# Patient Record
Sex: FEMALE | Race: ASIAN | HISPANIC OR LATINO | Employment: OTHER
[De-identification: names, ages, dates, MRNs, and addresses within clinical notes are randomized per-mention and may not be internally consistent; named-entity substitution may affect disease eponyms.]

---

## 2019-09-10 ENCOUNTER — RECORDS - HEALTHEAST (OUTPATIENT)
Dept: ADMINISTRATIVE | Facility: OTHER | Age: 34
End: 2019-09-10

## 2019-11-06 ENCOUNTER — OFFICE VISIT - HEALTHEAST (OUTPATIENT)
Dept: FAMILY MEDICINE | Facility: CLINIC | Age: 34
End: 2019-11-06

## 2019-11-06 DIAGNOSIS — A04.8 H. PYLORI INFECTION: ICD-10-CM

## 2019-11-06 DIAGNOSIS — R09.82 POSTNASAL DISCHARGE: ICD-10-CM

## 2019-11-06 DIAGNOSIS — Z76.89 ENCOUNTER TO ESTABLISH CARE: ICD-10-CM

## 2019-11-06 ASSESSMENT — MIFFLIN-ST. JEOR: SCORE: 1104.33

## 2019-12-20 ENCOUNTER — OFFICE VISIT - HEALTHEAST (OUTPATIENT)
Dept: FAMILY MEDICINE | Facility: CLINIC | Age: 34
End: 2019-12-20

## 2019-12-20 ENCOUNTER — COMMUNICATION - HEALTHEAST (OUTPATIENT)
Dept: FAMILY MEDICINE | Facility: CLINIC | Age: 34
End: 2019-12-20

## 2020-01-02 ENCOUNTER — OFFICE VISIT - HEALTHEAST (OUTPATIENT)
Dept: FAMILY MEDICINE | Facility: CLINIC | Age: 35
End: 2020-01-02

## 2020-01-02 DIAGNOSIS — R05.9 COUGH: ICD-10-CM

## 2020-01-02 DIAGNOSIS — K29.00 OTHER ACUTE GASTRITIS, PRESENCE OF BLEEDING UNSPECIFIED: ICD-10-CM

## 2020-01-02 ASSESSMENT — MIFFLIN-ST. JEOR: SCORE: 1119.48

## 2020-01-06 LAB — H PYLORI AG STL QL IA: NEGATIVE

## 2020-01-08 ENCOUNTER — COMMUNICATION - HEALTHEAST (OUTPATIENT)
Dept: FAMILY MEDICINE | Facility: CLINIC | Age: 35
End: 2020-01-08

## 2020-01-08 DIAGNOSIS — K29.00 OTHER ACUTE GASTRITIS, PRESENCE OF BLEEDING UNSPECIFIED: ICD-10-CM

## 2020-01-17 ENCOUNTER — COMMUNICATION - HEALTHEAST (OUTPATIENT)
Dept: SCHEDULING | Facility: CLINIC | Age: 35
End: 2020-01-17

## 2020-01-17 ENCOUNTER — COMMUNICATION - HEALTHEAST (OUTPATIENT)
Dept: FAMILY MEDICINE | Facility: CLINIC | Age: 35
End: 2020-01-17

## 2020-01-20 ENCOUNTER — OFFICE VISIT - HEALTHEAST (OUTPATIENT)
Dept: FAMILY MEDICINE | Facility: CLINIC | Age: 35
End: 2020-01-20

## 2020-01-30 ENCOUNTER — OFFICE VISIT - HEALTHEAST (OUTPATIENT)
Dept: FAMILY MEDICINE | Facility: CLINIC | Age: 35
End: 2020-01-30

## 2020-01-30 DIAGNOSIS — K29.00 OTHER ACUTE GASTRITIS, PRESENCE OF BLEEDING UNSPECIFIED: ICD-10-CM

## 2020-01-30 DIAGNOSIS — R05.9 COUGH: ICD-10-CM

## 2020-01-30 ASSESSMENT — MIFFLIN-ST. JEOR: SCORE: 1109.33

## 2020-02-27 ENCOUNTER — OFFICE VISIT - HEALTHEAST (OUTPATIENT)
Dept: FAMILY MEDICINE | Facility: CLINIC | Age: 35
End: 2020-02-27

## 2020-02-27 DIAGNOSIS — R05.9 COUGH: ICD-10-CM

## 2020-02-27 DIAGNOSIS — K29.00 OTHER ACUTE GASTRITIS, PRESENCE OF BLEEDING UNSPECIFIED: ICD-10-CM

## 2020-02-27 DIAGNOSIS — Z32.01 PREGNANCY TEST POSITIVE: ICD-10-CM

## 2020-02-27 DIAGNOSIS — R30.0 DYSURIA: ICD-10-CM

## 2020-02-27 DIAGNOSIS — N92.6 MISSED PERIOD: ICD-10-CM

## 2020-02-27 LAB
ALBUMIN UR-MCNC: NEGATIVE MG/DL
APPEARANCE UR: CLEAR
BILIRUB UR QL STRIP: NEGATIVE
COLOR UR AUTO: NORMAL
GLUCOSE UR STRIP-MCNC: NEGATIVE MG/DL
HCG UR QL: POSITIVE
HGB UR QL STRIP: NEGATIVE
KETONES UR STRIP-MCNC: NEGATIVE MG/DL
LEUKOCYTE ESTERASE UR QL STRIP: NEGATIVE
NITRATE UR QL: NEGATIVE
PH UR STRIP: 7.5 [PH] (ref 5–8)
SP GR UR STRIP: 1.01 (ref 1–1.03)
UROBILINOGEN UR STRIP-ACNC: NORMAL

## 2020-02-27 RX ORDER — PRENATAL VIT/IRON FUM/FOLIC AC 27MG-0.8MG
1 TABLET ORAL DAILY
Qty: 90 TABLET | Refills: 3 | Status: SHIPPED | OUTPATIENT
Start: 2020-02-27 | End: 2021-08-12

## 2020-02-27 ASSESSMENT — MIFFLIN-ST. JEOR: SCORE: 1094.57

## 2020-03-09 ENCOUNTER — COMMUNICATION - HEALTHEAST (OUTPATIENT)
Dept: FAMILY MEDICINE | Facility: CLINIC | Age: 35
End: 2020-03-09

## 2020-03-09 ENCOUNTER — OFFICE VISIT - HEALTHEAST (OUTPATIENT)
Dept: FAMILY MEDICINE | Facility: CLINIC | Age: 35
End: 2020-03-09

## 2020-03-09 ENCOUNTER — COMMUNICATION - HEALTHEAST (OUTPATIENT)
Dept: SCHEDULING | Facility: CLINIC | Age: 35
End: 2020-03-09

## 2020-03-09 DIAGNOSIS — O03.9 MISCARRIAGE: ICD-10-CM

## 2020-03-09 DIAGNOSIS — G44.209 TENSION HEADACHE: ICD-10-CM

## 2020-03-09 DIAGNOSIS — Z23 IMMUNIZATION DUE: ICD-10-CM

## 2020-03-09 LAB — HCG SERPL-ACNC: 877 MLU/ML (ref 0–4)

## 2020-03-09 ASSESSMENT — MIFFLIN-ST. JEOR: SCORE: 1113.68

## 2020-03-10 ENCOUNTER — AMBULATORY - HEALTHEAST (OUTPATIENT)
Dept: FAMILY MEDICINE | Facility: CLINIC | Age: 35
End: 2020-03-10

## 2020-03-10 DIAGNOSIS — O03.9 MISCARRIAGE: ICD-10-CM

## 2020-03-16 ENCOUNTER — AMBULATORY - HEALTHEAST (OUTPATIENT)
Dept: LAB | Facility: CLINIC | Age: 35
End: 2020-03-16

## 2020-03-16 DIAGNOSIS — O03.9 MISCARRIAGE: ICD-10-CM

## 2020-03-16 LAB — HCG SERPL-ACNC: 49 MLU/ML (ref 0–4)

## 2020-03-17 ENCOUNTER — AMBULATORY - HEALTHEAST (OUTPATIENT)
Dept: LAB | Facility: CLINIC | Age: 35
End: 2020-03-17

## 2020-03-17 ENCOUNTER — AMBULATORY - HEALTHEAST (OUTPATIENT)
Dept: FAMILY MEDICINE | Facility: CLINIC | Age: 35
End: 2020-03-17

## 2020-03-17 DIAGNOSIS — O03.9 MISCARRIAGE: ICD-10-CM

## 2020-03-17 LAB — HCG SERPL-ACNC: 32 MLU/ML (ref 0–4)

## 2020-08-06 ENCOUNTER — OFFICE VISIT - HEALTHEAST (OUTPATIENT)
Dept: FAMILY MEDICINE | Facility: CLINIC | Age: 35
End: 2020-08-06

## 2020-08-06 DIAGNOSIS — R09.82 POSTNASAL DISCHARGE: ICD-10-CM

## 2020-08-06 DIAGNOSIS — K29.00 OTHER ACUTE GASTRITIS, PRESENCE OF BLEEDING UNSPECIFIED: ICD-10-CM

## 2020-08-06 DIAGNOSIS — R05.9 COUGH: ICD-10-CM

## 2020-08-06 RX ORDER — CETIRIZINE HYDROCHLORIDE 10 MG/1
10 TABLET ORAL DAILY
Qty: 30 TABLET | Refills: 2 | Status: SHIPPED | OUTPATIENT
Start: 2020-08-06 | End: 2021-08-12

## 2020-08-19 ENCOUNTER — OFFICE VISIT - HEALTHEAST (OUTPATIENT)
Dept: FAMILY MEDICINE | Facility: CLINIC | Age: 35
End: 2020-08-19

## 2020-08-19 DIAGNOSIS — Z28.39 IMMUNIZATION DEFICIENCY: ICD-10-CM

## 2020-08-19 ASSESSMENT — MIFFLIN-ST. JEOR: SCORE: 1134.95

## 2020-09-08 ENCOUNTER — COMMUNICATION - HEALTHEAST (OUTPATIENT)
Dept: FAMILY MEDICINE | Facility: CLINIC | Age: 35
End: 2020-09-08

## 2020-09-11 ENCOUNTER — OFFICE VISIT - HEALTHEAST (OUTPATIENT)
Dept: FAMILY MEDICINE | Facility: CLINIC | Age: 35
End: 2020-09-11

## 2020-09-11 ENCOUNTER — COMMUNICATION - HEALTHEAST (OUTPATIENT)
Dept: FAMILY MEDICINE | Facility: CLINIC | Age: 35
End: 2020-09-11

## 2020-09-11 DIAGNOSIS — Z23 NEED FOR IMMUNIZATION AGAINST INFLUENZA: ICD-10-CM

## 2020-09-11 DIAGNOSIS — K21.9 GASTROESOPHAGEAL REFLUX DISEASE WITHOUT ESOPHAGITIS: ICD-10-CM

## 2020-09-11 DIAGNOSIS — R30.0 DYSURIA: ICD-10-CM

## 2020-09-11 LAB
ALBUMIN UR-MCNC: NEGATIVE MG/DL
APPEARANCE UR: CLEAR
BILIRUB UR QL STRIP: NEGATIVE
COLOR UR AUTO: YELLOW
GLUCOSE UR STRIP-MCNC: NEGATIVE MG/DL
HGB UR QL STRIP: NEGATIVE
KETONES UR STRIP-MCNC: NEGATIVE MG/DL
LEUKOCYTE ESTERASE UR QL STRIP: NEGATIVE
NITRATE UR QL: NEGATIVE
PH UR STRIP: 7 [PH] (ref 5–8)
SP GR UR STRIP: 1.02 (ref 1–1.03)
UROBILINOGEN UR STRIP-ACNC: NORMAL

## 2020-09-11 ASSESSMENT — MIFFLIN-ST. JEOR: SCORE: 1113.4

## 2020-10-11 ENCOUNTER — COMMUNICATION - HEALTHEAST (OUTPATIENT)
Dept: FAMILY MEDICINE | Facility: CLINIC | Age: 35
End: 2020-10-11

## 2020-10-11 DIAGNOSIS — K29.00 OTHER ACUTE GASTRITIS, PRESENCE OF BLEEDING UNSPECIFIED: ICD-10-CM

## 2020-10-13 RX ORDER — FAMOTIDINE 20 MG/1
TABLET, FILM COATED ORAL
Qty: 180 TABLET | Refills: 3 | Status: SHIPPED | OUTPATIENT
Start: 2020-10-13 | End: 2021-08-12

## 2021-03-31 ENCOUNTER — OFFICE VISIT - HEALTHEAST (OUTPATIENT)
Dept: FAMILY MEDICINE | Facility: CLINIC | Age: 36
End: 2021-03-31

## 2021-03-31 DIAGNOSIS — R05.9 COUGH: ICD-10-CM

## 2021-03-31 DIAGNOSIS — R30.0 DYSURIA: ICD-10-CM

## 2021-03-31 DIAGNOSIS — K21.9 GASTROESOPHAGEAL REFLUX DISEASE WITHOUT ESOPHAGITIS: ICD-10-CM

## 2021-03-31 LAB
ALBUMIN UR-MCNC: NEGATIVE G/DL
APPEARANCE UR: CLEAR
BILIRUB UR QL STRIP: NEGATIVE
COLOR UR AUTO: YELLOW
GLUCOSE UR STRIP-MCNC: NEGATIVE MG/DL
HGB UR QL STRIP: NEGATIVE
KETONES UR STRIP-MCNC: NEGATIVE MG/DL
LEUKOCYTE ESTERASE UR QL STRIP: NEGATIVE
NITRATE UR QL: NEGATIVE
PH UR STRIP: 5.5 [PH] (ref 5–8)
SP GR UR STRIP: 1.02 (ref 1–1.03)
UROBILINOGEN UR STRIP-ACNC: NORMAL

## 2021-03-31 RX ORDER — MONTELUKAST SODIUM 10 MG/1
10 TABLET ORAL DAILY
Qty: 30 TABLET | Refills: 2 | Status: SHIPPED | OUTPATIENT
Start: 2021-03-31 | End: 2021-08-12

## 2021-03-31 ASSESSMENT — MIFFLIN-ST. JEOR: SCORE: 1173.81

## 2021-04-28 ENCOUNTER — OFFICE VISIT - HEALTHEAST (OUTPATIENT)
Dept: FAMILY MEDICINE | Facility: CLINIC | Age: 36
End: 2021-04-28

## 2021-04-28 ENCOUNTER — COMMUNICATION - HEALTHEAST (OUTPATIENT)
Dept: ADMINISTRATIVE | Facility: CLINIC | Age: 36
End: 2021-04-28

## 2021-04-28 DIAGNOSIS — H91.93 BILATERAL HEARING LOSS, UNSPECIFIED HEARING LOSS TYPE: ICD-10-CM

## 2021-04-28 DIAGNOSIS — H61.119: ICD-10-CM

## 2021-04-28 DIAGNOSIS — G44.209 TENSION-TYPE HEADACHE, NOT INTRACTABLE, UNSPECIFIED CHRONICITY PATTERN: ICD-10-CM

## 2021-04-28 DIAGNOSIS — H61.111 EAR CARTILAGE DEFORMITY, RIGHT: ICD-10-CM

## 2021-04-28 DIAGNOSIS — R10.2 PELVIC PAIN IN FEMALE: ICD-10-CM

## 2021-04-28 LAB
ALBUMIN UR-MCNC: NEGATIVE G/DL
APPEARANCE UR: CLEAR
BILIRUB UR QL STRIP: NEGATIVE
COLOR UR AUTO: YELLOW
GLUCOSE UR STRIP-MCNC: NEGATIVE MG/DL
HCG UR QL: NEGATIVE
HGB UR QL STRIP: NEGATIVE
KETONES UR STRIP-MCNC: NEGATIVE MG/DL
LEUKOCYTE ESTERASE UR QL STRIP: NEGATIVE
NITRATE UR QL: NEGATIVE
PH UR STRIP: 5.5 [PH] (ref 5–8)
SP GR UR STRIP: 1.01 (ref 1–1.03)
UROBILINOGEN UR STRIP-ACNC: NORMAL

## 2021-04-28 RX ORDER — IBUPROFEN 200 MG
400 TABLET ORAL EVERY 6 HOURS PRN
Qty: 60 TABLET | Refills: 12 | Status: SHIPPED | OUTPATIENT
Start: 2021-04-28 | End: 2021-08-12

## 2021-04-28 ASSESSMENT — MIFFLIN-ST. JEOR: SCORE: 1172.16

## 2021-05-27 ENCOUNTER — COMMUNICATION - HEALTHEAST (OUTPATIENT)
Dept: FAMILY MEDICINE | Facility: CLINIC | Age: 36
End: 2021-05-27

## 2021-06-03 VITALS
BODY MASS INDEX: 23.01 KG/M2 | RESPIRATION RATE: 18 BRPM | SYSTOLIC BLOOD PRESSURE: 112 MMHG | HEART RATE: 97 BPM | HEIGHT: 59 IN | TEMPERATURE: 97.6 F | DIASTOLIC BLOOD PRESSURE: 82 MMHG | OXYGEN SATURATION: 99 % | WEIGHT: 114.13 LBS

## 2021-06-03 NOTE — PROGRESS NOTES
ASSESSMENT AND PLAN:  1. Encounter to establish care  Patient lives in [facility] before moving to [place].  There she was seen at a clinic and I requested records.  She is apparently treated for H. pylori there.  She was also treated for a cough and possible allergies.  She does not have any of those records    2. H. pylori infection    We will bottles of clarithromycin and amoxicillin brought in today patient still not eating spicy food denies any abdominal burning at this time    3. Postnasal discharge    Patient's had significant cough for a week had previous symptoms before [place] respiratory exams unremarkable cetirizine started follow-up in 4 to 6 weeks  - cetirizine (ZYRTEC) 10 MG tablet; Take 1 tablet (10 mg total) by mouth daily.  Dispense: 30 tablet; Refill: 2            No orders of the defined types were placed in this encounter.    There are no discontinued medications.    No follow-ups on file.    CHIEF COMPLAINT:  Chief Complaint   Patient presents with     Establish Care     medication refill       HISTORY OF PRESENT ILLNESS:  Rafal is a 34 y.o. female who presents to the clinic today for establishment of care and a cough. Rafal is present with a Zainab . Today rafal is present with ah cough that has been constant for two weeks. The cough keeps her up at night as she has to change positions for comfort.  Say confirms no one else in the home is sick .     Rafal recently moved from [place], [place]. There she was treated by a Dr. Roselia Hernandez for H.Pylori. She was prescribed amoxicillin, and completed the course. Since Say confirms she has been able to eat spicy foods but is still careful. Denies stomach irritation at this time .    REVIEW OF SYSTEMS:   Admits to scratchy throat, rhinorrhea, head ache and coughing.   Denies snoring, abdominal pain, and stomach irritation.   All other 12 point review systems are negative.    PFSH:  Daren moved from [place], [place] in September. She has  Not lives anywhere  "else in the [place]    TOBACCO USE:  Social History     Tobacco Use   Smoking Status Never Smoker   Smokeless Tobacco Current User   Tobacco Comment    Chews betel nut with tobacco       VITALS:  Vitals:    11/06/19 1555   BP: 112/82   Pulse: 97   Resp: 18   Temp: 97.6  F (36.4  C)   TempSrc: Oral   SpO2: 99%   Weight: 114 lb 2 oz (51.8 kg)   Height: 4' 10.75\" (1.492 m)     Wt Readings from Last 3 Encounters:   11/06/19 114 lb 2 oz (51.8 kg)     Body mass index is 23.25 kg/m .    PHYSICAL EXAM:  General: Alert, cooperative, no distress, appears stated age  Head: Normocephalic, without obvious abnormality, atraumatic  Eyes: PERRL, conjunctiva/cornea clear, EOM's intact, fundi benign, both eyes  Ears: Normal TM's and external ear canals, both ears. Microtia right Morgan  Nose: Nares normal, septum midline, mucosa normal, no drainage or sinus tenderness  Throat: Lips, mucosa, and tongue normal; teeth and gums normal. Tonsils removed.   Lungs: Clear to auscultation bilaterally, respirations unlabored  Chest wall: No tenderness or deformity  Heart: Regular rate and rhythm, S1 and S2 normal, no murmur, rub, or gallop  Abdomen: Soft, non tender, bowel sounds active all four quadrants, no masses, no organomegaly.  Neurologic:  A & O x 3.  No tremor, no focal findings.      DATA REVIEWED:  Additional History from Old Records Summarized (2): None  Decision to Obtain Records (1): Release of information obtained for clinic in [facility]  Radiology Tests Summarized or Ordered (1): None  Labs Reviewed or Ordered (1): None  Medicine Test Summarized or Ordered (1): None  Independent Review of EKG or X-RAY(2 each): None    The visit lasted a total of 30 minutes face to face with the patient. Over 50% of the time was spent counseling and educating the patient about cough.  H. pylori.    Mirna JACOBO, am scribing for and in the presence of, Dr. Owens.    IDr. Owens, personally performed the services described in this " documentation, as scribed by Mirna Zuniga in my presence, and it is both accurate and complete.      MEDICATIONS:  Current Outpatient Medications   Medication Sig Dispense Refill     cetirizine (ZYRTEC) 10 MG tablet Take 1 tablet (10 mg total) by mouth daily. 30 tablet 2     No current facility-administered medications for this visit.        Total Data Points: 0    Please note that this clinical encounter uses voice recognition software, there may be typographical errors present

## 2021-06-04 VITALS
TEMPERATURE: 97.5 F | OXYGEN SATURATION: 96 % | HEART RATE: 111 BPM | BODY MASS INDEX: 23.07 KG/M2 | RESPIRATION RATE: 20 BRPM | DIASTOLIC BLOOD PRESSURE: 70 MMHG | WEIGHT: 114.44 LBS | SYSTOLIC BLOOD PRESSURE: 110 MMHG | HEIGHT: 59 IN

## 2021-06-04 VITALS
TEMPERATURE: 97.9 F | OXYGEN SATURATION: 98 % | HEIGHT: 59 IN | RESPIRATION RATE: 18 BRPM | SYSTOLIC BLOOD PRESSURE: 100 MMHG | BODY MASS INDEX: 24.19 KG/M2 | WEIGHT: 120 LBS | DIASTOLIC BLOOD PRESSURE: 64 MMHG | HEART RATE: 86 BPM

## 2021-06-04 VITALS
HEART RATE: 111 BPM | SYSTOLIC BLOOD PRESSURE: 102 MMHG | WEIGHT: 117.5 LBS | DIASTOLIC BLOOD PRESSURE: 54 MMHG | BODY MASS INDEX: 23.69 KG/M2 | OXYGEN SATURATION: 98 % | HEIGHT: 59 IN | TEMPERATURE: 97.7 F | RESPIRATION RATE: 22 BRPM

## 2021-06-04 VITALS
DIASTOLIC BLOOD PRESSURE: 68 MMHG | OXYGEN SATURATION: 99 % | HEIGHT: 58 IN | BODY MASS INDEX: 24.05 KG/M2 | HEART RATE: 89 BPM | RESPIRATION RATE: 18 BRPM | WEIGHT: 114.6 LBS | TEMPERATURE: 98.2 F | SYSTOLIC BLOOD PRESSURE: 99 MMHG

## 2021-06-04 VITALS
HEIGHT: 59 IN | HEART RATE: 102 BPM | SYSTOLIC BLOOD PRESSURE: 102 MMHG | TEMPERATURE: 97.9 F | DIASTOLIC BLOOD PRESSURE: 60 MMHG | BODY MASS INDEX: 23.48 KG/M2 | OXYGEN SATURATION: 97 % | RESPIRATION RATE: 16 BRPM | WEIGHT: 116.5 LBS

## 2021-06-04 NOTE — PROGRESS NOTES
ASSESSMENT AND PLAN:  1. Cough  She had no response to cetirizine I am stopping the medic Singulair she has a dry cough that is always present  - montelukast (SINGULAIR) 10 mg tablet; Take 1 tablet (10 mg total) by mouth daily.  Dispense: 30 tablet; Refill: 2    2. Other acute gastritis, presence of bleeding unspecified  H. pylori test positive before [place] she is having burning in the abdominal area she can eat spicy food I am repeating the H. pylori test will start on ranitidine  - H. pylori Antigen, Stool(HPSAG)  - ranitidine (ZANTAC) 150 MG tablet; Take 1 tablet (150 mg total) by mouth 2 (two) times a day.  Dispense: 60 tablet; Refill: 1          Orders Placed This Encounter   Procedures     H. pylori Antigen, Stool(HPSAG)     There are no discontinued medications.    Return in about 4 weeks (around 1/30/2020) for Recheck.    CHIEF COMPLAINT:  Chief Complaint   Patient presents with     Allergies       HISTORY OF PRESENT ILLNESS:  Oli is a 35 y.o. female with history of H. pylori infection who presents to the clinic today for follow up on allergies. Oli is present with a Zainab . I had started her on cetirizine back in November 2019. She is having a dry cough at this time. Her cough is worse at night. It only improve minimally while on cetirizine. The patient is now off of cetirizine though is unsure when exactly she ran out. She did chew betel nut today.     She notes recent onset of burning abdominal pain.     REVIEW OF SYSTEMS:   Respiratory: Dry cough.   GI: Burning abdominal pain.   All other 10 point review of systems are negative.    PFSH:  Patient moved from [place], [place] in September 2019. She chews betel nut. Reviewed as below.     TOBACCO USE:  Social History     Tobacco Use   Smoking Status Never Smoker   Smokeless Tobacco Current User   Tobacco Comment    Chews betel nut with tobacco       VITALS:  Vitals:    01/02/20 1007   BP: 102/54   Patient Site: Right Arm   Patient Position: Sitting  "  Cuff Size: Adult Regular   Pulse: (!) 111   Resp: 22   Temp: 97.7  F (36.5  C)   TempSrc: Oral   SpO2: 98%   Weight: 117 lb 8 oz (53.3 kg)   Height: 4' 10.74\" (1.492 m)     Wt Readings from Last 3 Encounters:   01/02/20 117 lb 8 oz (53.3 kg)   11/06/19 114 lb 2 oz (51.8 kg)     Body mass index is 23.94 kg/m .    PHYSICAL EXAM:  General: Alert, cooperative, no distress, appears stated age  Head: Normocephalic, without obvious abnormality, atraumatic  Eyes: PERRL, conjunctiva/cornea clear, EOM's intact  Ears: Normal TM's and external ear canals, both ears  Nose: Nares normal, septum midline, mucosa normal, no drainage or sinus tenderness  Throat: Stained with betel nuts; lips, mucosa, and tongue otherwise normal; teeth and gums otherwise normal; posterior of pharyngeal wall is nonerythematous   Neck: Supple, no cervical lymph node enlargement   Lungs: Clear to auscultation bilaterally, respirations unlabored  Heart: Regular rate and rhythm, S1 and S2 normal, no murmur, rub, or gallop  Abdomen: Soft, non tender, bowel sounds active all four quadrants, no masses, no organomegaly.  Neurologic:  A & O x 3.  No tremor, no focal findings.  Normal gait.   Psychiatric: Normal affect, good eye contact, well-groomed  Skin: No rash or suspicious lesions noted on exposed skin, non-diaphoretic    DATA REVIEWED:  Additional History from Old Records Summarized (2): none  Decision to Obtain Records (1): none  Radiology Tests Summarized or Ordered (1): none  Labs Reviewed or Ordered (1): Labs ordered.  Medicine Test Summarized or Ordered (1): none  Independent Review of EKG or X-RAY(2 each): none    I, Cristina Su, am scribing for and in the presence of, Dr. Owens.    I, Dr. Owens, personally performed the services described in this documentation, as scribed by Crisitna Su in my presence, and it is both accurate and complete.    Total amount time spent the patient today 25 minutes given 50% of the time was spent discussing her " gastritis and cough  MEDICATIONS:  Current Outpatient Medications   Medication Sig Dispense Refill     cetirizine (ZYRTEC) 10 MG tablet Take 1 tablet (10 mg total) by mouth daily. 30 tablet 2     montelukast (SINGULAIR) 10 mg tablet Take 1 tablet (10 mg total) by mouth daily. 30 tablet 2     ranitidine (ZANTAC) 150 MG tablet Take 1 tablet (150 mg total) by mouth 2 (two) times a day. 60 tablet 1     No current facility-administered medications for this visit.        Total Data Points: 1    Please note that this clinical encounter uses voice recognition software, there may be typographical errors present

## 2021-06-04 NOTE — PATIENT INSTRUCTIONS - HE
Cough:     Start montelukast (SINGULAIR) 10 mg tablet; Take 1 tablet (10 mg total) by mouth daily.     Gastritis:     H. pylori screen ordered.    If positive, prescriptions will be sent for treatment course.    In the meantime, start ranitidine (ZANTAC) 150 MG tablet; Take 1 tablet (150 mg total) by mouth 2 (two) times a day.    Follow up:     Return to see Dr. Owens in 4 weeks.

## 2021-06-04 NOTE — TELEPHONE ENCOUNTER
Upcoming Appointment Question  When is the appointment: 01/02/20  What is your appointment for?: allergies - this is a reschedule of a missed 12/20/19 appointment  Who is your appointment scheduled with?: Dr. Owens  What is your question/concern?: patient's sister called to reschedule missed appointment on 12/20/19.  Sister states the patient did not have transportation today.  Once sister rescheduled the appointment she then requested transportation from the clinic for the appointment.    Okay to leave a detailed message?: Yes please contact patient at [phone]

## 2021-06-05 VITALS
HEART RATE: 90 BPM | WEIGHT: 117 LBS | TEMPERATURE: 97.7 F | OXYGEN SATURATION: 98 % | DIASTOLIC BLOOD PRESSURE: 60 MMHG | BODY MASS INDEX: 23.59 KG/M2 | HEIGHT: 59 IN | SYSTOLIC BLOOD PRESSURE: 92 MMHG

## 2021-06-05 VITALS
HEIGHT: 59 IN | RESPIRATION RATE: 14 BRPM | TEMPERATURE: 98.7 F | OXYGEN SATURATION: 98 % | HEART RATE: 95 BPM | WEIGHT: 128.1 LBS | BODY MASS INDEX: 25.83 KG/M2 | SYSTOLIC BLOOD PRESSURE: 100 MMHG | DIASTOLIC BLOOD PRESSURE: 70 MMHG

## 2021-06-05 VITALS
RESPIRATION RATE: 16 BRPM | HEART RATE: 91 BPM | OXYGEN SATURATION: 99 % | SYSTOLIC BLOOD PRESSURE: 100 MMHG | DIASTOLIC BLOOD PRESSURE: 64 MMHG | TEMPERATURE: 97.5 F | WEIGHT: 127.75 LBS | HEIGHT: 59 IN | BODY MASS INDEX: 25.76 KG/M2

## 2021-06-05 NOTE — PROGRESS NOTES
ASSESSMENT AND PLAN:  1. Cough  Cough is improved no shortness of breath no chest tightness continue Singulair no side effects noted recently.  She did not bring the medication in for review.  - montelukast (SINGULAIR) 10 mg tablet; Take 1 tablet (10 mg total) by mouth daily.  Dispense: 30 tablet; Refill: 2    2. Other acute gastritis, presence of bleeding unspecified  Took ranitidine was given prescription omeprazole was on stat denies any stomach pain still can eat spicy food H. pylori test was negative abdominal exam was unremarkable today follow-up 6 weeks to see if symptoms are still present.  - omeprazole (PRILOSEC) 20 MG capsule; Take 1 capsule (20 mg total) by mouth daily.  Dispense: 30 capsule; Refill: 11            No orders of the defined types were placed in this encounter.    Medications Discontinued During This Encounter   Medication Reason     montelukast (SINGULAIR) 10 mg tablet Reorder     omeprazole (PRILOSEC) 20 MG capsule Reorder       Return in about 4 weeks (around 2/27/2020) for Recheck.    CHIEF COMPLAINT:  Chief Complaint   Patient presents with     Allergies       HISTORY OF PRESENT ILLNESS:  Oli is a 35 y.o. female who presents to the clinic today for follow up on allergies. Oli is present with a Zainab . At her last visit with me three weeks ago, she had been started on Singulair which she took twice a day so if has run out at this time. Her cough reportedly has improved. The patient then missed her follow up appointment with Dr. Brantley that was scheduled 10 days ago.     She is having difficulty sleeping.     Oli reports that the pharmacy gave her a different medication from the ranitidine that was ordered at her last visit. She is not sure she likes the alternative medication because it did not make her feel well. On review, ranitidine was switched to omeprazole. The patient is unable to tolerate spicy food due to burning abdominal pain. She only took the omeprazole once or  "twice. Say denies any significant stress or anxiety. Of note, her H. pylori test done at that visit returned negative.     She would like green card screening done.     The patient is struggling with some constipation and has noticed some blood in her stools. [place] and has improved since. Say is unsure how much water she drinks during the day. She denies any abnormal vaginal bleeding.    REVIEW OF SYSTEMS:   Respiratory: No cough.  GI: Burning abdominal pain, constipation, and blood in stools.  : No abnormal vaginal bleeding.  Neuro: Insomnia.    Psychiatric: No anxiety or stressors.  All other systems are negative.    PFSH:  She is accompanied by her sister. Reviewed as below.     TOBACCO USE:  Social History     Tobacco Use   Smoking Status Never Smoker   Smokeless Tobacco Current User   Tobacco Comment    Chews betel nut with tobacco       VITALS:  Vitals:    01/30/20 1033   BP: 110/70   Patient Site: Right Arm   Patient Position: Sitting   Cuff Size: Adult Small   Pulse: (!) 111   Resp: 20   Temp: 97.5  F (36.4  C)   TempSrc: Oral   SpO2: 96%   Weight: 114 lb 7 oz (51.9 kg)   Height: 4' 10.98\" (1.498 m)     Wt Readings from Last 3 Encounters:   01/30/20 114 lb 7 oz (51.9 kg)   01/02/20 117 lb 8 oz (53.3 kg)   11/06/19 114 lb 2 oz (51.8 kg)     Body mass index is 23.13 kg/m .    PHYSICAL EXAM:  General: Alert, cooperative, no distress, appears stated age  Head: Normocephalic, without obvious abnormality, atraumatic, moist mucous membranes  Eyes: PERRL, conjunctiva/cornea clear, EOM's intact  Neck: Supple, no cervical lymph node enlargement   Lungs: Clear to auscultation bilaterally, respirations unlabored  Heart: Regular rate and rhythm, S1 and S2 normal, no murmur, rub, or gallop  Abdomen: Soft, non tender, bowel sounds active all four quadrants, no masses, no organomegaly.  Neurologic:  A & O x 3.  No tremor, no focal findings.  Normal gait.   Psychiatric: Normal affect, " good eye contact, well-groomed  Skin: No rash or suspicious lesions noted on exposed skin, non-diaphoretic    DATA REVIEWED:  Additional History from Old Records Summarized (2): none  Decision to Obtain Records (1): none  Radiology Tests Summarized or Ordered (1): none  Labs Reviewed or Ordered (1): 01/03/2020 H. pylori screen negative.  Medicine Test Summarized or Ordered (1): none  Independent Review of EKG or X-RAY(2 each): none    Total time was 16 minutes, greater than 50% counseling and coordinating care regarding the above issues.    ICristina, am scribing for and in the presence of, Dr. Owens.    IDr. Owens, personally performed the services described in this documentation, as scribed by Cristina Su in my presence, and it is both accurate and complete.      MEDICATIONS:  Current Outpatient Medications   Medication Sig Dispense Refill     cetirizine (ZYRTEC) 10 MG tablet Take 1 tablet (10 mg total) by mouth daily. 30 tablet 2     montelukast (SINGULAIR) 10 mg tablet Take 1 tablet (10 mg total) by mouth daily. 30 tablet 2     omeprazole (PRILOSEC) 20 MG capsule Take 1 capsule (20 mg total) by mouth daily. 30 capsule 11     ranitidine (ZANTAC) 150 MG tablet Take 1 tablet (150 mg total) by mouth 2 (two) times a day. 60 tablet 1     No current facility-administered medications for this visit.    Total amount time spent today's visit was 25 minutes greater than 50% of time was spent discussing the pathophysiology of asthma with the patient    Total Data Points: 1    Please note that this clinical encounter uses voice recognition software, there may be typographical errors present

## 2021-06-05 NOTE — TELEPHONE ENCOUNTER
Drug Change Request  Who is calling?:  Pharmacy  What is the current medication?:    ranitidine (ZANTAC) 150 MG tablet 60 tablet 1 1/2/2020  No   Sig - Route: Take 1 tablet (150 mg total) by mouth 2 (two) times a day. - Oral     What alternative is being requested?: no specific alternative  Why the request to change?:  This medication has been recalled  Requested [facility]acy?: [facility]  Okay to leave a detailed message?:  Yes

## 2021-06-05 NOTE — TELEPHONE ENCOUNTER
"RN Assessment/Reason for Call:   Okay to leave Detailed Message   # [id number] Irvin calling in,  Appt \"no ride\" until Monday; can get a  ride.  Swelling in her chin, ?abcess  No fever  Cough and stomach med's  Itching; lump is growing.     RN Action/Disposition:  Protocol recommends see Dr in 4 hrs.  Offered [place].She will try to find a ride.(storming today)  Call back if worse symptoms  Discussed home care measures.  Agrees to plan.     Rose Al RN    [facility] Triage/med refill  1/17/2020  3:23 PM        Reason for Disposition    SEVERE pain (e.g., excruciating)    Protocols used: BOIL (SKIN ABSCESS)-A-AH      "

## 2021-06-05 NOTE — TELEPHONE ENCOUNTER
The problem is that pt was seen in clinic by Dr. Owens who is not back now till Tuesday.  No mention of any bump.  Also most transportation takes 3 day notice.  Pt will need to go to [facility] or find her own ride for Monday's apt.  Thanks.

## 2021-06-05 NOTE — TELEPHONE ENCOUNTER
Who is calling:  Patient   Reason for Call:  Patient will need transportation to appointment on 1/20/20  Date of last appointment with primary care: 1/2/20  Okay to leave a detailed message: Yes

## 2021-06-05 NOTE — PATIENT INSTRUCTIONS - HE
Cough:     Resume Singulair 10 mg 1 time a day.    This was refilled.    Gastritis:    Resume omeprazole 20 mg 1 time a day.     Refill was given.    Constipation:     Recommend at least 64 oz of water per day.     We will reevaluate at next visit.    Follow up:     Return to see Dr. Owens in 4 weeks.     Please bring your medications to your next visit.

## 2021-06-05 NOTE — TELEPHONE ENCOUNTER
Pt called triage for this issue.  Pt was told to go to [facility] and find own ride.  Pt agrees to plan. Thanks.

## 2021-06-06 NOTE — TELEPHONE ENCOUNTER
[facility] triage call :  PCP = Dominic Owens MD  Presenting problem : Zainab  called with Pt .  Used 2nd interpretor   Zainab # [id number]   with Pt  . Seen in [facility]  Ed for 3/6/20  Miscarriage and having  mild  dizziness and still having constant  Low back and lower abdominal  Pain constant and @  5- 6/10 on pain scale  Now , and no fever , 1&0 is good  , eating is poor and activity is  ADL's .    Has  appt  3/11/20 for after ED visit and  per discharge instructions  if  Vaginal bleeding too severe return to ED .   Guideline used :  , threatened miscarriage follow up call A  - [facility] guideline found .   Disposition and recommendations : Return to ED but Pt refuses just wants appt only and Conferenced  to  with   No appt=  [facility] or Gerry moy .   3rd  # [id number] putted up because phone system dropped last  .   Please call back Pt with Zainab  , would like appt moved up to today but could see  another provider or PCP ,  instead Wed 3/11/20 for follow up Ed visit from miscarriage  Dx 3/6/20  .   Caller verbalizes understanding and denies further questions and will call back if further symptoms to triage or questions  . Lilli Ohara RN  - [place] Nurse Advisor   Reason for Disposition    Constant abdominal pain and present > 2 hours    Additional Information    Commented on: Passed tissue (e.g., gray-white)     Dx with miscarriage  At ED    Protocols used:  - THREATENED MISCARRIAGE FOLLOW-UP CALL-A-OH

## 2021-06-06 NOTE — PATIENT INSTRUCTIONS - HE
Amenorrhea:     Urine pregnancy test today is positive.    Referral given to establish prenatal care with a provider who has OB practice.     Prescription sent for prenatal multivitamin.    Do not take any ibuprofen (Motrin or Advil) or naproxen (Aleve).    Do not smoke or chew tobacco.    Do not drink alcohol.    Dysuria:     UA today is negative.     Cough due to allergies:     Continue Singulair.    Gastritis:     Avoid eating spicy food.    Try taking omeprazole every other day.

## 2021-06-06 NOTE — PROGRESS NOTES
ASSESSMENT AND PLAN:  1. Dysuria  Patient complains of pain and discomfort while urinating.  Today's urinalysis revealed no evidence of infection  - Urinalysis-UC if Indicated    2. Missed period    She has missed 2 successive menstrual periods, her pregnancy test positive.  - Pregnancy, Urine    3. Other acute gastritis, presence of bleeding unspecified    Continue omeprazole I did tell her that she should only take the medication of her symptoms are present she should take it on alternate days if she develops epigastric discomfort she should discontinue the medication at present since she only gets the symptoms when she eats spicy food.  Omeprazole can be taken during pregnancy but should be avoided  - omeprazole (PRILOSEC) 20 MG capsule; Take 1 capsule (20 mg total) by mouth daily before breakfast.  Dispense: 30 capsule; Refill: 1    4. Pregnancy test positive    Prenatal vitamins given she will set up with a first OB appointment she has been a mother before, she understands that she cannot drink alcohol take any anti-inflammatories.  She should stop chewing tobacco immediately.  - prenatal vit-iron fum-folic ac (PRENATAL VITAMIN) 27 mg iron- 0.8 mg Tab tablet; Take 1 tablet by mouth daily.  Dispense: 90 tablet; Refill: 3       5.  Cough due to allergies, she is on Singulair has helped with her symptoms.  She has minimal discomfort she has not noticed any congestion in her throat.  This medication is class B and she can take it during her pregnancy.      Orders Placed This Encounter   Procedures     Pregnancy, Urine     Urinalysis-UC if Indicated     Medications Discontinued During This Encounter   Medication Reason     ranitidine (ZANTAC) 150 MG tablet Alternate therapy     omeprazole (PRILOSEC) 20 MG capsule Reorder       No follow-ups on file.    CHIEF COMPLAINT:  Chief Complaint   Patient presents with     Follow Up     cough has improved, and stomach irritation has improved a bit     Dysuria       HISTORY OF  "PRESENT ILLNESS:  Oli is a 35 y.o. female who presents to the clinic today for follow up on cough. Say is present with a Zainab . I last saw her four weeks ago at which time Singulair was working well for her allergies and cough. Her cough remains improved.     Today, she reports dysuria and cramping of the bladder.     The patient missed her period last month and this month. Her LMP was in December. She and her  were not actively trying for pregnancy. Her urine pregnancy test today is positive which Say is happy about. She notes issues with constipation and hemorrhoids with her previous pregnancies.    Her burning abdominal pain has only improved slightly with taking omeprazole. She does eat spicy food. The patient did not have issues with heartburn with her past pregnancies.    REVIEW OF SYSTEMS:   Respiratory: Cough improved.   GI: Burning abdominal pain.   : Dysuria and bladder cramping. Amenorrhea.   All other 10 point review of systems are negative.    PFSH:  She is  with three children. Her children are ages 9, 8, and 3.   She does chew betel nut with tobacco.  Reviewed as below.     TOBACCO USE:  Social History     Tobacco Use   Smoking Status Never Smoker   Smokeless Tobacco Current User   Tobacco Comment    Chews betel nut with tobacco       VITALS:  Vitals:    02/27/20 1129   BP: 99/68   Pulse: 89   Resp: 18   Temp: 98.2  F (36.8  C)   TempSrc: Oral   SpO2: 99%   Weight: 114 lb 9.6 oz (52 kg)   Height: 4' 10\" (1.473 m)     Wt Readings from Last 3 Encounters:   02/27/20 114 lb 9.6 oz (52 kg)   01/30/20 114 lb 7 oz (51.9 kg)   01/02/20 117 lb 8 oz (53.3 kg)     Body mass index is 23.95 kg/m .    PHYSICAL EXAM:  General: Alert, cooperative, no distress, appears stated age  Head: Normocephalic, without obvious abnormality, atraumatic, moist mucous membranes  Eyes: PERRL, conjunctiva/cornea clear, EOM's intact  Neck: Supple, no cervical lymph node enlargement   Lungs: Clear to " auscultation bilaterally, respirations unlabored  Heart: Regular rate and rhythm, S1 and S2 normal, no murmur, rub, or gallop  Neurologic:  A & O x 3.  No tremor, no focal findings. Normal gait.   Psychiatric: Normal affect, good eye contact, well-groomed  Skin: No rash or suspicious lesions noted on exposed skin, non-diaphoretic    DATA REVIEWED:  Additional History from Old Records Summarized (2): none  Decision to Obtain Records (1): none  Radiology Tests Summarized or Ordered (1): none  Labs Reviewed or Ordered (1): Labs ordered.  Medicine Test Summarized or Ordered (1): none  Independent Review of EKG or X-RAY(2 each): none    ICristina, am scribing for and in the presence of, Dr. Owens.    I, Dr. Owens, personally performed the services described in this documentation, as scribed by Cristina Su in my presence, and it is both accurate and complete.    Total amount of time spent with the patient today 25 minutes greater than 50% of this time was spent discussing what precautions she needs to take during her pregnancy  MEDICATIONS:  Current Outpatient Medications   Medication Sig Dispense Refill     cetirizine (ZYRTEC) 10 MG tablet Take 1 tablet (10 mg total) by mouth daily. 30 tablet 2     montelukast (SINGULAIR) 10 mg tablet Take 1 tablet (10 mg total) by mouth daily. 30 tablet 2     omeprazole (PRILOSEC) 20 MG capsule Take 1 capsule (20 mg total) by mouth daily before breakfast. 30 capsule 1     prenatal vit-iron fum-folic ac (PRENATAL VITAMIN) 27 mg iron- 0.8 mg Tab tablet Take 1 tablet by mouth daily. 90 tablet 3     No current facility-administered medications for this visit.        Total Data Points: 1    Please note that this clinical encounter uses voice recognition software, there may be typographical errors present

## 2021-06-06 NOTE — PROGRESS NOTES
"S:  Say Daren is a 35 y.o. female who comes to the clinic today for  1.  Vaginal bleeding.  She was known to be pregnant and then started having significant vaginal bleeding and cramping.  At that time she was seen in the emergency room.  She did have an ultrasound that showed no visible pregnancy in the uterus.  They were unable to rule out an ectopic.  She did have a beta hCG drawn that was over 12,000.   Since that visit her bleeding has slowed.  Her cramping has improved though has not gone.  It does improve with Tylenol.  She denies any fevers.  No dysuria.  No concern for STDs.  She is not certain if she wants a pregnancy at this time though then she says that she would like to have a boy.  She does say that her  respects her wishes when she says she does not want to have sex and that he does not make her have sex.  She also says that in general he is not very interested in using condoms.    I reviewed the pertinent family, social, surgical, medical history.    [place]    Ros: Positive for a headache on the left side.  This is been ongoing for the last day or so.  She has not tried any massage.  It does improve a little bit with use of Tylenol.    ER record was reviewed today     O:  /60 (Patient Site: Right Arm, Patient Position: Sitting, Cuff Size: Adult Regular)   Pulse (!) 102   Temp 97.9  F (36.6  C) (Oral)   Resp 16   Ht 4' 10.66\" (1.49 m)   Wt 116 lb 8 oz (52.8 kg)   LMP 01/01/2020 (Exact Date)   SpO2 97%   BMI 23.80 kg/m    Gen:  Nad, alert  Neck: Supple, no lymphadenopathy noted.  She does have full range of motion though she is a little bit hesitant to turn and look in different directions.  I did help her to do this and did find that she had tenderness over her trapezius on the left as well as her scalenes on the left.  Palpation of these reproduce her headache.  Heart: Regular rate and rhythm.  She was initially tachycardic when she came into " the clinic but her heart rate had become normal by the time I examined her.  Abdomen was soft, nontender, nondistended, no masses organomegaly noted.    There is no problem list on file for this patient.    Current Outpatient Medications on File Prior to Visit   Medication Sig Dispense Refill     cetirizine (ZYRTEC) 10 MG tablet Take 1 tablet (10 mg total) by mouth daily. 30 tablet 2     montelukast (SINGULAIR) 10 mg tablet Take 1 tablet (10 mg total) by mouth daily. 30 tablet 2     omeprazole (PRILOSEC) 20 MG capsule Take 1 capsule (20 mg total) by mouth daily before breakfast. 30 capsule 1     prenatal vit-iron fum-folic ac (PRENATAL VITAMIN) 27 mg iron- 0.8 mg Tab tablet Take 1 tablet by mouth daily. 90 tablet 3     No current facility-administered medications on file prior to visit.           No results found for this or any previous visit (from the past 48 hour(s)).     No images are attached to the encounter or orders placed in the encounter.       Assessment/Plan:  1. Miscarriage  Probable miscarriage.  Will obtain a repeat beta-hCG.  I did  the patient that she should not have intercourse until she is completely done bleeding.  At that time she should use condoms in order to burn further pregnancy until her beta-hCG has normalized.  I did give her a bag of condoms to use today.  If her beta-hCG is decreasing then she will need to return weekly until normalizes.  This was also discussed with the patient today.  If it is increasing will need to obtain a repeat ultrasound to evaluate the pregnancy.  If it is increasing then we will discontinue the ibuprofen.  She can continue with Tylenol.  If she develops any fevers, chills, increased bleeding, increased pain that she does need to be evaluated again for a tubal pregnancy.  - ibuprofen (ADVIL,MOTRIN) 200 MG tablet; Take 2 tablets (400 mg total) by mouth every 6 (six) hours as needed for pain.  Dispense: 60 tablet; Refill: 12  - Beta-hCG, Quantitative  -  Tdap vaccine,  8yo or older,  IM    2. Tension headache  Stretches and exercises given today .     3. Immunization due    - Influenza, Seasonal Quad, PF =/> 6months      The entire conversation today was conducted through the use of a professional .      Kaitlynn Morel   3/9/2020 5:19 PM

## 2021-06-06 NOTE — TELEPHONE ENCOUNTER
Who is calling:  Gladys  Reason for Call:  Patient is scheduled for 3/11 at 11:40am with Dr Russo and is in need of transportation.  Date of last appointment with primary care: 2/27/2020  Okay to leave a detailed message: Yes

## 2021-06-06 NOTE — TELEPHONE ENCOUNTER
Spoke to Dr. Russo who agrees pt NTBS today to rule out ectopic pregnancy.  Spoke to Dr. Morel's CA, Mala, who said to put in before 3 pm.  Pt coming at 230 pm today.  Thanks.

## 2021-06-10 NOTE — PROGRESS NOTES
"Say Daren is a 35 y.o. female who is being evaluated via a billable telephone visit.      Assessment & Plan    Cough  Postnasal discharge  Chronic cough, no new symptoms. Not concerning for covid at this time. Consider testing if any new symptoms. Will refill medications, follow up in 1-2 months if no improvement.   -     montelukast (SINGULAIR) 10 mg tablet; Take 1 tablet (10 mg total) by mouth daily.  -     cetirizine (ZYRTEC) 10 MG tablet; Take 1 tablet (10 mg total) by mouth daily.    Gastritis  Refilled omeprazole, use PRN famotidine.     Follow up in 3 weeks for green card exam.     ============================================      The patient has been notified of following:     \"This telephone visit will be conducted via a call between you and your physician/provider. We have found that certain health care needs can be provided without the need for a physical exam.  This service lets us provide the care you need with a short phone conversation.  If a prescription is necessary we can send it directly to your pharmacy.  If lab work is needed we can place an order for that and you can then stop by our lab to have the test done at a later time.    Telephone visits are billed at different rates depending on your insurance coverage. During this emergency period, for some insurers they may be billed the same as an in-person visit.  Please reach out to your insurance provider with any questions.    If during the course of the call the physician/provider feels a telephone visit is not appropriate, you will not be charged for this service.\"    Patient has given verbal consent to a Telephone visit? Yes    What phone number would you like to be contacted at? [phone]    Patient would like to receive their AVS by AVS Preference: Mail a copy.    Additional provider notes:     She says she is \"moderately well.\"   Cough, tightness in the chest after coughing. Coughing has been over several months - is on medication for " this. From chart review, she has singulair and cetirizine.   This is not a new thing.   Denies any fevers or diarrhea.  Chronic abdominal pain that comes and goes, would like her omeprazole refilled.   No sick contacts.     =========================================  Visit was completed along with Zainab     Options for treatment and follow-up care were reviewed with the patient. Say Daren and/or guardian was engaged and actively involved in the decision making process. Say Daren and/or guardian verbalized understanding of the options discussed and was satisfied with the final plan.    Phone call start time: 1:33PM  Phone call end time: 1:45PM  Phone call duration:  12 minutes    Prashanth Johnson MD

## 2021-06-11 NOTE — TELEPHONE ENCOUNTER
Appt note indicated  could not reach patient.  Calling patient to inquire if they have received call re: transportation.  Unable to get  via phone.  Patient has arrived early.

## 2021-06-11 NOTE — PROGRESS NOTES
Assessment: /    Plan:    1. Gastroesophageal reflux disease without esophagitis  omeprazole (PRILOSEC) 20 MG capsule   2. Dysuria  Urinalysis-UC if Indicated   3. Need for immunization against influenza  Influenza, Seasonal Quad, PF =/> 6months       Continue omeprazole at 40 mg/day.      Subjective:    HPI:  Oli Mercedes is a 35-year-old female presenting for follow-up of ER visit September 1.  Omeprazole was increased to 40 mg daily.  She has been feeling better.  She also takes Tums occasionally, which has helped.    She has had dysuria for a few days.      Review of Systems: No melena, nausea, diarrhea, fever.      Current Outpatient Medications   Medication Sig Dispense Refill     cetirizine (ZYRTEC) 10 MG tablet Take 1 tablet (10 mg total) by mouth daily. 30 tablet 2     famotidine (PEPCID) 20 MG tablet Take 1 tablet (20 mg total) by mouth 2 (two) times a day. 60 tablet 1     ibuprofen (ADVIL,MOTRIN) 200 MG tablet Take 2 tablets (400 mg total) by mouth every 6 (six) hours as needed for pain. 60 tablet 12     montelukast (SINGULAIR) 10 mg tablet Take 1 tablet (10 mg total) by mouth daily. 30 tablet 2     omeprazole (PRILOSEC) 20 MG capsule Take 2 capsules (40 mg total) by mouth daily before breakfast. 30 capsule 11     prenatal vit-iron fum-folic ac (PRENATAL VITAMIN) 27 mg iron- 0.8 mg Tab tablet Take 1 tablet by mouth daily. 90 tablet 3     No current facility-administered medications for this visit.          Objective:    Vitals:    09/11/20 1052   BP: 92/60   Pulse: 90   Temp: 97.7  F (36.5  C)   SpO2: 98%       Gen:  NAD, VSS  Lungs:  normal  Heart:  normal  Abdomen:  No HSM, mass or tenderness  Back without CVA tenderness      UA negative  ADDITIONAL HISTORY SUMMARIZED (2):  reviewed ER note.  DECISION TO OBTAIN EXTRA INFORMATION (1): None.   RADIOLOGY TESTS (1): None.  LABS (1):  UA.  MEDICINE TESTS (1): None.  INDEPENDENT REVIEW (2 each): None.     Total Data Points: 3

## 2021-06-12 NOTE — TELEPHONE ENCOUNTER
Refill Approved    Rx renewed per Medication Renewal Policy. Medication was last renewed on 8/6/20.    Haylee Pink, [facility] Triage/Med Refill 10/13/2020     Requested Prescriptions   Pending Prescriptions Disp Refills     famotidine (PEPCID) 20 MG tablet [Pharmacy Med Name: FAMOTIDINE 20MG TABLETS] 60 tablet 1     Sig: TAKE 1 TABLET(20 MG) BY MOUTH TWICE DAILY       GI Medications Refill Protocol Passed - 10/11/2020  3:57 AM        Passed - PCP or prescribing provider visit in last 12 or next 3 months.     Last office visit with prescriber/PCP: Visit date not found OR same dept: 9/11/2020 Oliver Hudson MD OR same specialty: 9/11/2020 Oliver Hudson MD  Last physical: Visit date not found Last MTM visit: Visit date not found   Next visit within 3 mo: Visit date not found  Next physical within 3 mo: Visit date not found  Prescriber OR PCP: Prashanth Johnson MD  Last diagnosis associated with med order: 1. Other acute gastritis, presence of bleeding unspecified  - famotidine (PEPCID) 20 MG tablet [Pharmacy Med Name: FAMOTIDINE 20MG TABLETS]; TAKE 1 TABLET(20 MG) BY MOUTH TWICE DAILY  Dispense: 60 tablet; Refill: 1    If protocol passes may refill for 12 months if within 3 months of last provider visit (or a total of 15 months).

## 2021-06-16 NOTE — PROGRESS NOTES
"ASSESSMENT and plan   1. Dysuria  Urinalysis was unremarkable awaiting culture results she had similar results approximately 6 months ago we discussed the possibility she may be having incontinence which may be causing some of the symptoms associated with pressure and painful urination I will inform her in 48 hours with culture results she is amenable to trying a medication for this problem as its present for the majority of the year.  - Urinalysis-UC if Indicated    2. Cough    Patient complains of cough she says she feels congestion in her throat and coughs more at night she does not remember taking allergy medicine in the past.  Restarting Singulair will have her see her primary in 2 to 4 weeks  - montelukast (SINGULAIR) 10 mg tablet; Take 1 tablet (10 mg total) by mouth daily.  Dispense: 30 tablet; Refill: 2    3. Gastroesophageal reflux disease without esophagitis    Had been taking omeprazole in the past no longer has abdominal burning can eat spicy food.      There are no Patient Instructions on file for this visit.    Orders Placed This Encounter   Procedures     Urinalysis-UC if Indicated     Medications Discontinued During This Encounter   Medication Reason     montelukast (SINGULAIR) 10 mg tablet Reorder       No follow-ups on file.    CHIEF COMPLAINT:  Chief Complaint   Patient presents with     Cough     for 1 mos      \"lungs and throat are irritated\"     Dysuria       HISTORY OF PRESENT ILLNESS:  Say is a 36 y.o. female who is here because she feels she may have a bladder infection she says it has been painful for her to urinate she does not think that she is urinating more frequently.  She said the symptoms been going on for more than 4 weeks she denies having any blood in the urine.  She also says that she feels it hard to breathe sometimes and she is coughing mainly at night.  She says it is a dry cough.  She notes no fever chills or abdominal pain she cannot eat spicy food.  She denies " "smoking.    REVIEW OF SYSTEMS:      positive for discomfort while urinating  Pulmonary positive for cough that occurs mainly at night  All other systems are negative.    PFSH:  Social history reviewed    TOBACCO USE:  Social History     Tobacco Use   Smoking Status Never Smoker   Smokeless Tobacco Current User   Tobacco Comment    Chews betel nut with tobacco       VITALS:  Vitals:    03/31/21 0753   BP: 100/70   Pulse: 95   Resp: 14   Temp: 98.7  F (37.1  C)   TempSrc: Oral   SpO2: 98%   Weight: 128 lb 1.6 oz (58.1 kg)   Height: 4' 11.13\" (1.502 m)     Wt Readings from Last 3 Encounters:   03/31/21 128 lb 1.6 oz (58.1 kg)   09/11/20 117 lb (53.1 kg)   09/01/20 120 lb (54.4 kg)       PHYSICAL EXAM:  Interactive female sitting comfortably in exam room no acute distress  HEENT neck supple mucous members moist there is no lymph enlargement noted in the neck  Respiratory system clear to auscultation equal breath sounds no wheeze no crackles  Abdomen soft there is no focal tenderness bowel sounds are present no hepatosplenomegaly      DATA REVIEWED:  Additional History from Old Records Summarized (2): 2  Reviewed last visit with Dr. Levi in September 2020.  Decision to Obtain Records (1): 0  Radiology Tests Summarized or Ordered (1): 0  Labs Reviewed or Ordered (1): 1  Medicine Test Summarized or Ordered (1): 0  Independent Review of EKG or X-RAY(2 each): 0    The visit lasted a total of 30 minutes     MEDICATIONS:  Current Outpatient Medications   Medication Sig Dispense Refill     cetirizine (ZYRTEC) 10 MG tablet Take 1 tablet (10 mg total) by mouth daily. 30 tablet 2     famotidine (PEPCID) 20 MG tablet TAKE 1 TABLET(20 MG) BY MOUTH TWICE DAILY 180 tablet 3     ibuprofen (ADVIL,MOTRIN) 200 MG tablet Take 2 tablets (400 mg total) by mouth every 6 (six) hours as needed for pain. 60 tablet 12     montelukast (SINGULAIR) 10 mg tablet Take 1 tablet (10 mg total) by mouth daily. 30 tablet 2     omeprazole (PRILOSEC) 20 " MG capsule Take 2 capsules (40 mg total) by mouth daily before breakfast. 30 capsule 11     prenatal vit-iron fum-folic ac (PRENATAL VITAMIN) 27 mg iron- 0.8 mg Tab tablet Take 1 tablet by mouth daily. 90 tablet 3     No current facility-administered medications for this visit.      Dominic jacobsen md

## 2021-06-17 NOTE — TELEPHONE ENCOUNTER
Please have Dr. Johnson place order/referral to Audiology. Pt has Medicaid and is required. Thank you

## 2021-06-17 NOTE — PROGRESS NOTES
Say Daren is a 36 y.o. female here for cough    ASSESSMENT/PLAN:   Say was seen today for follow-up.    Diagnoses and all orders for this visit:    Pelvic pain in female  Chronic pelvic pain, had a miscarriage last year, regular periods since then.   Will consider pap and US at next visit.   -     Pregnancy, Urine  -     Urinalysis-UC if Indicated    Ear cartilage deformity, right  Bilateral hearing loss, unspecified hearing loss type  Patient has a congenital defect of right ear and says she always had issues hearing with that side but now things the left is not as good. We did have to repeat questions many times, maybe due to hearing or misunderstanding? Patient has never seen ENT and would like to.   -     Ambulatory referral to ENT - [place]  - Ambulatory referral to Audiology    Tension-type headache, not intractable, unspecified chronicity pattern  -     ibuprofen (ADVIL,MOTRIN) 200 MG tablet; Take 2 tablets (400 mg total) by mouth every 6 (six) hours as needed for pain.    Chronic cough, improving  Likely related to seasonal allergies. She is not medicated right now, unsure what medications she is even taking but has multiple listed in her chart. Will bring her back for med review, asked patient to bring all prescribed medications even if she is not currently taking.     Return in about 1 month (around 5/28/2021) for Annual physical.       ======================================================    SUBJECTIVE  Say Daren is a 36 y.o. female here for cough.     Cough. Per chart review, she has had this issue for a long time.   She was seen by another clinician and started on montelukast last month.   She was then seen in the ER and started on omeprazole.     She has been here 2 years, happens a lot in the winter time.   She feels like medications are also helping.   She is currently on one medication for the cough but recently stopped taking it?     She has 6 medications on her list.   She said it's one that she  "got this past month.   She's not sure how many medications the doctors sent, but she only takes one medicine.     She has the cough all day, worse at night. It's dry, never phlegmy  Does wake her from her sleep.       Pain in her bladder for almost 3 months.   UA last month was normal.   The pain is all the time, not only when she pees.   She says it's a shooting pain.   She said there's no chance she's pregnant but cant remember when her last period was - march or April 1st.  Will check UA and pregnancy test, but last UA a month ago was normal..   She did have a miscarriage last year.   Denies pain with intercourse, just like a sharp pain all the time.     She has had a 10lb weight gain in the past 6 months.     She has bad hearing in her right ear (on exam, it is deformed,  Unable to see into canal) but now thinks she is losing hearing in her left ear.       ROS  Complete 10 point review of systems negative except as noted above in HPI    Reviewed Past Medical History, Medications, Family History and Social History in Epic and up to date with no new changes.    OBJECTIVE  /64   Pulse 91   Temp 97.5  F (36.4  C) (Oral)   Resp 16   Ht 4' 11.13\" (1.502 m)   Wt 127 lb 12 oz (57.9 kg)   LMP 03/01/2021 (Approximate)   SpO2 99%   Breastfeeding No   BMI 25.69 kg/m       General: Cooperative, pleasant, in no acute distress  HEENT: PERRL, EOMI.  Deformed right ear, unable to visualize TM. Left ear normal TM. Pharynx normal without erythema.  Tonsils normal without hypertrophy or exudates.  Neck: no lymphadenopathy, no masses  CV: RRR, normal S1/S2, no murmur, rubs, gallops  Resp: No respiratory distress. Clear to auscultation bilaterally. No wheezes, rales, rhonchi  Abd: low midline pelvic pain, nondistended, bowel sounds present  Ext: radial/pedal pulses +2 bilaterally  MSK: Normal muscle tone  Neuro: CN II-XII intact  Skin: warm, well perfused. No rashes  Psych: No suicidal or homicidal ideations, no " self-harm.  Normal affect.    LABS & IMAGES   Results for orders placed or performed in visit on 04/28/21   Pregnancy, Urine   Result Value Ref Range    Pregnancy Test, Urine Negative Negative   Urinalysis-UC if Indicated   Result Value Ref Range    Color, UA Yellow Colorless, Yellow, Straw, Light Yellow    Clarity, UA Clear Clear    Glucose, UA Negative Negative    Protein, UA Negative Negative    Bilirubin, UA Negative Negative    Urobilinogen, UA 0.2 E.U./dL 0.2 E.U./dL, 1.0 E.U./dL    pH, UA 5.5 5.0 - 8.0    Blood, UA Negative Negative    Ketones, UA Negative Negative    Nitrite, UA Negative Negative    Leukocytes, UA Negative Negative    Specific Gravity, UA 1.015 1.005 - 1.030         ======================================================  Spent 30 min face to face with patient with more the 50% spent in counseling, reviewing chart, and coordination of care and discussing problems listed above.     Visit was completed along with Zainab     Options for treatment and follow-up care were reviewed with the patient. Say Daren and/or guardian was engaged and actively involved in the decision making process. Say Draen and/or guardian verbalized understanding of the options discussed and was satisfied with the final plan.      Prashanth Johnson MD

## 2021-06-25 NOTE — TELEPHONE ENCOUNTER
Who is calling:  Patient  Reason for Call:  Patient has been scheduled for PHY for 6/23/21 All Demographics have been verified and corrected/patient is needing transportation for that appt. Please contact patient for further information.  Date of last appointment with primary care: NA  Okay to leave a detailed message: Yes

## 2021-07-01 ENCOUNTER — COMMUNICATION - HEALTHEAST (OUTPATIENT)
Dept: FAMILY MEDICINE | Facility: CLINIC | Age: 36
End: 2021-07-01

## 2021-07-16 ENCOUNTER — OFFICE VISIT (OUTPATIENT)
Dept: OTOLARYNGOLOGY | Facility: CLINIC | Age: 36
End: 2021-07-16
Attending: FAMILY MEDICINE
Payer: COMMERCIAL

## 2021-07-16 ENCOUNTER — OFFICE VISIT (OUTPATIENT)
Dept: AUDIOLOGY | Facility: CLINIC | Age: 36
End: 2021-07-16
Payer: COMMERCIAL

## 2021-07-16 DIAGNOSIS — H90.A22 SENSORINEURAL HEARING LOSS (SNHL) OF LEFT EAR WITH RESTRICTED HEARING OF RIGHT EAR: Primary | ICD-10-CM

## 2021-07-16 DIAGNOSIS — H90.A31 MIXED CONDUCTIVE AND SENSORINEURAL HEARING LOSS OF RIGHT EAR WITH RESTRICTED HEARING OF LEFT EAR: ICD-10-CM

## 2021-07-16 DIAGNOSIS — Q16.1 CONGENITAL ATRESIA OF RIGHT EXTERNAL EAR: Primary | ICD-10-CM

## 2021-07-16 PROCEDURE — 92550 TYMPANOMETRY & REFLEX THRESH: CPT | Mod: 52 | Performed by: AUDIOLOGIST

## 2021-07-16 PROCEDURE — 92557 COMPREHENSIVE HEARING TEST: CPT | Mod: 52 | Performed by: AUDIOLOGIST

## 2021-07-16 PROCEDURE — 99203 OFFICE O/P NEW LOW 30 MIN: CPT | Mod: 25 | Performed by: OTOLARYNGOLOGY

## 2021-07-16 PROCEDURE — 99207 PR NO CHARGE LOS: CPT | Performed by: AUDIOLOGIST

## 2021-07-16 PROCEDURE — 92504 EAR MICROSCOPY EXAMINATION: CPT | Performed by: OTOLARYNGOLOGY

## 2021-07-16 NOTE — PROGRESS NOTES
AUDIOLOGY REPORT    SUMMARY: Audiology visit completed. See audiogram for results.     RECOMMENDATIONS: Follow-up with ENT.    Tabitha Cottrell, [facility]  [place] Licensed Audiologist #[id number]

## 2021-07-16 NOTE — LETTER
7/16/2021         RE: Say Daren  [address] [place] [address]        Dear Colleague,    Thank you for referring your patient, Say Daren, to the [facility]. Please see a copy of my visit note below.    CHIEF COMPLAINT:  Hearing Concern      HISTORY OF PRESENT ILLNESS    Say was seen at the behest of HoPrashanth MD for ear related problems.  She has congenital atresia of the right ear.  She states she has never had surgery on the ear.  She has no serviceable hearing on this side.  She says the ear drains constantly despite her cleaning it on a daily basis.  No dizziness or vertigo.  No history of otologic surgery.    Ear cartilage deformity, right  Bilateral hearing loss, unspecified hearing loss type  Patient has a congenital defect of right ear and says she always had issues hearing with that side but now things the left is not as good. We did have to repeat questions many times, maybe due to hearing or misunderstanding? Patient has never seen ENT and would like to.   -     Ambulatory referral to ENT - [place]  -     Ambulatory referral to Audiology     REVIEW OF SYSTEMS    Review of Systems as per HPI and PMHx, otherwise 10 system review system are negative.     Patient has no known allergies.     There were no vitals taken for this visit.    HEAD: Normal appearance and symmetry:  No cutaneous lesions.      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  Cavity is packed with ciprodex and gelfoam under binocular microscope.      NOSE:     Dorsum:   straight  Septum:  midline  Mucosa:  moist  Inferior turbinates:  wnl        ORAL CAVITY/OROPHARYNX:     Lips:  Normal.  Tongue: normal, midline  Mucosa:   no lesions  Tonsils:  wnl     NECK:  Trachea:  midline.              Thyroid:  normal              Adenopathy:  none        NEURO:   Alert and Oriented     GAIT AND  STATION:  normal     RESPIRATORY:   Symmetry and Respiratory effort     PSYCH:  Normal mood and affect     SKIN:   warm and dry         IMPRESSION:    Encounter Diagnosis   Name Primary?     Congenital atresia of right external ear Yes          RECOMMENDATIONS:       Return 1 month for CT review.         Again, thank you for allowing me to participate in the care of your patient.        Sincerely,        Tony Pardo MD

## 2021-07-16 NOTE — PROGRESS NOTES
CHIEF COMPLAINT:  Hearing Concern      HISTORY OF PRESENT ILLNESS    Say was seen at the behest of HoPrashanth MD for ear related problems.  She has congenital atresia of the right ear.  She states she has never had surgery on the ear.  She has no serviceable hearing on this side.  She says the ear drains constantly despite her cleaning it on a daily basis.  No dizziness or vertigo.  No history of otologic surgery.    Ear cartilage deformity, right  Bilateral hearing loss, unspecified hearing loss type  Patient has a congenital defect of right ear and says she always had issues hearing with that side but now things the left is not as good. We did have to repeat questions many times, maybe due to hearing or misunderstanding? Patient has never seen ENT and would like to.   -     Ambulatory referral to ENT - [place]  -     Ambulatory referral to Audiology     REVIEW OF SYSTEMS    Review of Systems as per HPI and PMHx, otherwise 10 system review system are negative.     Patient has no known allergies.     There were no vitals taken for this visit.    HEAD: Normal appearance and symmetry:  No cutaneous lesions.      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  Cavity is packed with ciprodex and gelfoam under binocular microscope.      NOSE:     Dorsum:   straight  Septum:  midline  Mucosa:  moist  Inferior turbinates:  wnl        ORAL CAVITY/OROPHARYNX:     Lips:  Normal.  Tongue: normal, midline  Mucosa:   no lesions  Tonsils:  wnl     NECK:  Trachea:  midline.              Thyroid:  normal              Adenopathy:  none        NEURO:   Alert and Oriented     GAIT AND STATION:  normal     RESPIRATORY:   Symmetry and Respiratory effort     PSYCH:  Normal mood and affect     SKIN:   warm and dry         IMPRESSION:    Encounter Diagnosis   Name Primary?     Congenital atresia of right external ear Yes           RECOMMENDATIONS:       Return 1 month for CT review.

## 2021-07-22 NOTE — LETTER
Letter by Mala Rivera RN at      Author: Mala Rivera RN Service: -- Author Type: --    Filed:  Encounter Date: 7/1/2021 Status: (Other)         Say Daren [address] [place] [address]             July 1, 2021         Dear Ms. Mercedes,    We are happy to inform you that your recent Pap smear and Human Papillomavirus (HPV) test results are normal and negative.    It is recommended that you have your next Pap smear and Human Papillomavirus (HPV) test in 5 years. You will also need to return to the clinic every year for an annual wellness visit.    If you have additional questions regarding this result, please contact our office and we will be happy to assist you.      Sincerely,    Your [facility] Care Team

## 2021-07-30 ENCOUNTER — HOSPITAL ENCOUNTER (OUTPATIENT)
Dept: CT IMAGING | Facility: HOSPITAL | Age: 36
Discharge: HOME | End: 2021-07-30
Attending: OTOLARYNGOLOGY | Admitting: OTOLARYNGOLOGY
Payer: COMMERCIAL

## 2021-07-30 DIAGNOSIS — Q16.1 CONGENITAL ATRESIA OF RIGHT EXTERNAL EAR: ICD-10-CM

## 2021-07-30 PROCEDURE — 70480 CT ORBIT/EAR/FOSSA W/O DYE: CPT

## 2021-08-12 ENCOUNTER — OFFICE VISIT (OUTPATIENT)
Dept: FAMILY MEDICINE | Facility: CLINIC | Age: 36
End: 2021-08-12
Payer: COMMERCIAL

## 2021-08-12 VITALS
WEIGHT: 128.75 LBS | OXYGEN SATURATION: 96 % | HEIGHT: 59 IN | TEMPERATURE: 97.8 F | RESPIRATION RATE: 16 BRPM | DIASTOLIC BLOOD PRESSURE: 62 MMHG | SYSTOLIC BLOOD PRESSURE: 102 MMHG | BODY MASS INDEX: 25.96 KG/M2 | HEART RATE: 106 BPM

## 2021-08-12 DIAGNOSIS — M79.672 LEFT FOOT PAIN: ICD-10-CM

## 2021-08-12 DIAGNOSIS — K29.00 OTHER ACUTE GASTRITIS, PRESENCE OF BLEEDING UNSPECIFIED: ICD-10-CM

## 2021-08-12 DIAGNOSIS — L50.9 URTICARIA: ICD-10-CM

## 2021-08-12 DIAGNOSIS — Z11.1 SCREENING EXAMINATION FOR PULMONARY TUBERCULOSIS: Primary | ICD-10-CM

## 2021-08-12 DIAGNOSIS — K21.9 GASTROESOPHAGEAL REFLUX DISEASE WITHOUT ESOPHAGITIS: ICD-10-CM

## 2021-08-12 DIAGNOSIS — R09.82 POSTNASAL DISCHARGE: ICD-10-CM

## 2021-08-12 DIAGNOSIS — Z23 HIGH PRIORITY FOR 2019-NCOV VACCINE: ICD-10-CM

## 2021-08-12 PROCEDURE — 91301 COVID-19,PF,MODERNA: CPT | Performed by: FAMILY MEDICINE

## 2021-08-12 PROCEDURE — 36415 COLL VENOUS BLD VENIPUNCTURE: CPT | Performed by: FAMILY MEDICINE

## 2021-08-12 PROCEDURE — 99214 OFFICE O/P EST MOD 30 MIN: CPT | Performed by: FAMILY MEDICINE

## 2021-08-12 PROCEDURE — 0011A COVID-19,PF,MODERNA: CPT | Performed by: FAMILY MEDICINE

## 2021-08-12 PROCEDURE — 86481 TB AG RESPONSE T-CELL SUSP: CPT | Performed by: FAMILY MEDICINE

## 2021-08-12 RX ORDER — OMEPRAZOLE 40 MG/1
40 CAPSULE, DELAYED RELEASE ORAL DAILY
Qty: 90 CAPSULE | Refills: 3 | Status: SHIPPED | OUTPATIENT
Start: 2021-08-12 | End: 2021-11-11

## 2021-08-12 RX ORDER — NAPROXEN 500 MG/1
500 TABLET ORAL 2 TIMES DAILY PRN
Qty: 60 TABLET | Refills: 2 | Status: SHIPPED | OUTPATIENT
Start: 2021-08-12 | End: 2022-05-31

## 2021-08-12 RX ORDER — CETIRIZINE HYDROCHLORIDE 10 MG/1
10 TABLET ORAL 2 TIMES DAILY
Qty: 60 TABLET | Refills: 3 | Status: SHIPPED | OUTPATIENT
Start: 2021-08-12 | End: 2021-11-11

## 2021-08-12 ASSESSMENT — MIFFLIN-ST. JEOR: SCORE: 1176.7

## 2021-08-12 NOTE — LETTER
August 16, 2021    To whom it may concern:       Below are the results for Oli Mercedes.   Her quantiferon test was negative indicating she does not have active TB.   She was also screened and underwent examination in clinic this past week for signs, symptoms, and risk factors for TB.     Resulted Orders   Quantiferon TB Gold Plus Grey Tube   Result Value Ref Range    Quantiferon Nil Tube 0.12 IU/mL   Quantiferon TB Gold Plus Green Tube   Result Value Ref Range    Quantiferon TB1 Tube 0.15 IU/mL   Quantiferon TB Gold Plus Yellow Tube   Result Value Ref Range    Quantiferon TB2 Tube 0.15    Quantiferon TB Gold Plus Purple Tube   Result Value Ref Range    Quantiferon Mitogen 10.00 IU/mL   Quantiferon TB Gold Plus   Result Value Ref Range    Quantiferon-TB Gold Plus Negative Negative      Comment:      No interferon gamma response to M.tuberculosis antigens was detected. Infection with M.tuberculosis is unlikely, however a single negative result does not exclude infection. In patients at high risk for infection, a second test should be considered in accordance with the 2017 ATS/IDSA/CDC Clinical Pract  ice Guidelines for Diagnosis of Tuberculosis in Adults and Children     TB1 Ag minus Nil Value 0.03 IU/mL    TB2 Ag minus Nil Value 0.03 IU/mL    Mitogen minus Nil Result 9.88 IU/mL    Nil Result 0.12 IU/mL     If you have any questions or concerns, please call the clinic at the number listed above.     Sincerely,            Prashanth Johnson MD

## 2021-08-12 NOTE — PROGRESS NOTES
ASSESSMENT/PLAN:   Say was seen today for follow up, tb test and imm/inj.    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  Quads.  Gold will be done, we have a form for [facility] to send them when it is completed.  No personal history of TB, has never received vaccine, no symptoms of TB.  -     REVIEW OF HEALTH MAINTENANCE PROTOCOL ORDERS  -     Quantiferon-TB Gold Plus    Urticaria  Postnasal discharge  -     cetirizine (ZYRTEC) 10 MG tablet; Take 1 tablet (10 mg) by mouth 2 times daily    Other acute gastritis, presence of bleeding unspecified  Gastroesophageal reflux disease without esophagitis  -     omeprazole (PRILOSEC) 40 MG DR capsule; Take 1 capsule (40 mg) by mouth daily      Left foot pain  Patient was confused by medications, was taking naproxen for her stomach pain and not the omeprazole.  Will refill medication, spent a significant time reviewing the medications with her with therefore.  She only has one as needed medication which is naproxen.  We circled that 1, highlighted and discussed with her that this is for pain, only as needed, always take with food, may cause stomach pain if you take it too often.  -     naproxen (NAPROSYN) 500 MG tablet; Take 1 tablet (500 mg) by mouth 2 times daily as needed    High priority for 2019-nCoV vaccine  -     MODERNA COVID-19 VACCINE 2ND DOSE APPT; Future  -     COVID-19,PF,MODERNA          Return in about 3 months (around 11/12/2021) for Abdominal pain, medication check.   She has follow-up with ENT about her right ear coming up next month.    ======================================================    SUBJECTIVE  Say Daren is a 36 year old female here for itching, tb test    Itching/urticaria.  Significantly improved.  Patient thinks her medications are helping but she is not sure which medication is what.    Stomach pain.  She was taking the naproxen because  "she thought it was for her stomach pain, she actually should be on omeprazole.  She can take the naproxen only as needed, never more than twice a day because it can actually make her stomach pain worse which is why she has been having more stomach pain lately.  This is her only as needed medication so we will circulate on her AVS and repeat 1 more time to make sure she understands that this is the as needed medication to be taken with food only when she has pain.  Avoid taking it every single day if possible.  We should consider testing her for H. pylori if this pain continues.    Covid vaccine.  Patient said she is willing to do it today, she wants the rest of her family be seen as well and her  to get of her chart to.  We will help her schedule that.    She needs a TB test for work, we discussed the options with [facility] versus QuantiFERON gold.  She wants to do the QuantiFERON gold, this is acceptable considering her risk factors and in the times of Covid this would reduce the need for her to come back multiple times to the clinic.  She has no history of TB, denies any knowledge of getting a vaccine for TB, has never been near anyone with TB, does not have any shortness of breath, night sweats, weight loss, rashes, cough.          ROS  Complete 10 point review of systems negative except as noted above in HPI      OBJECTIVE  /62   Pulse 106   Temp 97.8  F (36.6  C) (Oral)   Resp 16   Ht 1.502 m (4' 11.13\")   Wt 58.4 kg (128 lb 12 oz)   LMP 07/04/2021 (Within Days)   SpO2 96%   BMI 25.89 kg/m       General: Cooperative, pleasant, in no acute distress  HEENT: PERRL, EOMI.  TM normal bilaterally.  Deformed external cartilage of right ear.  Pharynx normal without erythema.  Tonsils normal without hypertrophy or exudates.  Neck: no lymphadenopathy, no masses  CV: RRR, normal S1/S2, no murmur, rubs, gallops  Resp: No respiratory distress. Clear to auscultation bilaterally. No wheezes, rales, " rhonchi  Abd: Nontender, nondistended, bowel sounds present  Ext: radial/pedal pulses +2 bilaterally  MSK: Normal muscle tone  Neuro: CN II-XII intact  Skin: warm, well perfused. No rashes  Psych: No suicidal or homicidal ideations, no self-harm.  Normal affect.    LABS & IMAGES   Results for orders placed or performed in visit on 08/12/21   Quantiferon-TB Gold Plus     Status: None (In process)    Specimen: Peripheral Blood    Narrative    The following orders were created for panel order Quantiferon-TB Gold Plus.  Procedure                               Abnormality         Status                     ---------                               -----------         ------                     Quantiferon TB Gold Plus...[[id number]]                      In process                 Quantiferon TB Gold Plus...[[id number]]                      In process                 Quantiferon TB Gold Plus...[[id number]]                      In process                 Quantiferon TB Gold Plus...[[id number]]                      In process                   Please view results for these tests on the individual orders.         ======================================================  30 minutes spent on the date of the encounter doing chart review, history and exam, documentation and further activities per the note    Visit was completed along with Zainab     Options for treatment and follow-up care were reviewed with the patient. Say Daren and/or guardian was engaged and actively involved in the decision making process. Say Daren and/or guardian verbalized understanding of the options discussed and was satisfied with the final plan.      Prashanth Johnson MD

## 2021-08-16 ENCOUNTER — OFFICE VISIT (OUTPATIENT)
Dept: OTOLARYNGOLOGY | Facility: CLINIC | Age: 36
End: 2021-08-16
Payer: COMMERCIAL

## 2021-08-16 DIAGNOSIS — Q16.1 CONGENITAL ATRESIA OF RIGHT EXTERNAL EAR: Primary | ICD-10-CM

## 2021-08-16 LAB
GAMMA INTERFERON BACKGROUND BLD IA-ACNC: 0.12 IU/ML
M TB IFN-G BLD-IMP: NEGATIVE
M TB IFN-G CD4+ BCKGRND COR BLD-ACNC: 9.88 IU/ML
MITOGEN IGNF BCKGRD COR BLD-ACNC: 0.03 IU/ML
MITOGEN IGNF BCKGRD COR BLD-ACNC: 0.03 IU/ML
QUANTIFERON MITOGEN: 10 IU/ML
QUANTIFERON NIL TUBE: 0.12 IU/ML
QUANTIFERON TB1 TUBE: 0.15 IU/ML
QUANTIFERON TB2 TUBE: 0.15

## 2021-08-16 PROCEDURE — 99213 OFFICE O/P EST LOW 20 MIN: CPT | Performed by: OTOLARYNGOLOGY

## 2021-08-16 NOTE — PROGRESS NOTES
CHIEF COMPLAINT:  Recheck      HISTORY OF PRESENT ILLNESS    Say was seen in follow up for CT review.  Ear drainage has improved since last visit  No new concerns.  She is here for CT review.    CT Temporal Bone 7/30/2021    Narrative & Impression   EXAM: CT TEMPORAL BONES WO CONTRAST  LOCATION: [facility]  DATE/TIME: 7/30/2021 8:37 AM     INDICATION: Congenital atresia of the right ear.  COMPARISON: None.  TECHNIQUE:  Routine without contrast including dedicated thin section multiplanar reformats of each temporal bone. Dose reduction techniques were used.     FINDINGS:  RIGHT TEMPORAL BONE: The soft tissue opening for the ear on the right is small and the right auricle appears misshapen. The external auditory canal is otherwise unremarkable. No definite tympanic membrane identified. The head of the malleus is present   and normally located. The neck and manubrium of the malleus poorly identified and may be atretic or eroded. The incus is adjacent to the head of the malleus. The short delmer of the incus is present. The long delmer of the incus is not visualized and does   not articulate with the visualized stapes. There is complete opacification of the epitympanum and mastoid air cells on the right by fluid/mucosal thickening. The mesotympanum and hypotympanum are well-aerated. No evidence for otosclerosis. Normal   cochlea, semicircular canals, vestibule, and vestibular aqueduct. The cochlear aperture appears normal in diameter and similar in size to the left-sided cochlear aperture. Intact bony carotid canal and facial nerve canal.      LEFT TEMPORAL BONE: Normal external auditory canal and tympanic membrane. Normal middle ear cavity and ossicles. No mastoid effusion. No evidence for otosclerosis. Normal cochlea, semicircular canals, vestibule, and vestibular aqueduct. Intact bony   carotid canal and facial nerve canal.      OTHER: No visualized paranasal sinus mucosal disease. The visualized  intracranial compartment is unremarkable.                                                                      IMPRESSION:  1.  Multiple abnormalities of the right ear including: Small soft tissue opening at the lateral end of the external auditory canal, lack of an identifiable tympanic membrane and atresia or erosion of the manubrium of the malleus and long delmer of the   incus with no definite articulation between the incus and stapes.  2.  Labyrinthine structures on the right are normal.  3.  Normal left temporal bone.              REVIEW OF SYSTEMS    Review of Systems: a 10-system review is reviewed at this encounter.  See scanned document.     Patient has no known allergies.     PHYSICAL EXAM:        HEAD: Normal appearance and symmetry:  No cutaneous lesions.      EARS:          Right ear:               NOSE:    Dorsum:   straight       ORAL CAVITY/OROPHARYNX:    Lips:  Normal.     NECK:  Trachea:  midline       NEURO:   Alert and Oriented    GAIT AND STATION:  normal     RESPIRATORY:   Symmetry and Respiratory effort    PSYCH:   normal mood and affect    SKIN:  warm and dry         IMPRESSION:   Encounter Diagnosis   Name Primary?     Congenital atresia of right external ear Yes            RECOMMENDATIONS:     Referral to [facility] (Dr. Karla Light) to discuss atresia surgery.

## 2021-08-16 NOTE — LETTER
8/16/2021         RE: Say Daren  [address] [place] [address]        Dear Colleague,    Thank you for referring your patient, Say Daren, to the [facility]. Please see a copy of my visit note below.    CHIEF COMPLAINT:  Recheck      HISTORY OF PRESENT ILLNESS    Say was seen in follow up for CT review.  Ear drainage has improved since last visit  No new concerns.  She is here for CT review.    CT Temporal Bone 7/30/2021    Narrative & Impression   EXAM: CT TEMPORAL BONES WO CONTRAST  LOCATION: [facility]  DATE/TIME: 7/30/2021 8:37 AM     INDICATION: Congenital atresia of the right ear.  COMPARISON: None.  TECHNIQUE:  Routine without contrast including dedicated thin section multiplanar reformats of each temporal bone. Dose reduction techniques were used.     FINDINGS:  RIGHT TEMPORAL BONE: The soft tissue opening for the ear on the right is small and the right auricle appears misshapen. The external auditory canal is otherwise unremarkable. No definite tympanic membrane identified. The head of the malleus is present   and normally located. The neck and manubrium of the malleus poorly identified and may be atretic or eroded. The incus is adjacent to the head of the malleus. The short delmer of the incus is present. The long delmer of the incus is not visualized and does   not articulate with the visualized stapes. There is complete opacification of the epitympanum and mastoid air cells on the right by fluid/mucosal thickening. The mesotympanum and hypotympanum are well-aerated. No evidence for otosclerosis. Normal   cochlea, semicircular canals, vestibule, and vestibular aqueduct. The cochlear aperture appears normal in diameter and similar in size to the left-sided cochlear aperture. Intact bony carotid canal and facial nerve canal.      LEFT TEMPORAL BONE: Normal external auditory canal and tympanic membrane. Normal middle ear cavity and ossicles. No mastoid  effusion. No evidence for otosclerosis. Normal cochlea, semicircular canals, vestibule, and vestibular aqueduct. Intact bony   carotid canal and facial nerve canal.      OTHER: No visualized paranasal sinus mucosal disease. The visualized intracranial compartment is unremarkable.                                                                      IMPRESSION:  1.  Multiple abnormalities of the right ear including: Small soft tissue opening at the lateral end of the external auditory canal, lack of an identifiable tympanic membrane and atresia or erosion of the manubrium of the malleus and long delmer of the   incus with no definite articulation between the incus and stapes.  2.  Labyrinthine structures on the right are normal.  3.  Normal left temporal bone.              REVIEW OF SYSTEMS    Review of Systems: a 10-system review is reviewed at this encounter.  See scanned document.     Patient has no known allergies.     PHYSICAL EXAM:        HEAD: Normal appearance and symmetry:  No cutaneous lesions.      EARS:          Right ear:               NOSE:    Dorsum:   straight       ORAL CAVITY/OROPHARYNX:    Lips:  Normal.     NECK:  Trachea:  midline       NEURO:   Alert and Oriented    GAIT AND STATION:  normal     RESPIRATORY:   Symmetry and Respiratory effort    PSYCH:   normal mood and affect    SKIN:  warm and dry         IMPRESSION:   Encounter Diagnosis   Name Primary?     Congenital atresia of right external ear Yes            RECOMMENDATIONS:     Referral to [facility] (Dr. Karla Light) to discuss atresia surgery.       Again, thank you for allowing me to participate in the care of your patient.        Sincerely,        Tony Pardo MD

## 2021-08-17 ENCOUNTER — TELEPHONE (OUTPATIENT)
Dept: OTOLARYNGOLOGY | Facility: CLINIC | Age: 36
End: 2021-08-17
Payer: COMMERCIAL

## 2021-08-17 DIAGNOSIS — Q16.1 CONGENITAL ATRESIA OF RIGHT EXTERNAL EAR: Primary | ICD-10-CM

## 2021-08-17 NOTE — TELEPHONE ENCOUNTER
[facility] Call Center    Phone Message    May a detailed message be left on voicemail: no     Reason for Call: Appointment Intake    Referring Provider Name: Tony Pardo MD in [facility] ENT  Diagnosis and/or Symptoms: Congenital atresia of right external ear [Q16.1]  Additional Information:   Karla Light MD        Action Taken: Message routed to:  [facility] ([facility]): [facility] [[id number]] - dx not in protos    Travel Screening: Not Applicable

## 2021-08-18 NOTE — TELEPHONE ENCOUNTER
FUTURE VISIT INFORMATION      FUTURE VISIT INFORMATION:    Date: 9/28/2021    Time: 8:40AM    [place] [facility] INFORMATION:    Referring provider:  Tony Pardo MD    Referring providers clinic:  [facility] ENT     Reason for visit/diagnosis  referred by Tony Pardo MD in [facility] ENT for Congenital atresia of right external ear [Q16.1]WIN prior    RECORDS REQUESTED FROM:       Clinic name Comments Records Status Imaging Status   [facility] ENT & Audiology  8/16/2021 note from Tony Pardo MD  7/16/2021 audiogram  EPIC    Imaging 7/30/2021 CT Temporal bone Epic NIL

## 2021-09-07 ENCOUNTER — ALLIED HEALTH/NURSE VISIT (OUTPATIENT)
Dept: FAMILY MEDICINE | Facility: CLINIC | Age: 36
End: 2021-09-07
Payer: COMMERCIAL

## 2021-09-07 DIAGNOSIS — Z23 IMMUNIZATION DUE: Primary | ICD-10-CM

## 2021-09-07 PROCEDURE — 90686 IIV4 VACC NO PRSV 0.5 ML IM: CPT

## 2021-09-07 PROCEDURE — 90471 IMMUNIZATION ADMIN: CPT

## 2021-09-07 PROCEDURE — 99207 PR NO CHARGE NURSE ONLY: CPT

## 2021-09-16 ENCOUNTER — IMMUNIZATION (OUTPATIENT)
Dept: NURSING | Facility: CLINIC | Age: 36
End: 2021-09-16
Attending: FAMILY MEDICINE
Payer: COMMERCIAL

## 2021-09-16 PROCEDURE — 0012A PR COVID VAC MODERNA 100 MCG/0.5 ML IM: CPT

## 2021-09-16 PROCEDURE — 91301 PR COVID VAC MODERNA 100 MCG/0.5 ML IM: CPT

## 2021-09-28 ENCOUNTER — PRE VISIT (OUTPATIENT)
Dept: OTOLARYNGOLOGY | Facility: CLINIC | Age: 36
End: 2021-09-28

## 2021-09-28 ENCOUNTER — OFFICE VISIT (OUTPATIENT)
Dept: OTOLARYNGOLOGY | Facility: CLINIC | Age: 36
End: 2021-09-28
Payer: COMMERCIAL

## 2021-09-28 ENCOUNTER — OFFICE VISIT (OUTPATIENT)
Dept: AUDIOLOGY | Facility: CLINIC | Age: 36
End: 2021-09-28
Attending: OTOLARYNGOLOGY
Payer: COMMERCIAL

## 2021-09-28 VITALS — HEART RATE: 95 BPM | OXYGEN SATURATION: 99 % | TEMPERATURE: 98 F

## 2021-09-28 DIAGNOSIS — Q16.1 CONGENITAL ATRESIA OF RIGHT EXTERNAL EAR: ICD-10-CM

## 2021-09-28 DIAGNOSIS — Q16.1 CONGENITAL ATRESIA OF RIGHT EXTERNAL EAR: Primary | ICD-10-CM

## 2021-09-28 DIAGNOSIS — H90.6 MIXED HEARING LOSS, BILATERAL: Primary | ICD-10-CM

## 2021-09-28 PROCEDURE — 92504 EAR MICROSCOPY EXAMINATION: CPT | Performed by: OTOLARYNGOLOGY

## 2021-09-28 PROCEDURE — 92567 TYMPANOMETRY: CPT | Mod: 52 | Performed by: AUDIOLOGIST

## 2021-09-28 PROCEDURE — 92557 COMPREHENSIVE HEARING TEST: CPT | Performed by: AUDIOLOGIST

## 2021-09-28 PROCEDURE — 99214 OFFICE O/P EST MOD 30 MIN: CPT | Mod: 25 | Performed by: OTOLARYNGOLOGY

## 2021-09-28 ASSESSMENT — PAIN SCALES - GENERAL: PAINLEVEL: NO PAIN (0)

## 2021-09-28 NOTE — PROGRESS NOTES
Neurotology Clinic      Name: Oli Mercedes  MRN: [id number]  Age: 36 year old  : 1985  Referring provider: Dr. Tony Pardo, [facility] ENT  2021      Chief Complaint:   Consultation    History of Present Illness:   Oli Mercedes is a 36 year old female who presents for consultation regarding congenital atresia of the right external ear. Consultation was requested by Dr. Tony Pardo, [facility] E[facility]. The patient was referred to Dr. Pardo from her PCP regarding her congenital atresia of her right ear. At this visit two months ago (2021), the patient reported no serviceable hearing on this side, and she denied any history of surgery on this ear, dizziness, or vertigo. She noted her right ear has always had difficulties with hearing, but reported that her left ear also seemed to be worse recently. The patient also stated that her right ear has been persistently draining, despite cleaning this daily with a variety of implements for many years. Dr. Pardo took a picture of the patient's right ear at a post-CT visit last month (2021) and referred the patient here for further evaluation.    Today, she reports that she cannot hear anymore out of her right side, and endorses intermittent pain on this ear. She denies history of any ear surgeries.    Review of Systems:   Pertinent items are noted in HPI or as in patient entered ROS below, remainder of complete ROS is negative.    ENT ROS 2021   Ears, Nose, Throat Hearing loss      Active Medications:     Current Outpatient Medications:      cetirizine (ZYRTEC) 10 MG tablet, Take 1 tablet (10 mg) by mouth 2 times daily, Disp: 60 tablet, Rfl: 3     naproxen (NAPROSYN) 500 MG tablet, Take 1 tablet (500 mg) by mouth 2 times daily as needed, Disp: 60 tablet, Rfl: 2     omeprazole (PRILOSEC) 40 MG DR capsule, Take 1 capsule (40 mg) by mouth daily, Disp: 90 capsule, Rfl: 3      Allergies:   Patient has no known allergies.      Past Medical History:  No  past medical history on file.  Patient Active Problem List   Diagnosis     Urticaria      Past Surgical History:  No past surgical history on file.    Family History:   No family history on file.      Social History:   Social History     Tobacco Use     Smoking status: Never Smoker     Smokeless tobacco: Former User   Substance Use Topics     Alcohol use: Never     Drug use: Never      Physical Exam:   Pulse 95   Temp 98  F (36.7  C)   SpO2 99%      Constitutional:  The patient was accompanied by her daughter, well-groomed, and in no acute distress.     Skin: Normal:  warm and pink without rash   Neurologic: Alert and oriented x 3.  CN's III-XII within normal limits.  Voice normal.    Psychiatric: The patient's affect was calm, cooperative, and appropriate.     Communication:  Normal; communicates verbally, normal voice quality.   Respiratory: Breathing comfortably without stridor or exertion of accessory muscles.    Eyes: Pupils were equal and reactive.  Extraocular movement intact.     Ears: Pinnae and tragus non-tender.  EAC's and TM's were evaluated, results below.        Otologic microscope exam:  Right ear: Microtia, small superior remnant, tag at the level of the lobule, and posterior to that is a small opening. This just admits a #7 suction at the opening. This is dry. Beyond the opening, there is an ear canal.  I can see yeast in the canal laterally.  Medially, there is a pink glistening surface, and I cannot tell if that is post-obstructive membrane, inflammatory tissue, or cholesteatoma.      Left ear: Grade I microtia. There is a through and through hole in the auricle posterior to the triangular fossa. Ear canal is patent. The TM is intact, but looks like it has either been repaired or remodeled from infections posteriorly.    Audiogram:  AUDIOGRAM: She underwent an audiogram today.     This demonstrated:   Right profound loss - question crossover of bone line given profound thresholds and reduction  in word recognition  Left moderate mixed hearing loss        Right: Speech reception threshold is 115 dB with 32% word recognition at 115 dB. DNT Tympanogram  Left: Speech reception threshold is 45 dB with 88% word recognition at 75 dB. Tympanogram A type     Audiogram was independently reviewed.    Imaging:  CT TEMPORAL BONES WO CONTRAST:  (07/30/2021)                                                      IMPRESSION:  1.  Multiple abnormalities of the right ear including: Small soft tissue opening at the lateral end of the external auditory canal, lack of an identifiable tympanic membrane and atresia or erosion of the manubrium of the malleus and long delmer of the incus with no definite articulation between the incus and stapes.  2.  Labyrinthine structures on the right are normal.  3.  Normal left temporal bone.    All imaging was independently reviewed.       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 Given her extremely small ear canal, I am worried for infections and possible cholesteatoma medially, but cannot assess this fully on today's exam. We discussed that she may need a surgery to open the meatus and ear canal, with potential for more middle ear work. We would like her to first consult with Dr. Hernández, my colleague in facial plastic surgery to discuss an approach for the meatus and potential for staged surgery. The patient's priority seems to be the infection, discomfort, and hearing more than appearance but she has not had a formal consultation for these issues.  We do not anticipate making hearing better in the right ear with surgery and would rather consider it for safety should there be ongoing infection/squamous ingrowth.  I would like to see her again after she meets with Dr. Morgan Elizalde. She knows that she can contact me in the meantime for any new or worsening of symptoms.     Scribe  Disclosure:  I, Radha Noemí, am serving as a scribe to document services personally performed by Karla Light MD at this visit, based upon the provider's statements to me. All documentation has been reviewed by the aforementioned provider prior to being entered into the official medical record.    The documentation recorded by the scribe accurately reflects the services I personally performed and the decisions made by me.    Karla Light MD  Otology & Neurotology  [facility]

## 2021-09-28 NOTE — PROGRESS NOTES
AUDIOLOGY REPORT     SUMMARY: Audiology visit completed. See audiogram for results.       RECOMMENDATIONS: Follow-up with ENT.     Tabitha Monk CCC-A  Licensed Audiologist   [place] #[id number]

## 2021-09-28 NOTE — PATIENT INSTRUCTIONS
1. You were seen in the ENT Clinic today by Dr. Light.  If you have any questions or concerns after your appointment, please call   - Option 1: ENT Clinic: [phone]   - Option 2: [facility] (Dr. Light' Nurse): [phone]                    Kacie(Dr. Light' Nurse): [phone]      2.  Referral to meet with Dr. Edgar Jerome LPN  [facility] - Otolaryngology

## 2021-09-28 NOTE — NURSING NOTE
Chief Complaint   Patient presents with     Consult     congenital      Pulse 95, temperature 98  F (36.7  C), SpO2 99 %, not currently breastfeeding.    Paul Meza LPN

## 2021-09-28 NOTE — TELEPHONE ENCOUNTER
FUTURE VISIT INFORMATION      FUTURE VISIT INFORMATION:    Date: 11/17/21    Time: 2:20 PM    Location: [facility]  REFERRAL INFORMATION:    Referring provider: Dr. Karla Light    Ref[facility] - [facility]    Reason for visit/diagnosis: Surgery Consult to open ear canal    RECORDS REQUESTED FROM:       Clinic name Comments Records Status Imaging Status   [facility] 9/28/21 - ENT OV with Dr. Light [facility] 8/16/21 - ENT OV with Dr. Pardo [facility] 4/28/21 - PCC OV with Dr. Johnson [facility] - imaging ([facility]) 10/10/21 - MRI Brain  7/30/21 - CT Temporal [facility] PACs

## 2021-09-28 NOTE — LETTER
2021       RE: Oli Mercedes  [address] [place] [address]     Dear Colleague,    Thank you for referring your patient, Oli Mercedes, to the [facility] [place] [facility]. Please see a copy of my visit note below.      Neurotology Clinic      Name: Oli Mercedes  MRN: [id number]  Age: 36 year old  : 1985  Referring provider: Dr. Tony Pardo, [facility] ENT  2021      Chief Complaint:   Consultation    History of Present Illness:   Oil Mercedes is a 36 year old female who presents for consultation regarding congenital atresia of the right external ear. Consultation was requested by Dr. Tony Pardo, [facility] E[facility]. The patient was referred to Dr. Pardo from her PCP regarding her congenital atresia of her right ear. At this visit two months ago (2021), the patient reported no serviceable hearing on this side, and she denied any history of surgery on this ear, dizziness, or vertigo. She noted her right ear has always had difficulties with hearing, but reported that her left ear also seemed to be worse recently. The patient also stated that her right ear has been persistently draining, despite cleaning this daily with a variety of implements for many years. Dr. Pardo took a picture of the patient's right ear at a post-CT visit last month (2021) and referred the patient here for further evaluation.    Today, she reports that she cannot hear anymore out of her right side, and endorses intermittent pain on this ear. She denies history of any ear surgeries.    Review of Systems:   Pertinent items are noted in HPI or as in patient entered ROS below, remainder of complete ROS is negative.    ENT ROS 2021   Ears, Nose, Throat Hearing loss      Active Medications:     Current Outpatient Medications:      cetirizine (ZYRTEC) 10 MG tablet, Take 1 tablet (10 mg) by mouth 2 times daily, Disp: 60 tablet, Rfl:  3     naproxen (NAPROSYN) 500 MG tablet, Take 1 tablet (500 mg) by mouth 2 times daily as needed, Disp: 60 tablet, Rfl: 2     omeprazole (PRILOSEC) 40 MG DR capsule, Take 1 capsule (40 mg) by mouth daily, Disp: 90 capsule, Rfl: 3      Allergies:   Patient has no known allergies.      Past Medical History:  No past medical history on file.  Patient Active Problem List   Diagnosis     Urticaria      Past Surgical History:  No past surgical history on file.    Family History:   No family history on file.      Social History:   Social History     Tobacco Use     Smoking status: Never Smoker     Smokeless tobacco: Former User   Substance Use Topics     Alcohol use: Never     Drug use: Never      Physical Exam:   Pulse 95   Temp 98  F (36.7  C)   SpO2 99%      Constitutional:  The patient was accompanied by her daughter, well-groomed, and in no acute distress.     Skin: Normal:  warm and pink without rash   Neurologic: Alert and oriented x 3.  CN's III-XII within normal limits.  Voice normal.    Psychiatric: The patient's affect was calm, cooperative, and appropriate.     Communication:  Normal; communicates verbally, normal voice quality.   Respiratory: Breathing comfortably without stridor or exertion of accessory muscles.    Eyes: Pupils were equal and reactive.  Extraocular movement intact.     Ears: Pinnae and tragus non-tender.  EAC's and TM's were evaluated, results below.        Otologic microscope exam:  Right ear: Microtia, small superior remnant, tag at the level of the lobule, and posterior to that is a small opening. This just admits a #7 suction at the opening. This is dry. Beyond the opening, there is an ear canal.  I can see yeast in the canal laterally.  Medially, there is a pink glistening surface, and I cannot tell if that is post-obstructive membrane, inflammatory tissue, or cholesteatoma.      Left ear: Grade I microtia. There is a through and through hole in the auricle posterior to the triangular  fossa. Ear canal is patent. The TM is intact, but looks like it has either been repaired or remodeled from infections posteriorly.    Audiogram:  AUDIOGRAM: She underwent an audiogram today.     This demonstrated:   Right profound loss - question crossover of bone line given profound thresholds and reduction in word recognition  Left moderate mixed hearing loss        Right: Speech reception threshold is 115 dB with 32% word recognition at 115 dB. DNT Tympanogram  Left: Speech reception threshold is 45 dB with 88% word recognition at 75 dB. Tympanogram A type     Audiogram was independently reviewed.    Imaging:  CT TEMPORAL BONES WO CONTRAST:  (07/30/2021)                                                      IMPRESSION:  1.  Multiple abnormalities of the right ear including: Small soft tissue opening at the lateral end of the external auditory canal, lack of an identifiable tympanic membrane and atresia or erosion of the manubrium of the malleus and long delmer of the incus with no definite articulation between the incus and stapes.  2.  Labyrinthine structures on the right are normal.  3.  Normal left temporal bone.    All imaging was independently reviewed.       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 Given her extremely small ear canal, I am worried for infections and possible cholesteatoma medially, but cannot assess this fully on today's exam. We discussed that she may need a surgery to open the meatus and ear canal, with potential for more middle ear work. We would like her to first consult with Dr. Hernández, my colleague in facial plastic surgery to discuss an approach for the meatus and potential for staged surgery. The patient's priority seems to be the infection, discomfort, and hearing more than appearance but she has not had a formal consultation for these issues.  We do not anticipate making  hearing better in the right ear with surgery and would rather consider it for safety should there be ongoing infection/squamous ingrowth.  I would like to see her again after she meets with Dr. Morgan Elizalde. She knows that she can contact me in the meantime for any new or worsening of symptoms.     Scribe Disclosure:  I, Radha Dowd, am serving as a scribe to document services personally performed by Karla Light MD at this visit, based upon the provider's statements to me. All documentation has been reviewed by the aforementioned provider prior to being entered into the official medical record.    The documentation recorded by the scribe accurately reflects the services I personally performed and the decisions made by me.    Karla Light MD  Otology & Neurotology  [facility]          Again, thank you for allowing me to participate in the care of your patient.      Sincerely,    Karla Light MD

## 2021-10-07 ENCOUNTER — TELEPHONE (OUTPATIENT)
Dept: CARE COORDINATION | Facility: CLINIC | Age: 36
End: 2021-10-07

## 2021-10-07 DIAGNOSIS — Z76.89 ENCOUNTER FOR SUPPORT AND COORDINATION OF TRANSITION OF CARE: Primary | ICD-10-CM

## 2021-10-07 NOTE — TELEPHONE ENCOUNTER
Jose Patel,   Please assist patient schedule transportation for her upcoming appt on 10/10/2021 at 8;30am. Please appt desk.     Thank you,  Rola

## 2021-10-10 ENCOUNTER — ANCILLARY PROCEDURE (OUTPATIENT)
Dept: MRI IMAGING | Facility: CLINIC | Age: 36
End: 2021-10-10
Attending: OTOLARYNGOLOGY
Payer: COMMERCIAL

## 2021-10-10 DIAGNOSIS — Q16.1 CONGENITAL ATRESIA OF RIGHT EXTERNAL EAR: ICD-10-CM

## 2021-10-10 PROCEDURE — A9585 GADOBUTROL INJECTION: HCPCS | Performed by: RADIOLOGY

## 2021-10-10 PROCEDURE — 70553 MRI BRAIN STEM W/O & W/DYE: CPT | Performed by: RADIOLOGY

## 2021-10-10 RX ORDER — GADOBUTROL 604.72 MG/ML
7.5 INJECTION INTRAVENOUS ONCE
Status: COMPLETED | OUTPATIENT
Start: 2021-10-10 | End: 2021-10-10

## 2021-10-10 RX ADMIN — GADOBUTROL 6 ML: 604.72 INJECTION INTRAVENOUS at 10:13

## 2021-10-20 ENCOUNTER — PATIENT OUTREACH (OUTPATIENT)
Dept: CARE COORDINATION | Facility: CLINIC | Age: 36
End: 2021-10-20

## 2021-10-20 ENCOUNTER — PATIENT OUTREACH (OUTPATIENT)
Dept: NURSING | Facility: CLINIC | Age: 36
End: 2021-10-20
Attending: FAMILY MEDICINE

## 2021-10-20 DIAGNOSIS — Z76.89 ENCOUNTER FOR SUPPORT AND COORDINATION OF TRANSITION OF CARE: ICD-10-CM

## 2021-10-20 SDOH — ECONOMIC STABILITY: FOOD INSECURITY: WITHIN THE PAST 12 MONTHS, THE FOOD YOU BOUGHT JUST DIDN'T LAST AND YOU DIDN'T HAVE MONEY TO GET MORE.: NEVER TRUE

## 2021-10-20 SDOH — ECONOMIC STABILITY: TRANSPORTATION INSECURITY
IN THE PAST 12 MONTHS, HAS THE LACK OF TRANSPORTATION KEPT YOU FROM MEDICAL APPOINTMENTS OR FROM GETTING MEDICATIONS?: YES

## 2021-10-20 SDOH — HEALTH STABILITY: PHYSICAL HEALTH: ON AVERAGE, HOW MANY DAYS PER WEEK DO YOU ENGAGE IN MODERATE TO STRENUOUS EXERCISE (LIKE A BRISK WALK)?: 3 DAYS

## 2021-10-20 SDOH — HEALTH STABILITY: PHYSICAL HEALTH: ON AVERAGE, HOW MANY MINUTES DO YOU ENGAGE IN EXERCISE AT THIS LEVEL?: 10 MIN

## 2021-10-20 SDOH — ECONOMIC STABILITY: TRANSPORTATION INSECURITY
IN THE PAST 12 MONTHS, HAS LACK OF TRANSPORTATION KEPT YOU FROM MEETINGS, WORK, OR FROM GETTING THINGS NEEDED FOR DAILY LIVING?: YES

## 2021-10-20 SDOH — ECONOMIC STABILITY: FOOD INSECURITY: WITHIN THE PAST 12 MONTHS, YOU WORRIED THAT YOUR FOOD WOULD RUN OUT BEFORE YOU GOT MONEY TO BUY MORE.: NEVER TRUE

## 2021-10-20 SDOH — ECONOMIC STABILITY: INCOME INSECURITY: IN THE LAST 12 MONTHS, WAS THERE A TIME WHEN YOU WERE NOT ABLE TO PAY THE MORTGAGE OR RENT ON TIME?: NO

## 2021-10-20 ASSESSMENT — SOCIAL DETERMINANTS OF HEALTH (SDOH)
HOW OFTEN DO YOU ATTEND CHURCH OR RELIGIOUS SERVICES?: 1 TO 4 TIMES PER YEAR
IN A TYPICAL WEEK, HOW MANY TIMES DO YOU TALK ON THE PHONE WITH FAMILY, FRIENDS, OR NEIGHBORS?: MORE THAN THREE TIMES A WEEK
WITHIN THE LAST YEAR, HAVE TO BEEN RAPED OR FORCED TO HAVE ANY KIND OF SEXUAL ACTIVITY BY YOUR PARTNER OR EX-PARTNER?: NO
WITHIN THE LAST YEAR, HAVE YOU BEEN AFRAID OF YOUR PARTNER OR EX-PARTNER?: NO
HOW OFTEN DO YOU GET TOGETHER WITH FRIENDS OR RELATIVES?: MORE THAN THREE TIMES A WEEK
WITHIN THE LAST YEAR, HAVE YOU BEEN HUMILIATED OR EMOTIONALLY ABUSED IN OTHER WAYS BY YOUR PARTNER OR EX-PARTNER?: NO
DO YOU BELONG TO ANY CLUBS OR ORGANIZATIONS SUCH AS CHURCH GROUPS UNIONS, FRATERNAL OR ATHLETIC GROUPS, OR SCHOOL GROUPS?: NO
HOW HARD IS IT FOR YOU TO PAY FOR THE VERY BASICS LIKE FOOD, HOUSING, MEDICAL CARE, AND HEATING?: NOT VERY HARD
WITHIN THE LAST YEAR, HAVE YOU BEEN KICKED, HIT, SLAPPED, OR OTHERWISE PHYSICALLY HURT BY YOUR PARTNER OR EX-PARTNER?: NO
HOW OFTEN DO YOU ATTENT MEETINGS OF THE CLUB OR ORGANIZATION YOU BELONG TO?: NEVER

## 2021-10-20 ASSESSMENT — LIFESTYLE VARIABLES
HOW OFTEN DO YOU HAVE SIX OR MORE DRINKS ON ONE OCCASION: NEVER
HOW OFTEN DO YOU HAVE A DRINK CONTAINING ALCOHOL: NEVER

## 2021-10-20 ASSESSMENT — ACTIVITIES OF DAILY LIVING (ADL): DEPENDENT_IADLS:: INDEPENDENT

## 2021-10-20 NOTE — LETTER
[facility]  Patient Centered Plan of Care  About Me:        Patient Name:  Oli Mercedes    YOB: 1985  Age:         36 year old   Kieran MRN:    [id number] Telephone Information:  Home Phone [phone]   Mobile [phone]       Address:  [address] [place] [address] Email address:  No e-mail address on record      Emergency Contact(s)    Name Relationship Lgl Grd Work Phone Home Phone Mobile Phone   1. BRANDON LAGOS KNAE Uncle    [phone]   2. ROSEY,SAW SOFÍA Spouse    [phone]           Primary language:  Zainab     needed? Yes   [facility] [facility]:  [phone] op. 1  Other communication barriers:Language barrier    Preferred Method of Communication:     Current living arrangement: I live in a private home with family    Mobility Status/ Medical Equipment: Independent        Health Maintenance  Health Maintenance Reviewed: No data recorded    My Access Plan  Medical Emergency 911   Primary Clinic Line [facility] [phone]   24 Hour Appointment Line [phone] or  [phone] ([phone]) (toll-free)   24 Hour Nurse Line [phone] (toll-free)   Preferred Urgent Care [facility] [phone]     Preferred Hospital [facility]  [phone]     Preferred Pharmacy [id number] - [place], [place] - [address]     Behavioral Health Crisis Line The National Suicide Prevention Lifeline at 1-748.185.7455 or 911             My Care Team Members  Patient Care Team       Relationship Specialty Notifications Start End    Prashanth Johnson MD PCP - General Family Practice  4/28/21     Phone: [phone] Fax: [phone]         [address] [facility] [facility] [address] [place] [address]    Prashanth Johnson MD Assigned PCP   6/16/21     Phone: [phone] Fax: [phone]         [address] [facility] [address] [place] [address]    Tony Pardo MD Assigned Surgical  Provider   7/25/21     Phone: [phone] Fax: [phone] [address] [place] [address]    Rola Johnston, RN Lead Care Coordinator Primary Care - CC Admissions 10/20/21             My Care Plans  Self Management and Treatment Plan  Goals and (Comments)  Goals        General     Psychosocial (pt-stated)      Notes - Note edited  10/20/2021 10:46 AM by Rola Johnston, RN     Goal statement: I will establish ARM services within the next 90 days.    Date goal set: 10/20/2021  Barriers: language barrier, lack of knowledge/support  Strengths: Willing to accept support  Date to achieve by: 1/202022  Patient expressed understanding of goal: Yes     Action steps to achieve this goal:   1.  I will answer my phone when I am contacted by [facility]  to schedule an initial diagnostic assessment.   2.  I will attend my [facility] appointments so that I may be assigned an [facility] worker.  3.  I will follow up with [facility] in the next month regarding this goal.     Note: Referral to [facility] placed on 10/20/2021 verbally.     [facility] - [phone]             Action Plans on File:                       Advance Care Plans/Directives Type:   No data recorded    My Medical and Care Information  Problem List   Patient Active Problem List   Diagnosis     Urticaria      Current Medications and Allergies:  See printed Medication Report.    Care Coordination Start Date: 10/20/2021   Frequency of Care Coordination: 6 weeks     Form Last Updated: 10/20/2021

## 2021-10-20 NOTE — PROGRESS NOTES
Clinic Care Coordination Contact    Clinic Care Coordination Contact  OUTREACH    Referral Information:  Referral Source: Care Team    Primary Diagnosis: Psychosocial    Chief Complaint   Patient presents with     Clinic Care Coordination - Initial     Clinic Utilization  Difficulty keeping appointments:: No  Compliance Concerns: No  No-Show Concerns: No  No PCP office visit in Past Year: No  Utilization    Hospital Admissions  0             ED Visits  0             No Show Count (past year)  5                Current as of: 10/17/2021  3:20 AM              Clinical Concerns:  CC RN assessment completed today with patient via phone. Lives single family home with spouse and 3 children. States she needs assist only for energy assistance program and transportation. States she can't always able to reach [facility]  so she would like other options to assist her. States her  can drive [facility] to [facility] but [facility]. Denies abuse or neglect.   - SNAP - $500/month  - rent - $985/month - no rental assistance.    1) Energy assistance program  - spouse works full time at a [employer] - $15/hr   - Family of 5.  - Patient agrees to be referral to [employer] for [facility] service. CC RN and patient called and [facility] worker with [employer] today.  Hser will reach out to patient in the next 7 days.   -  Hser states she can help with energy assistance application.   - Instructed patient to tell her [employer] worker with her needs such as transportation.   Pain  Pain (GOAL):: No  Health Maintenance Reviewed:    Clinical Pathway: None    Medication Management:  Medication review status: Medications reviewed and no changes reported per patient.             Functional Status:  Dependent ADLs:: Independent  Dependent IADLs:: Independent  Bed or wheelchair confined:: No  Mobility Status: Independent  Fallen 2 or more times in the past year?: No  Any fall with injury in the past  year?: No    Living Situation:  Current living arrangement:: I live in a private home with family  Type of residence:: Apartment    Lifestyle & Psychosocial Needs:    Social Determinants of Health     Tobacco Use: Medium Risk     Smoking Tobacco Use: Never Smoker     Smokeless Tobacco Use: Former User   Alcohol Use: Not At Risk     Frequency of Alcohol Consumption: Never     Average Number of Drinks: Not on file     Frequency of Binge Drinking: Never   Financial Resource Strain: Low Risk      Difficulty of Paying Living Expenses: Not very hard   Food Insecurity: No Food Insecurity     Worried About Running Out of Food in the Last Year: Never true     Ran Out of Food in the Last Year: Never true   Transportation Needs: Unmet Transportation Needs     Lack of Transportation (Medical): Yes     Lack of Transportation (Non-Medical): Yes   Physical Activity: Insufficiently Active     Days of Exercise per Week: 3 days     Minutes of Exercise per Session: 10 min   Stress: No Stress Concern Present     Feeling of Stress : Only a little   Social Connections: Moderately Integrated     Frequency of Communication with Friends and Family: More than three times a week     Frequency of Social Gatherings with Friends and Family: More than three times a week     Attends Quaker Services: 1 to 4 times per year     Active Member of Clubs or Organizations: No     Attends Club or Organization Meetings: Never     Marital Status:    Intimate Partner Violence: Not At Risk     Fear of Current or Ex-Partner: No     Emotionally Abused: No     Physically Abused: No     Sexually Abused: No   Depression: Not at risk     PHQ-2 Score: 0   Housing Stability: Low Risk      Unable to Pay for Housing in the Last Year: No     Number of Places Lived in the Last Year: 1     Unstable Housing in the Last Year: No     Diet:: Regular  Inadequate nutrition (GOAL):: No  Inadequate activity/exercise (GOAL):: No  Significant changes in sleep pattern  (GOAL): No  Transportation means:: Medical transport, Family, Regular car     Mormonism or spiritual beliefs that impact treatment:: No  Mental health management concern (GOAL):: No  Chemical Dependency Status: No Current Concerns  Informal Support system:: Children, Bhavya based, Family, Friends, Spouse      Resources and Interventions:  Current Resources:      Community Resources: County Programs, County Worker  Supplies used at home:: None  Equipment Currently Used at Home: none  Employment Status: homemaker     Advance Care Plan/Directive  Advanced Care Plans/Directives on file:: No  Advanced Care Plan/Directive Status: Declined Further Information    Referrals Placed: None     Goals:   Goals        Psychosocial (pt-stated)       Goal statement: I will establish ARM services within the next 90 days.    Date goal set: 10/20/2021  Barriers: language barrier, lack of knowledge/support  Strengths: Willing to accept support  Date to achieve by: 1/202022  Patient expressed understanding of goal: Yes     Action steps to achieve this goal:   1.  I will answer my phone when I am contacted by [facility]  to schedule an initial diagnostic assessment.   2.  I will attend my [facility] appointments so that I may be assigned an ARMHS worker.  3.  I will follow up with CCC in the next month regarding this goal.     Note: Referral to [facility] placed on 10/20/2021 verbally.     [facility] - [phone]              Outreach Frequency: 6 weeks  Future Appointments              In 3 weeks Prashanth Johnson MD [facility]    In 4 weeks Lin Hernández MD [facility]          Plan:   1) Patient will talk to [facility] when she's contacted to start the arm service.   2) CC RN will follow up in 6 weeks or sooner if needed.

## 2021-10-20 NOTE — LETTER
[facility] CARE COORDINATION    October 15, 2021    Say Daren  [address] [place] [address]      Dear Yanira,    I am a clinic care coordinator who works with Lucien Fabian MD. I wanted to thank you for spending the time to talk with me.  Below is a description of clinic care coordination and how I can further assist you.      The clinic care coordination team is made up of a registered nurse,  and community health worker who understand the health care system. The goal of clinic care coordination is to help you manage your health and improve access to the health care system in the most efficient manner. The team can assist you in meeting your health care goals by providing education, coordinating services, strengthening the communication among your providers and supporting you with any resource needs.    Please feel free to contact me and the Community Health Worker at [phone] with any questions or concerns. We are focused on providing you with the highest-quality healthcare experience possible and that all starts with you.     Sincerely,     Rola Johnston RN    Enclosed: I have enclosed a copy of the Patient Centered Plan of Care. This has helpful information and goals that we have talked about. Please keep this in an easy to access place to use as needed.

## 2021-10-20 NOTE — PROGRESS NOTES
Clinic Care Coordination    Patient is newly enrolled in Clinic Care Coordination.      CHW Assigned: Linh Chen      CHW Next Follow-up: 11/8/2021    CHW Plan: Will follow up with Yoel Amor at [phone] from [employer] to confirm if she has been able to reach Say Daren regarding [facility] establishment.

## 2021-11-08 ENCOUNTER — PATIENT OUTREACH (OUTPATIENT)
Dept: NURSING | Facility: CLINIC | Age: 36
End: 2021-11-08
Payer: COMMERCIAL

## 2021-11-08 NOTE — PROGRESS NOTES
Clinic Care Coordination Contact  Community Health Worker Follow Up      Care Gaps:     Advanced Care Planning - Declined        Goals:     Goals Addressed                    This Visit's Progress       Psychosocial (pt-stated)   40%      Goal statement: I will establish ARMHS services within the next 90 days.    Date goal set: 10/20/2021  Barriers: language barrier, lack of knowledge/support  Strengths: Willing to accept support  Date to achieve by: 1/202022  Patient expressed understanding of goal: Yes     Action steps to achieve this goal:   1.  I am in the process of having my Diagnostic Assessment completed through [facility].  2.  I will attend my [facility] appointments so that I may be assigned an ARMHS worker.  3.  I will follow up with CCC in the next month regarding this goal.     Note: Referral to [facility] placed on 10/20/2021 verbally.  is Yoel Friedman, [phone].              Intervention and Education during outreach:     Oli Mercedes states she is working with [facility] but could not state who she has spoken to and where she is at in the process of being assigned an ARMHS Worker.    CHW spoke with Yoel Friedman from [facility] and she reports that they are in the process of completing the Diagnostic Assessment and they are also working closely with Oli Mercedes to offer her support while she is in the process of being assigned an ARMHS Worker.    CHW did provide a reminder about this week's PCP appointment that is scheduled for Thursday 11/11/2021 at 5:20pm.      CHW Next Follow-up: 1 month    CHW Plan: Review the chart and outreach to the patient at the next outreach.

## 2021-11-11 ENCOUNTER — OFFICE VISIT (OUTPATIENT)
Dept: FAMILY MEDICINE | Facility: CLINIC | Age: 36
End: 2021-11-11
Payer: COMMERCIAL

## 2021-11-11 VITALS
OXYGEN SATURATION: 97 % | SYSTOLIC BLOOD PRESSURE: 116 MMHG | BODY MASS INDEX: 25.6 KG/M2 | HEART RATE: 88 BPM | WEIGHT: 127 LBS | HEIGHT: 59 IN | TEMPERATURE: 97.8 F | DIASTOLIC BLOOD PRESSURE: 86 MMHG

## 2021-11-11 DIAGNOSIS — H90.6 MIXED HEARING LOSS, BILATERAL: ICD-10-CM

## 2021-11-11 DIAGNOSIS — L50.8 CHRONIC URTICARIA: ICD-10-CM

## 2021-11-11 DIAGNOSIS — R35.0 URINARY FREQUENCY: ICD-10-CM

## 2021-11-11 DIAGNOSIS — Q16.1 CONGENITAL ATRESIA OF RIGHT EXTERNAL EAR: Primary | ICD-10-CM

## 2021-11-11 DIAGNOSIS — R09.82 POSTNASAL DISCHARGE: ICD-10-CM

## 2021-11-11 DIAGNOSIS — L50.9 URTICARIA: ICD-10-CM

## 2021-11-11 DIAGNOSIS — K21.9 GASTROESOPHAGEAL REFLUX DISEASE WITHOUT ESOPHAGITIS: ICD-10-CM

## 2021-11-11 DIAGNOSIS — R19.4 FREQUENT BOWEL MOVEMENTS: ICD-10-CM

## 2021-11-11 DIAGNOSIS — M79.672 LEFT FOOT PAIN: ICD-10-CM

## 2021-11-11 LAB
ALBUMIN UR-MCNC: NEGATIVE MG/DL
APPEARANCE UR: CLEAR
BACTERIA #/AREA URNS HPF: ABNORMAL /HPF
BILIRUB UR QL STRIP: NEGATIVE
COLOR UR AUTO: YELLOW
GLUCOSE UR STRIP-MCNC: NEGATIVE MG/DL
HGB UR QL STRIP: NEGATIVE
KETONES UR STRIP-MCNC: NEGATIVE MG/DL
LEUKOCYTE ESTERASE UR QL STRIP: ABNORMAL
MUCOUS THREADS #/AREA URNS LPF: PRESENT /LPF
NITRATE UR QL: NEGATIVE
PH UR STRIP: 6.5 [PH] (ref 5–7)
RBC #/AREA URNS AUTO: ABNORMAL /HPF
SP GR UR STRIP: 1.02 (ref 1–1.03)
SQUAMOUS #/AREA URNS AUTO: ABNORMAL /LPF
UROBILINOGEN UR STRIP-ACNC: 0.2 E.U./DL
WBC #/AREA URNS AUTO: ABNORMAL /HPF
WBC CLUMPS #/AREA URNS HPF: PRESENT /HPF

## 2021-11-11 PROCEDURE — 81001 URINALYSIS AUTO W/SCOPE: CPT | Performed by: FAMILY MEDICINE

## 2021-11-11 PROCEDURE — 99214 OFFICE O/P EST MOD 30 MIN: CPT | Performed by: FAMILY MEDICINE

## 2021-11-11 RX ORDER — CETIRIZINE HYDROCHLORIDE 10 MG/1
10 TABLET ORAL 2 TIMES DAILY
Qty: 60 TABLET | Refills: 3 | Status: SHIPPED | OUTPATIENT
Start: 2021-11-11 | End: 2021-11-15

## 2021-11-11 RX ORDER — LOPERAMIDE HYDROCHLORIDE 2 MG/1
2 TABLET ORAL 3 TIMES DAILY PRN
Qty: 90 TABLET | Refills: 3 | Status: SHIPPED | OUTPATIENT
Start: 2021-11-11 | End: 2022-10-26

## 2021-11-11 RX ORDER — OMEPRAZOLE 40 MG/1
40 CAPSULE, DELAYED RELEASE ORAL DAILY
Qty: 90 CAPSULE | Refills: 3 | Status: SHIPPED | OUTPATIENT
Start: 2021-11-11 | End: 2022-10-26

## 2021-11-11 ASSESSMENT — MIFFLIN-ST. JEOR: SCORE: 1170.66

## 2021-11-15 ENCOUNTER — TELEPHONE (OUTPATIENT)
Dept: FAMILY MEDICINE | Facility: CLINIC | Age: 36
End: 2021-11-15
Payer: COMMERCIAL

## 2021-11-15 DIAGNOSIS — L50.9 URTICARIA: ICD-10-CM

## 2021-11-15 DIAGNOSIS — L50.8 CHRONIC URTICARIA: ICD-10-CM

## 2021-11-15 DIAGNOSIS — R09.82 POSTNASAL DISCHARGE: ICD-10-CM

## 2021-11-15 RX ORDER — CETIRIZINE HYDROCHLORIDE 10 MG/1
10 TABLET ORAL DAILY
Qty: 60 TABLET | Refills: 3 | Status: SHIPPED | OUTPATIENT
Start: 2021-11-15 | End: 2022-10-26

## 2021-11-15 NOTE — CONFIDENTIAL NOTE
Changed prescription to once daily.     Will follow up with patient to see if that is sufficient.     Prashanth Johnson MD

## 2021-11-15 NOTE — TELEPHONE ENCOUNTER
Prior Authorization Retail Medication Request    Medication/Dose: Cetririzne 10 mg tab one tab by mouth t wice daily     ICD code (if different than what is on RX):  same  Previously Tried and Failed:  unkown  Rationale:  urticara     Insurance Name:  Pamela  Insurance ID:  [id number]    Does PCP wish to change med or proceed with PA?        Pharmacy Information (if different than what is on RX)  Name:  Same  Phone:  Same    Call taken on 11/15/21 at 3 pm by Jayashree Velásquez CMA, CMT

## 2021-11-17 ENCOUNTER — PRE VISIT (OUTPATIENT)
Dept: OTOLARYNGOLOGY | Facility: CLINIC | Age: 36
End: 2021-11-17

## 2021-11-17 ENCOUNTER — OFFICE VISIT (OUTPATIENT)
Dept: OTOLARYNGOLOGY | Facility: CLINIC | Age: 36
End: 2021-11-17
Payer: COMMERCIAL

## 2021-11-17 VITALS — BODY MASS INDEX: 21.6 KG/M2 | HEIGHT: 60 IN | WEIGHT: 110 LBS

## 2021-11-17 DIAGNOSIS — Q16.1 CONGENITAL ATRESIA OF RIGHT EXTERNAL EAR: Primary | ICD-10-CM

## 2021-11-17 DIAGNOSIS — Q17.2 MICROTIA OF RIGHT EAR: ICD-10-CM

## 2021-11-17 PROCEDURE — 99204 OFFICE O/P NEW MOD 45 MIN: CPT | Performed by: OTOLARYNGOLOGY

## 2021-11-17 ASSESSMENT — PAIN SCALES - GENERAL: PAINLEVEL: NO PAIN (0)

## 2021-11-17 ASSESSMENT — MIFFLIN-ST. JEOR: SCORE: 1105.46

## 2021-11-17 NOTE — PROGRESS NOTES
Facial Plastic and Reconstructive Surgery Consultation    Referring Provider:  Karla Light MD     HPI:   I had the pleasure of seeing Oli Mercedes today in clinic for consultation for right congenital ear deformity. 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 She was evaluated by Dr. Light who recommended she consult me for consideration of opening the right ear canal and external ear.     The patient primarily complains of itching in the right ear and discomfort from it. She does not have hearing on that side. A CT scan was reviewed that showed the beyond the narrow opening, she had an intact canal. She denies history of any ear surgeries.    She is here with a friend who serves as her .     Review Of Systems  ROS: 10 point ROS neg other than the symptoms noted above in the HPI.    Patient Active Problem List   Diagnosis     Urticaria     Congenital atresia of right external ear     Mixed hearing loss, bilateral     No past surgical history on file.  Current Outpatient Medications   Medication Sig Dispense Refill     cetirizine (ZYRTEC) 10 MG tablet Take 1 tablet (10 mg) by mouth daily 60 tablet 3     loperamide (IMODIUM A-D) 2 MG tablet Take 1 tablet (2 mg) by mouth 3 times daily as needed for diarrhea 90 tablet 3     naproxen (NAPROSYN) 500 MG tablet Take 1 tablet (500 mg) by mouth 2 times daily as needed 60 tablet 2     omeprazole (PRILOSEC) 40 MG DR capsule Take 1 capsule (40 mg) by mouth daily 90 capsule 3     Patient has no known allergies.  Social History     Socioeconomic History     Marital status:      Spouse name: Not on file     Number of children: 3     Years of education: 0     Highest education level: Never attended school   Occupational History     Not on file   Tobacco Use     Smoking status: Never Smoker     Smokeless tobacco: Former User   Vaping Use     Vaping Use: Never used   Substance and Sexual Activity     Alcohol use:  "Never     Drug use: Never     Sexual activity: Yes     Partners: Male   Other Topics Concern     Not on file   Social History Narrative    Her children are ages 9, 8, and 3 (as of 2020).      Social Determinants of Health     Financial Resource Strain: Low Risk      Difficulty of Paying Living Expenses: Not very hard   Food Insecurity: No Food Insecurity     Worried About Running Out of Food in the Last Year: Never true     Ran Out of Food in the Last Year: Never true   Transportation Needs: Unmet Transportation Needs     Lack of Transportation (Medical): Yes     Lack of Transportation (Non-Medical): Yes   Physical Activity: Insufficiently Active     Days of Exercise per Week: 3 days     Minutes of Exercise per Session: 10 min   Stress: No Stress Concern Present     Feeling of Stress : Only a little   Social Connections: Moderately Integrated     Frequency of Communication with Friends and Family: More than three times a week     Frequency of Social Gatherings with Friends and Family: More than three times a week     Attends Rastafari Services: 1 to 4 times per year     Active Member of Clubs or Organizations: No     Attends Club or Organization Meetings: Never     Marital Status:    Intimate Partner Violence: Not At Risk     Fear of Current or Ex-Partner: No     Emotionally Abused: No     Physically Abused: No     Sexually Abused: No   Housing Stability: Low Risk      Unable to Pay for Housing in the Last Year: No     Number of Places Lived in the Last Year: 1     Unstable Housing in the Last Year: No     No family history on file.    PE:  Alert and Oriented, Answering Questions Appropriately  Atraumatic, Normocephalic, Face Symmetric  Skin: Dash 5  Facial Nerve Intact and facial movement symmetric  EOM's, PEERL  Ears\"; Left benign exam  Right ear: Microtia, small superior remnant, tag at the level of the lobule, when the superior segment is elevated there is opening that is approx 2-3 mm in diameter, " with the microscope the area is examined and there is noted ear canal with hair bearing skin, view is limited due to the small size of the canal opening.  Nasal Exam: No external Deformity, no mucopurulence or polyps, no masses  Neck: No lymphadenopathy, no thyromegaly, trachea midline  Chest: No wheezing, cyanosis, or stridor  Card: Normal upper extremity pulses and capillary refill, not diaphoretic  Neuro/Psych: CN's 2-12 intact except for right hearing loss, no notable facial nerve weakness on the right, Moves all extremities, ambulation in intact, positive affect, no notable muscle weakness                        Imaging: CT temporal bone reviewed, patent ear canal beyond the far lateral constriction      IMPRESSION:  Congenital microtia and Aural atresia      PLAN:    Oli is quite symptomatic in the EAC with itching and discomfort. This is her primary concern at this point. With the narrow opening to the intact canal, and one that Oli would not be able to access herself, it is challenging to be able to treat her symptoms. The CT scan and exam demonstrate intact epithelialized canal medially. Thus, I do think it would be optimal to perform a meatoplasty to optimize ear canal health.     She is not interested in external ear reconstruction at this point, but would consider it secondarily.   I will reach out to Dr. Light with my thoughts.     Photodocumentation was obtained.     I spent a total of 45 minutes in the care of patient Oli Mercedes during today's office visit. This time includes reviewing the patient's chart and prior history, obtaining a history, performing an examination and evaluation and counseling the patient. This time also includes ordering mediations or tests necessary in addition to communication to other member's of the patient's health care team. Time spent in documentation and care coordination is included.

## 2021-11-17 NOTE — LETTER
11/17/2021       RE: Oli Mercedes  [address] [place] [address]     Dear Colleague,    Thank you for referring your patient, Say Daren, to the [facility] E[facility]SE AND THROAT CLINIC [place] [facility]. Please see a copy of my visit note below.    Facial Plastic and Reconstructive Surgery Consultation    Referring Provider:  Karla Light MD     HPI:   I had the pleasure of seeing Oli Mercedes today in clinic for consultation for right congenital ear deformity. 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 She was evaluated by Dr. Light who recommended she consult me for consideration of opening the right ear canal and external ear.     The patient primarily complains of itching in the right ear and discomfort from it. She does not have hearing on that side. A CT scan was reviewed that showed the beyond the narrow opening, she had an intact canal. She denies history of any ear surgeries.    She is here with a friend who serves as her .     Review Of Systems  ROS: 10 point ROS neg other than the symptoms noted above in the HPI.    Patient Active Problem List   Diagnosis     Urticaria     Congenital atresia of right external ear     Mixed hearing loss, bilateral     No past surgical history on file.  Current Outpatient Medications   Medication Sig Dispense Refill     cetirizine (ZYRTEC) 10 MG tablet Take 1 tablet (10 mg) by mouth daily 60 tablet 3     loperamide (IMODIUM A-D) 2 MG tablet Take 1 tablet (2 mg) by mouth 3 times daily as needed for diarrhea 90 tablet 3     naproxen (NAPROSYN) 500 MG tablet Take 1 tablet (500 mg) by mouth 2 times daily as needed 60 tablet 2     omeprazole (PRILOSEC) 40 MG DR capsule Take 1 capsule (40 mg) by mouth daily 90 capsule 3     Patient has no known allergies.  Social History     Socioeconomic History     Marital status:      Spouse name: Not on file      Number of children: 3     Years of education: 0     Highest education level: Never attended school   Occupational History     Not on file   Tobacco Use     Smoking status: Never Smoker     Smokeless tobacco: Former User   Vaping Use     Vaping Use: Never used   Substance and Sexual Activity     Alcohol use: Never     Drug use: Never     Sexual activity: Yes     Partners: Male   Other Topics Concern     Not on file   Social History Narrative    Her children are ages 9, 8, and 3 (as of 2020).      Social Determinants of Health     Financial Resource Strain: Low Risk      Difficulty of Paying Living Expenses: Not very hard   Food Insecurity: No Food Insecurity     Worried About Running Out of Food in the Last Year: Never true     Ran Out of Food in the Last Year: Never true   Transportation Needs: Unmet Transportation Needs     Lack of Transportation (Medical): Yes     Lack of Transportation (Non-Medical): Yes   Physical Activity: Insufficiently Active     Days of Exercise per Week: 3 days     Minutes of Exercise per Session: 10 min   Stress: No Stress Concern Present     Feeling of Stress : Only a little   Social Connections: Moderately Integrated     Frequency of Communication with Friends and Family: More than three times a week     Frequency of Social Gatherings with Friends and Family: More than three times a week     Attends Faith Services: 1 to 4 times per year     Active Member of Clubs or Organizations: No     Attends Club or Organization Meetings: Never     Marital Status:    Intimate Partner Violence: Not At Risk     Fear of Current or Ex-Partner: No     Emotionally Abused: No     Physically Abused: No     Sexually Abused: No   Housing Stability: Low Risk      Unable to Pay for Housing in the Last Year: No     Number of Places Lived in the Last Year: 1     Unstable Housing in the Last Year: No     No family history on file.    PE:  Alert and Oriented, Answering Questions Appropriately  Atraumatic,  "Normocephalic, Face Symmetric  Skin: Dash 5  Facial Nerve Intact and facial movement symmetric  EOM's, PEERL  Ears\"; Left benign exam  Right ear: Microtia, small superior remnant, tag at the level of the lobule, when the superior segment is elevated there is opening that is approx 2-3 mm in diameter, with the microscope the area is examined and there is noted ear canal with hair bearing skin, view is limited due to the small size of the canal opening.  Nasal Exam: No external Deformity, no mucopurulence or polyps, no masses  Neck: No lymphadenopathy, no thyromegaly, trachea midline  Chest: No wheezing, cyanosis, or stridor  Card: Normal upper extremity pulses and capillary refill, not diaphoretic  Neuro/Psych: CN's 2-12 intact except for right hearing loss, no notable facial nerve weakness on the right, Moves all extremities, ambulation in intact, positive affect, no notable muscle weakness                        Imaging: CT temporal bone reviewed, patent ear canal beyond the far lateral constriction      IMPRESSION:  Congenital microtia and Aural atresia      PLAN:    Oli is quite symptomatic in the EAC with itching and discomfort. This is her primary concern at this point. With the narrow opening to the intact canal, and one that Oli would not be able to access herself, it is challenging to be able to treat her symptoms. The CT scan and exam demonstrate intact epithelialized canal medially. Thus, I do think it would be optimal to perform a meatoplasty to optimize ear canal health.     She is not interested in external ear reconstruction at this point, but would consider it secondarily.   I will reach out to Dr. Light with my thoughts.     Photodocumentation was obtained.     I spent a total of 45 minutes in the care of patient Oli Mercedes during today's office visit. This time includes reviewing the patient's chart and prior history, obtaining a history, performing an examination and evaluation and counseling " the patient. This time also includes ordering mediations or tests necessary in addition to communication to other member's of the patient's health care team. Time spent in documentation and care coordination is included.         ou for allowing me to participate in the care of your patient.      Sincerely,    Lin Hernández MD

## 2021-11-17 NOTE — NURSING NOTE
Chief Complaint   Patient presents with     Consult     referred by Dr. Light       Height 1.524 m (5'), weight 49.9 kg (110 lb), last menstrual period 11/09/2021, not currently breastfeeding.    Juana Echols, EMT

## 2021-11-18 PROBLEM — Q17.2 MICROTIA OF RIGHT EAR: Status: ACTIVE | Noted: 2021-11-18

## 2021-12-01 ENCOUNTER — PATIENT OUTREACH (OUTPATIENT)
Dept: CARE COORDINATION | Facility: CLINIC | Age: 36
End: 2021-12-01
Payer: COMMERCIAL

## 2021-12-01 NOTE — LETTER
[facility] CARE COORDINATION    December 6, 2021    Say Daren [address] [place] [address]    Dear Say,  Your Care Team congratulates you on your journey to maintain wellness. This document will help guide you on your journey to maintain a healthy lifestyle.  You can use this to help you overcome any barriers you may encounter.  If you should have any questions or concerns, you can contact the members of your Care Team or contact your Primary Care Clinic for assistance.     Health Maintenance  Health Maintenance Reviewed:      My Access Plan  Medical Emergency 911   Primary Clinic Line [facility] - [phone]   24 Hour Appointment Line [phone] or  [phone]) (toll-free)   24 Hour Nurse Line [phone] (toll-free)   Preferred Urgent Care     Preferred Hospital     Preferred Pharmacy [id number] - [place], [place] - [address]     Behavioral Health Crisis Line The National Suicide Prevention Lifeline at 1-857.270.3353 or 911     My Care Team Members  Patient Care Team       Relationship Specialty Notifications Start End    Prashanth Johnson MD PCP - General Family Practice  4/28/21     Phone: [phone] Fax: [phone]         [address] [place] [address]    Prashanth Johnson MD Assigned PCP   6/16/21     Phone: [phone] Fax: [phone]         [address] [place] [address]    Tony Pardo MD Assigned Surgical Provider   7/25/21     Phone: [phone] Fax: [phone]         [address] [place] [address] [address] [place] [address]    Rola Johnston, RN Lead Care Coordinator Primary Care - CC Admissions 10/20/21     Linh Chen Community Health Worker Primary Care - CC Admissions 10/20/21     Heavenly Valdez ARM worker   12/6/21     [facility]    Phone: [phone]                   Goals        COMPLETED: Psychosocial (pt-stated)       Goal statement: I will establish ARM services within  the next 90 days.    Date goal set: 10/20/2021  Barriers: language barrier, lack of knowledge/support  Strengths: Willing to accept support  Date to achieve by: 1/202022  Patient expressed understanding of goal: Yes     Personal Plan:   1.  I am now working with [facility] for supportive services and the primary person who is helping me right now is Heavenly Valdez at [phone] and I will contact Heavenly when I have questions or concerns related to my financial concerns, resources, and other psychosocial concerns.             Advance Care Plans/Directives Type:          It has been your Clinic Care Team's pleasure to work with you on your goals.    Regards,  Your Clinic Care Team

## 2021-12-01 NOTE — PROGRESS NOTES
Clinic Care Coordination Contact     Situation: Patient chart reviewed by care coordinator.     Background: Pts initial assessment and enrollment to Care Coordination was 10/20/2021.   Patient centered goals were developed with participation from patient.  CC handed patient off to CHW for continued outreach every 30 days.      Assessment: CHW has been in contact with patient monthly. Patient has made progress to goals. Per chart review, patient was referral to [facility] for ARMHS service by CHW on 10/20/2021, pending.     Goal:   Goals        Psychosocial (pt-stated)       Goal statement: I will establish ARMHS services within the next 90 days.    Date goal set: 10/20/2021  Barriers: language barrier, lack of knowledge/support  Strengths: Willing to accept support  Date to achieve by: 1/202022  Patient expressed understanding of goal: Yes     Action steps to achieve this goal:   1.  I am in the process of having my Diagnostic Assessment completed through [employer].  2.  I will attend my [facility] appointments so that I may be assigned an ARMHS worker.  3.  I will follow up with CCC in the next month regarding this goal.     Note: Referral to [facility] placed on 10/20/2021 verbally.  is [facility], [phone].               Plan/Recommendations: CHW will involve CC as needed or if patient is ready to move to maintenance.  CC will continue to monitor progress to goals and CHW outreaches every 4 weeks.   CC RN will follow up in 6 weeks or sooner if needed.      Care Plan updated and mailed to patient: no

## 2021-12-06 ENCOUNTER — PATIENT OUTREACH (OUTPATIENT)
Dept: NURSING | Facility: CLINIC | Age: 36
End: 2021-12-06
Payer: COMMERCIAL

## 2021-12-06 NOTE — PROGRESS NOTES
Patient completed all goals as of 12/62021 and okay to be graduated from [facility]. Patient is now established with [facility] worker through [facility] and armhs worker will notify PCP with new concerns.

## 2021-12-06 NOTE — PROGRESS NOTES
Clinic Care Coordination Contact  Community Health Worker Follow Up      Care Gaps:     Advanced Care Planning - Declined      Goals:     Goals Addressed                    This Visit's Progress       COMPLETED: Psychosocial (pt-stated)   100%      Goal statement: I will establish ARMHS services within the next 90 days.    Date goal set: 10/20/2021  Barriers: language barrier, lack of knowledge/support  Strengths: Willing to accept support  Date to achieve by: 1/202022  Patient expressed understanding of goal: Yes     Personal Plan:   1.  I am now working with [facility] for supportive services and the primary person who is helping me right now is Heavenly Valdez at [phone] and I will contact Heavenly when I have questions or concerns related to my financial concerns, resources, and other psychosocial concerns.              Intervention and Education during outreach:     Oli Mercedes reports that she has received calls and visits from [facility] regarding ARMJHS services. She states that she is not sure on who the ARMHS Worker is exactly and was not able to provide their name and phone number. The CHW did send a secure email to Marnie Ocampo from [facility] requesting an update on who Oli Mercedes's ARMHS Worker will be or who the best  is.    Oli Mercedes has no new concerns or questions.    Update: CHW was able to confirm that Oli Mercedes is connected with [facility] for services, and they are working with her insurance so that ARMHS Services can be paid for. Current [facility] provider who works with Oli Mercedes is Heavenly Valdez at [phone].      CHW Next Follow-up: 2 months unless immed graduated from [facility].    CHW Plan: Routing to the [facility] RN to review for immediate graduation or Maintenance.

## 2021-12-13 ENCOUNTER — OFFICE VISIT (OUTPATIENT)
Dept: FAMILY MEDICINE | Facility: CLINIC | Age: 36
End: 2021-12-13
Payer: COMMERCIAL

## 2021-12-13 ENCOUNTER — TELEPHONE (OUTPATIENT)
Dept: OTOLARYNGOLOGY | Facility: CLINIC | Age: 36
End: 2021-12-13

## 2021-12-13 VITALS
SYSTOLIC BLOOD PRESSURE: 116 MMHG | TEMPERATURE: 98.2 F | BODY MASS INDEX: 24.98 KG/M2 | RESPIRATION RATE: 16 BRPM | HEIGHT: 60 IN | HEART RATE: 92 BPM | OXYGEN SATURATION: 92 % | WEIGHT: 127.25 LBS | DIASTOLIC BLOOD PRESSURE: 78 MMHG

## 2021-12-13 DIAGNOSIS — L29.9 ITCHING OF EAR: ICD-10-CM

## 2021-12-13 DIAGNOSIS — Z75.8 LANGUAGE BARRIER AFFECTING HEALTH CARE: ICD-10-CM

## 2021-12-13 DIAGNOSIS — Z60.3 LANGUAGE BARRIER AFFECTING HEALTH CARE: ICD-10-CM

## 2021-12-13 DIAGNOSIS — Q16.1 CONGENITAL ATRESIA OF RIGHT EXTERNAL EAR: Primary | ICD-10-CM

## 2021-12-13 PROCEDURE — 99213 OFFICE O/P EST LOW 20 MIN: CPT | Performed by: FAMILY MEDICINE

## 2021-12-13 SDOH — SOCIAL STABILITY - SOCIAL INSECURITY: ACCULTURATION DIFFICULTY: Z60.3

## 2021-12-13 ASSESSMENT — MIFFLIN-ST. JEOR: SCORE: 1183.7

## 2021-12-13 NOTE — TELEPHONE ENCOUNTER
[facility]    Phone Message    May a detailed message be left on voicemail: yes     Reason for Call: Other: Please call Debby at [facility] to discuss pt's next steps/possible surgery. Thank you.     Action Taken: Message routed to:  [facility] ([facility]): ENT    Travel Screening: Not Applicable

## 2021-12-13 NOTE — PROGRESS NOTES
Say was seen today for otalgia.    Diagnoses and all orders for this visit:    Congenital atresia of right external ear- I reviewed the from visits with Dr. Light and Dr. Parker. Pt has not received a call since seeing Dr. Parker for a follow up appt and needs help setting up the appt because her ear is itching a lot.  I did have our specialty  assist her in setting up a follow up visit with Dr. Light, as he requested in his note from 9/28/21 that he would like to see Say Daren back after she had seen Dr. Parker.    Itching of ear- chronic, of malformed ear.       Language barrier: Professional phone  used for this visit.   RTC as scheduled with Dr. Maricel JOHNSON:  Pt recently saw a surgeon about her ear, which itches and bothers her a lot.  She has been waiting for a call for a follow up appt and hasn't received one- is here today to get a follow up visit set up.  Her ear issues have not changed.    ROS neg other than HPI  Past Medical, social, family histories, medications, and allergies reviewed and updated      Current Outpatient Medications   Medication Instructions     cetirizine (ZYRTEC) 10 mg, Oral, DAILY     loperamide (IMODIUM A-D) 2 mg, Oral, 3 TIMES DAILY PRN     naproxen (NAPROSYN) 500 mg, Oral, 2 TIMES DAILY PRN     omeprazole (PRILOSEC) 40 mg, Oral, DAILY         /78 (BP Location: Right arm, Patient Position: Sitting, Cuff Size: Adult Regular)   Pulse 92   Temp 98.2  F (36.8  C) (Oral)   Resp 16   Ht 1.524 m (5')   Wt 57.7 kg (127 lb 4 oz)   LMP  (LMP Unknown)   SpO2 92%   BMI 24.85 kg/m    Patient is in no apparent physical distress.  Vitals are as recorded.  face is  normal, she is masked.  Conjunctiva are clear.  Gait is normal.  Skin is without rashes.  Mood and affect are appropriate.

## 2021-12-17 NOTE — TELEPHONE ENCOUNTER
Heavenly, Care coordinator calls on behalf of patient inquiring about her surgery. Patient was told she will get a call back but haven't heard anything. Can patient get an update? Please call patient back with  [phone].

## 2021-12-21 ENCOUNTER — OFFICE VISIT (OUTPATIENT)
Dept: FAMILY MEDICINE | Facility: CLINIC | Age: 36
End: 2021-12-21
Payer: COMMERCIAL

## 2021-12-21 VITALS
TEMPERATURE: 97.6 F | WEIGHT: 129 LBS | DIASTOLIC BLOOD PRESSURE: 78 MMHG | HEIGHT: 60 IN | BODY MASS INDEX: 25.32 KG/M2 | HEART RATE: 112 BPM | SYSTOLIC BLOOD PRESSURE: 112 MMHG | OXYGEN SATURATION: 99 %

## 2021-12-21 DIAGNOSIS — Q16.1 CONGENITAL ATRESIA OF RIGHT EXTERNAL EAR: ICD-10-CM

## 2021-12-21 DIAGNOSIS — R35.0 URINARY FREQUENCY: Primary | ICD-10-CM

## 2021-12-21 LAB
ALBUMIN UR-MCNC: NEGATIVE MG/DL
APPEARANCE UR: CLEAR
BACTERIA #/AREA URNS HPF: ABNORMAL /HPF
BILIRUB UR QL STRIP: NEGATIVE
COLOR UR AUTO: YELLOW
GLUCOSE UR STRIP-MCNC: NEGATIVE MG/DL
HGB UR QL STRIP: NEGATIVE
KETONES UR STRIP-MCNC: NEGATIVE MG/DL
LEUKOCYTE ESTERASE UR QL STRIP: NEGATIVE
MUCOUS THREADS #/AREA URNS LPF: PRESENT /LPF
NITRATE UR QL: NEGATIVE
PH UR STRIP: 7 [PH] (ref 5–7)
RBC #/AREA URNS AUTO: ABNORMAL /HPF
SP GR UR STRIP: 1.02 (ref 1–1.03)
SQUAMOUS #/AREA URNS AUTO: ABNORMAL /LPF
UROBILINOGEN UR STRIP-ACNC: 0.2 E.U./DL
WBC #/AREA URNS AUTO: ABNORMAL /HPF

## 2021-12-21 PROCEDURE — 81001 URINALYSIS AUTO W/SCOPE: CPT | Performed by: FAMILY MEDICINE

## 2021-12-21 PROCEDURE — 99214 OFFICE O/P EST MOD 30 MIN: CPT | Performed by: FAMILY MEDICINE

## 2021-12-21 ASSESSMENT — MIFFLIN-ST. JEOR: SCORE: 1191.64

## 2021-12-21 NOTE — PROGRESS NOTES
ASSESSMENT/PLAN:   Say was seen today for frequency.    Diagnoses and all orders for this visit:    Urinary frequency  Symptoms of urinary frequency, no urgency, no evidence of UTI on any of the urinalysis done in the past few months.  Symptoms lasting for over a year, her last child was born 5 years ago.  Differential does include pelvic floor dysfunction.  Patient given options, would like to see urology before starting medications.  -     UA with Microscopic reflex to Culture - Clinic Collect  -     Adult Urology Referral; Future  -     Urine Microscopic      Congenital atresia of right ear  Patient lost to follow-up with ENT, unsure when she is post tube be seen again for possible surgery.  Specialty  already attempted to call to make appointment, phone call encounter does not appear to be completed.  Per chart review, she does have an [facility] worker, previously enrolled with care coordination.  Will forward note to them to see if there is anything else we can do to help her.      Return in about 3 months (around 3/21/2022) for Urinary frequency, sooner if needed.       ======================================================    SUBJECTIVE  Oli Mercedes is a 36 year old female here for follow up urinary frequency.     She said all her symptoms of itching have improved.   She drinks a little bit of water, she urinates all the time. Especially during the day.   Increased urinary frequency, but no increased thirst. No evidence of diabetes.   She has to pee 3-4 times during the night. This also happens during the day.   She does not feel like she has urgency, she is able to make it to the bathroom, holding the urination for the about 5 or 6 minutes before having to go.  Has not had any accidents, does not consider herself incontinent.    She was also having loose stools.  Imodium has been helping with that.  No fevers, abdominal pain.  Discussed checking for parasites, patient declined.  She was screened [place].  Has not been back to [place] since.    ROS  Complete 10 point review of systems negative except as noted above in HPI      OBJECTIVE  /78 (BP Location: Right arm, Patient Position: Sitting, Cuff Size: Adult Regular)   Pulse 112   Temp 97.6  F (36.4  C) (Oral)   Ht 1.524 m (5')   Wt 58.5 kg (129 lb)   LMP 11/28/2021 (Within Weeks)   SpO2 99%   BMI 25.19 kg/m       General: Cooperative, pleasant, in no acute distress  HEENT: Congenital malformation of right ear.    CV: RRR, normal S1/S2, no murmur, rubs, gallops  Resp: No respiratory distress. Clear to auscultation bilaterally. No wheezes, rales, rhonchi  Abd: Nontender, nondistended, bowel sounds present  Ext: radial/pedal pulses +2 bilaterally  MSK: Normal muscle tone  Neuro: CN II-XII intact  Skin: warm, well perfused. No rashes  Psych: No suicidal or homicidal ideations, no self-harm.  Normal affect.    LABS & IMAGES   Results for orders placed or performed in visit on 12/21/21   UA with Microscopic reflex to Culture - Clinic Collect     Status: Normal    Specimen: Urine, NOS   Result Value Ref Range    Color Urine Yellow Colorless, Straw, Light Yellow, Yellow    Appearance Urine Clear Clear    Glucose Urine Negative Negative mg/dL    Bilirubin Urine Negative Negative    Ketones Urine Negative Negative mg/dL    Specific Gravity Urine 1.025 1.005 - 1.030    Blood Urine Negative Negative    pH Urine 7.0 5.0 - 7.0    Protein Albumin Urine Negative Negative mg/dL    Urobilinogen Urine 0.2 0.2, 1.0 E.U./dL    Nitrite Urine Negative Negative    Leukocyte Esterase Urine Negative Negative   Urine Microscopic     Status: Abnormal   Result Value Ref Range    Bacteria Urine None Seen None Seen /HPF    RBC Urine 0-2 0-2 /HPF /HPF    WBC Urine 0-5 0-5 /HPF /HPF    Squamous Epithelials Urine Few (A) None Seen /LPF    Mucus Urine Present (A) None Seen /LPF    Narrative    Urine Culture not indicated          ======================================================  30 minutes spent on the date of the encounter doing chart review, history and exam, documentation and further activities per the note    Visit was completed along with Zainab     Options for treatment and follow-up care were reviewed with the patient. Say Daren and/or guardian was engaged and actively involved in the decision making process. Say Daren and/or guardian verbalized understanding of the options discussed and was satisfied with the final plan.      Prashanth Johnson MD

## 2021-12-21 NOTE — Clinical Note
I think this patient has an [facility] worker?  Is that who would be best to contact to help her schedule follow-up for ENT and new referral for urology?  I cannot tell from Linh's last note if it is official that the arms worker is able to help yet.

## 2022-01-07 ENCOUNTER — NURSE TRIAGE (OUTPATIENT)
Dept: NURSING | Facility: CLINIC | Age: 37
End: 2022-01-07
Payer: COMMERCIAL

## 2022-01-07 NOTE — TELEPHONE ENCOUNTER
Pt called in states she has headache.  The headache is on her both side of her head.  The headache started yesterday.  The headache is constat.  The pain is 7/10 on the scale.  The pain is new for the Pt.  Pt has this kind of headache in the past.  No history of migraine headache.  No head injury recently.  No fever, no stiff neck,   No sore throat,   No runny nose.  Pt is not pregnant.  The disposition is to be seen with in 24 hours.  Pt request appointment.  The call is transferred to the .  Care advice given per protocol.  Patient agrees with care advice given.   Agreed to call back if he has additional symptoms or questions.    Can Hernandez Whitehouse Nurse Advisor 1/7/2022 2:52 PM        Reason for Disposition    [1] MODERATE headache (e.g., interferes with normal activities) AND [2] present > 24 hours AND [3] unexplained  (Exceptions: analgesics not tried, typical migraine, or headache part of viral illness)    Additional Information    Negative: Difficult to awaken or acting confused (e.g., disoriented, slurred speech)    Negative: [1] Weakness of the face, arm or leg on one side of the body AND [2] new onset    Negative: [1] Numbness of the face, arm or leg on one side of the body AND [2] new onset    Negative: [1] Loss of speech or garbled speech AND [2] new onset    Negative: Passed out (i.e., lost consciousness, collapsed and was not responding)    Negative: Sounds like a life-threatening emergency to the triager    Negative: Followed a head injury    Negative: Pregnant    Negative: Postpartum (from 0 to 6 weeks after delivery)    Negative: Traumatic Brain Injury (TBI) is suspected    Negative: Unable to walk, or can only walk with assistance (e.g., requires support)    Negative: Stiff neck (can't touch chin to chest)    Negative: Severe pain in one eye    Negative: [1] Other family members (or roommates) with headaches AND [2] possibility of carbon monoxide exposure    Negative: [1] SEVERE  "headache (e.g., excruciating) AND [2] \"worst headache\" of life    Negative: [1] SEVERE headache AND [2] sudden-onset (i.e., reaching maximum intensity within seconds)    Negative: [1] SEVERE headache AND [2] fever    Negative: Loss of vision or double vision (Exception: same as prior migraines)    Negative: [1] Fever > 100.0 F (37.8 C) AND [2] diabetes mellitus or weak immune system (e.g., HIV positive, cancer chemo, splenectomy, organ transplant, chronic steroids)    Negative: Patient sounds very sick or weak to the triager    Negative: [1] SEVERE headache (e.g., excruciating) AND [2] not improved after 2 hours of pain medicine    Negative: [1] Vomiting AND [2] 2 or more times (Exception: similar to previous migraines)    Negative: Fever > 104 F (40 C)    Protocols used: HEADACHE-A-AH      "

## 2022-01-19 ENCOUNTER — TELEPHONE (OUTPATIENT)
Dept: OTOLARYNGOLOGY | Facility: CLINIC | Age: 37
End: 2022-01-19

## 2022-01-19 ENCOUNTER — OFFICE VISIT (OUTPATIENT)
Dept: FAMILY MEDICINE | Facility: CLINIC | Age: 37
End: 2022-01-19
Payer: COMMERCIAL

## 2022-01-19 VITALS
OXYGEN SATURATION: 98 % | WEIGHT: 128 LBS | BODY MASS INDEX: 25.8 KG/M2 | SYSTOLIC BLOOD PRESSURE: 108 MMHG | HEIGHT: 59 IN | DIASTOLIC BLOOD PRESSURE: 80 MMHG | HEART RATE: 96 BPM | RESPIRATION RATE: 12 BRPM | TEMPERATURE: 98.4 F

## 2022-01-19 DIAGNOSIS — G44.219 EPISODIC TENSION-TYPE HEADACHE, NOT INTRACTABLE: Primary | ICD-10-CM

## 2022-01-19 DIAGNOSIS — Q17.2 MICROTIA OF RIGHT EAR: ICD-10-CM

## 2022-01-19 DIAGNOSIS — Z20.822 EXPOSURE TO 2019 NOVEL CORONAVIRUS: ICD-10-CM

## 2022-01-19 DIAGNOSIS — Q16.1 CONGENITAL ATRESIA OF RIGHT EXTERNAL EAR: ICD-10-CM

## 2022-01-19 LAB — SARS-COV-2 RNA RESP QL NAA+PROBE: NORMAL

## 2022-01-19 PROCEDURE — 99000 SPECIMEN HANDLING OFFICE-LAB: CPT | Performed by: FAMILY MEDICINE

## 2022-01-19 PROCEDURE — U0003 INFECTIOUS AGENT DETECTION BY NUCLEIC ACID (DNA OR RNA); SEVERE ACUTE RESPIRATORY SYNDROME CORONAVIRUS 2 (SARS-COV-2) (CORONAVIRUS DISEASE [COVID-19]), AMPLIFIED PROBE TECHNIQUE, MAKING USE OF HIGH THROUGHPUT TECHNOLOGIES AS DESCRIBED BY CMS-2020-01-R: HCPCS | Mod: 90 | Performed by: FAMILY MEDICINE

## 2022-01-19 PROCEDURE — U0005 INFEC AGEN DETEC AMPLI PROBE: HCPCS | Mod: 90 | Performed by: FAMILY MEDICINE

## 2022-01-19 PROCEDURE — 99213 OFFICE O/P EST LOW 20 MIN: CPT | Performed by: FAMILY MEDICINE

## 2022-01-19 ASSESSMENT — MIFFLIN-ST. JEOR: SCORE: 1163.97

## 2022-01-19 NOTE — PROGRESS NOTES
"OUTPATIENT VISIT NOTE                                                   Date of Visit: 1/19/2022     Chief Complaint   Patient presents with:  Headache: getting better  referral to ENT was given but hasn't heard from them            History of Present Illness   Say Daren is a 37 year old female with  by phone c/o headache.  The headache is gone now.  The headache lasted about one week.  Used lime with sugar.    Also has questions regarding ENT.  She has a malformed right ear that is very itchy.  She has been seen by ENT and has been expecting a call for another appointment, but has not received one    Finally, wants to be tested for Covid.  She received letter from school stating every one in the family should be tested. She denies symptoms         MEDICATIONS   Current Outpatient Medications   Medication     cetirizine (ZYRTEC) 10 MG tablet     loperamide (IMODIUM A-D) 2 MG tablet     naproxen (NAPROSYN) 500 MG tablet     omeprazole (PRILOSEC) 40 MG DR capsule     No current facility-administered medications for this visit.         SOCIAL HISTORY   Social History     Tobacco Use     Smoking status: Never Smoker     Smokeless tobacco: Former User   Substance Use Topics     Alcohol use: Never           Physical Exam   Vitals:    01/19/22 0858   BP: 108/80   Pulse: 96   Resp: 12   Temp: 98.4  F (36.9  C)   TempSrc: Oral   SpO2: 98%   Weight: 58.1 kg (128 lb)   Height: 1.495 m (4' 10.86\")        GEN:  NAD  Right ear: malformed pinna.  Small meatus surrounded by hyperpigmentation         Assessment and Plan     Episodic tension-type headache, not intractable  Resolved.  May use tylenol in the future.  Neck exercises and heat application    Congenital atresia of right external ear  - Otolaryngology Referral  Microtia of right ear  - Otolaryngology Referral  Reviewed chart--was seen by ENT plastics who recommended surgery but note states patient declined at that time.  Patient states she had not declined, and " would like to proceed with surgery.  We will assist in helping her set up an appointment to rediscuss with ENT      Exposure to 2019 novel coronavirus  tested  - Asymptomatic COVID-19 Virus (Coronavirus) by PCR Nose                   Discussed signs / symptoms that warrant urgent / emergent medical attention.     Recheck if worsening or not improving.       Dede Tanner MD          Pertinent History     The following portions of the patient's history were reviewed and updated as appropriate: allergies, current medications, past family history, past medical history, past social history, past surgical history and problem list.

## 2022-01-19 NOTE — TELEPHONE ENCOUNTER
Health Call Center    Phone Message    May a detailed message be left on voicemail: no     Reason for Call: Appointment Intake    Referring Provider Name: Dede Tanner MD [facility]  Diagnosis and/or Symptoms: Congenital atresia of right external ear [Q16.1]  Microtia of right ear [Q17.2]  Additional Information:   Patient was seen prior to this and has decided that she would like to have surgery on her ear. She saw Dr. huynh.          Patient has seen Dr. Hernández (11/17/2021)  and Dr. Light (9/28/2021)    Action Taken: Message routed to:  [facility] ([facility]): Candi Rivero [[id number]] - Zhanna-Op Coordinator     Travel Screening: Not Applicable

## 2022-01-20 LAB — SARS-COV-2 RNA RESP QL NAA+PROBE: NOT DETECTED

## 2022-01-20 NOTE — TELEPHONE ENCOUNTER
Hello, once patient is schedule, please sent message back to [facility] SPECIALTY  POOL so we can forward to  to set up ride. Thanks.

## 2022-02-25 ENCOUNTER — TRANSFERRED RECORDS (OUTPATIENT)
Dept: HEALTH INFORMATION MANAGEMENT | Facility: CLINIC | Age: 37
End: 2022-02-25
Payer: COMMERCIAL

## 2022-03-04 NOTE — TELEPHONE ENCOUNTER
Please set up transportation for the following appt. Thank you!    Apr 08, 2022  9:00 AM   (Arrive by 8:45 AM)   Return Visit with Karla Light MD   [facility] [place] ([facility] ) [address] [place] [id number] [phone]

## 2022-03-07 ENCOUNTER — OFFICE VISIT (OUTPATIENT)
Dept: FAMILY MEDICINE | Facility: CLINIC | Age: 37
End: 2022-03-07
Payer: COMMERCIAL

## 2022-03-07 VITALS
DIASTOLIC BLOOD PRESSURE: 78 MMHG | HEIGHT: 59 IN | TEMPERATURE: 97.8 F | BODY MASS INDEX: 27.01 KG/M2 | SYSTOLIC BLOOD PRESSURE: 112 MMHG | OXYGEN SATURATION: 98 % | WEIGHT: 134 LBS | HEART RATE: 106 BPM

## 2022-03-07 DIAGNOSIS — Q17.2 MICROTIA OF RIGHT EAR: ICD-10-CM

## 2022-03-07 DIAGNOSIS — H10.13 ALLERGIC CONJUNCTIVITIS, BILATERAL: ICD-10-CM

## 2022-03-07 DIAGNOSIS — R35.0 URINARY FREQUENCY: Primary | ICD-10-CM

## 2022-03-07 PROCEDURE — 99213 OFFICE O/P EST LOW 20 MIN: CPT | Performed by: FAMILY MEDICINE

## 2022-03-07 RX ORDER — DARIFENACIN 15 MG/1
TABLET, EXTENDED RELEASE ORAL
COMMUNITY
Start: 2022-02-25 | End: 2022-10-26

## 2022-03-07 NOTE — PROGRESS NOTES
"ASSESSMENT/PLAN:   Say was seen today for uti.    Diagnoses and all orders for this visit:    Urinary frequency  significant improvement on enablex prescribed by urology. Will continue. Patient does not want to make any changes.     Microtia of right ear  Appointment with surgery on 4/8/2022. No surgery date set yet. Will follow up in 2 months for preop - sooner if needed.     Allergic conjunctivitis, bilateral  Issue for over a year, requesting drops.   -     ketotifen (ZADITOR) 0.025 % ophthalmic solution; Place 1 drop into both eyes 2 times daily as needed for itching or dry eyes        Return in about 2 months (around 5/7/2022) for follow up on ear - will likely need preop, sooner if needed.       ======================================================    SUBJECTIVE  Say Daren is a 37 year old female here for follow up urinary frequency.     Specialist prescribed a medication, she did pick it up.  She thought it was the same medicaitons as we have prescribed before in the past.   She tried to take it and there was blood in her stool, but that resolved.   She still takes it now, it does help with the pain and frequecy with urination. The medication is not expensive.   She does not want to make any changes.     She has a follow up appointment on 4/8/22 with ENT.   She was under the impression that it was tomorrow.     ROS  Complete 10 point review of systems negative except as noted above in HPI      OBJECTIVE  /78 (BP Location: Left arm, Patient Position: Sitting, Cuff Size: Adult Regular)   Pulse 106   Temp 97.8  F (36.6  C) (Oral)   Ht 1.499 m (4' 11\")   Wt 60.8 kg (134 lb)   LMP 02/07/2022 (Approximate)   SpO2 98%   BMI 27.06 kg/m       General: Cooperative, pleasant, in no acute distress  HEENT: PERRL, EOMI. bialteral injection, no drainage.  TM normal bilaterally.  Pharynx normal without erythema.  Tonsils normal without hypertrophy or exudates.  Neck: no lymphadenopathy, no masses  CV: RRR, " normal S1/S2, no murmur, rubs, gallops  Resp: No respiratory distress. Clear to auscultation bilaterally. No wheezes, rales, rhonchi  Skin: warm, well perfused. No rashes  Psych: No suicidal or homicidal ideations, no self-harm.  Normal affect.    LABS & IMAGES   No results found for any visits on 03/07/22.      ======================================================    Visit was completed along with Zainab     Options for treatment and follow-up care were reviewed with the patient. Say Daren and/or guardian was engaged and actively involved in the decision making process. Say Daren and/or guardian verbalized understanding of the options discussed and was satisfied with the final plan.      Prashanth Johnson MD

## 2022-04-07 ENCOUNTER — TELEPHONE (OUTPATIENT)
Dept: OTOLARYNGOLOGY | Facility: CLINIC | Age: 37
End: 2022-04-07
Payer: COMMERCIAL

## 2022-04-07 NOTE — TELEPHONE ENCOUNTER
Apr 15, 2022 11:30 AM   (Arrive by 11:15 AM)   Return Visit with Karla Light MD   [facility] [place] ([facility] ) [address] [place] [id number] [phone]     New appt, please request ride for this appt and cxl the one for 4/8. Thank you.

## 2022-04-07 NOTE — TELEPHONE ENCOUNTER
Writer called to change appt due to provider emergency. Will reschedule to next Friday, 4/15 at 11:30. Writer to send message to transportation pool to reschedule her ride. Pt states agreement to the plan, no further questions.

## 2022-04-27 ENCOUNTER — OFFICE VISIT (OUTPATIENT)
Dept: FAMILY MEDICINE | Facility: CLINIC | Age: 37
End: 2022-04-27
Payer: COMMERCIAL

## 2022-04-27 VITALS
TEMPERATURE: 98 F | SYSTOLIC BLOOD PRESSURE: 110 MMHG | HEART RATE: 94 BPM | OXYGEN SATURATION: 97 % | WEIGHT: 131 LBS | DIASTOLIC BLOOD PRESSURE: 82 MMHG | HEIGHT: 59 IN | BODY MASS INDEX: 26.41 KG/M2

## 2022-04-27 DIAGNOSIS — R35.0 URINARY FREQUENCY: ICD-10-CM

## 2022-04-27 DIAGNOSIS — Z79.899 MEDICATION MANAGEMENT: ICD-10-CM

## 2022-04-27 DIAGNOSIS — Q16.1 CONGENITAL ATRESIA OF RIGHT EXTERNAL EAR: Primary | ICD-10-CM

## 2022-04-27 PROCEDURE — 99214 OFFICE O/P EST MOD 30 MIN: CPT | Performed by: FAMILY MEDICINE

## 2022-04-27 NOTE — PROGRESS NOTES
"ASSESSMENT/PLAN:   Say was seen today for follow up.    Diagnoses and all orders for this visit:    Congenital atresia of right external ear  Next appointment in June, patient unsure if it's for her surgery or to see ENT and unsure of location. Will route message to ENT provider's pool.     Medication management  Patient says she takes two medications but I am not sure which ones. I think maybe loperamide and maybe her med for overactive bladder?     Urinary frequency  Seen by urology, referred to bladder specialist. I am unsure if she received her anticholinergic (darefenicine) but she continues to be symptomatic. Will have her return for med check to see if she is currently taking it, if no results I would consider switching to a different anticholinergic because she still has urinary frequency.         Return in about 1 month (around 5/27/2022) for Medication Check, sooner if needed.   Declined covid booster.     ======================================================    SUBJECTIVE  Say Daren is a 37 year old female here for f/u ear problems    Anticipated that she would need a preop by now, but looks like multiple ENT visits canceled not due to patient's fault. Unclear if she needs a preop or anything for her upcoming procedure.     Still having some issues with her urinary tract. She has improved on her medication prescribed by urology, but does not feel back to normal. Her chart shows that she was seen at [facility] on 4/8/22 and UA was normal. She was prescribed darifenacin again, but she does not feel any difference. They referred her to [facility] in [place] on July 7th at 9am with KATIUSKA Cruz  Complete 10 point review of systems negative except as noted above in HPI      OBJECTIVE  /82 (BP Location: Left arm, Patient Position: Sitting, Cuff Size: Adult Regular)   Pulse 94   Temp 98  F (36.7  C) (Oral)   Ht 1.499 m (4' 11\")   Wt 59.4 kg (131 lb)   LMP 04/06/2022 " (Within Weeks)   SpO2 97%   BMI 26.46 kg/m       General: Cooperative, pleasant, in no acute distress  HEENT: PERRL, EOMI.  External deformity or right ear.   Neck: no lymphadenopathy, no masses  CV: RRR, normal S1/S2, no murmur, rubs, gallops  Resp: No respiratory distress. Clear to auscultation bilaterally. No wheezes, rales, rhonchi  Skin: warm, well perfused. No rashes  Psych: No suicidal or homicidal ideations, no self-harm.  Normal affect.    LABS & IMAGES   No results found for any visits on 04/27/22.      ======================================================  30 minutes spent on the date of the encounter doing chart review, history and exam, documentation and further activities per the note    Visit was completed along with Zainab     Options for treatment and follow-up care were reviewed with the patient. Say Daren and/or guardian was engaged and actively involved in the decision making process. Say Daren and/or guardian verbalized understanding of the options discussed and was satisfied with the final plan.      Prashanth Johnson MD

## 2022-05-31 ENCOUNTER — OFFICE VISIT (OUTPATIENT)
Dept: FAMILY MEDICINE | Facility: CLINIC | Age: 37
End: 2022-05-31
Payer: COMMERCIAL

## 2022-05-31 VITALS
DIASTOLIC BLOOD PRESSURE: 82 MMHG | SYSTOLIC BLOOD PRESSURE: 112 MMHG | BODY MASS INDEX: 26.21 KG/M2 | TEMPERATURE: 98.7 F | OXYGEN SATURATION: 99 % | HEIGHT: 59 IN | HEART RATE: 100 BPM | WEIGHT: 130 LBS

## 2022-05-31 DIAGNOSIS — M79.672 LEFT FOOT PAIN: ICD-10-CM

## 2022-05-31 DIAGNOSIS — N92.6 MISSED MENSES: ICD-10-CM

## 2022-05-31 DIAGNOSIS — G44.219 EPISODIC TENSION-TYPE HEADACHE, NOT INTRACTABLE: ICD-10-CM

## 2022-05-31 DIAGNOSIS — R05.9 COUGH: Primary | ICD-10-CM

## 2022-05-31 DIAGNOSIS — R35.0 URINARY FREQUENCY: ICD-10-CM

## 2022-05-31 DIAGNOSIS — Z32.01 PREGNANCY TEST POSITIVE: ICD-10-CM

## 2022-05-31 LAB
ALBUMIN UR-MCNC: NEGATIVE MG/DL
AMORPH CRY #/AREA URNS HPF: ABNORMAL /HPF
APPEARANCE UR: ABNORMAL
BACTERIA #/AREA URNS HPF: ABNORMAL /HPF
BILIRUB UR QL STRIP: NEGATIVE
COLOR UR AUTO: YELLOW
GLUCOSE UR STRIP-MCNC: 100 MG/DL
HCG UR QL: POSITIVE
HGB UR QL STRIP: NEGATIVE
KETONES UR STRIP-MCNC: NEGATIVE MG/DL
LEUKOCYTE ESTERASE UR QL STRIP: NEGATIVE
NITRATE UR QL: NEGATIVE
PH UR STRIP: 7 [PH] (ref 5–7)
RBC #/AREA URNS AUTO: ABNORMAL /HPF
SP GR UR STRIP: 1.02 (ref 1–1.03)
SQUAMOUS #/AREA URNS AUTO: ABNORMAL /LPF
UROBILINOGEN UR STRIP-ACNC: 0.2 E.U./DL
WBC #/AREA URNS AUTO: ABNORMAL /HPF

## 2022-05-31 PROCEDURE — 81001 URINALYSIS AUTO W/SCOPE: CPT | Performed by: FAMILY MEDICINE

## 2022-05-31 PROCEDURE — 81025 URINE PREGNANCY TEST: CPT | Performed by: FAMILY MEDICINE

## 2022-05-31 PROCEDURE — 99214 OFFICE O/P EST MOD 30 MIN: CPT | Performed by: FAMILY MEDICINE

## 2022-05-31 RX ORDER — NAPROXEN 500 MG/1
500 TABLET ORAL 2 TIMES DAILY PRN
Qty: 60 TABLET | Refills: 2 | Status: SHIPPED | OUTPATIENT
Start: 2022-05-31 | End: 2022-05-31

## 2022-05-31 RX ORDER — PRENATAL VIT/IRON FUM/FOLIC AC 27MG-0.8MG
1 TABLET ORAL DAILY
Qty: 90 TABLET | Refills: 3 | Status: SHIPPED | OUTPATIENT
Start: 2022-05-31 | End: 2023-02-06

## 2022-05-31 RX ORDER — ACETAMINOPHEN 500 MG
1000 TABLET ORAL 3 TIMES DAILY PRN
Qty: 100 TABLET | Refills: 3 | Status: SHIPPED | OUTPATIENT
Start: 2022-05-31 | End: 2022-10-26

## 2022-05-31 NOTE — PROGRESS NOTES
"ASSESSMENT/PLAN:   Say was seen today for cough.    Diagnoses and all orders for this visit:    Cough  Wants to continue cetirizine, helps her with cough.       Urinary frequency  Seeing urology, but now with her new pregnancy  -     UA Macro with Reflex to Micro and Culture - lab collect; Future  -     UA Macro with Reflex to Micro and Culture - lab collect  -     Urine Microscopic    Missed menses  -     HCG qualitative urine; Future  -     HCG qualitative urine    Pregnancy test positive  Now  at 8w4d with EDC 2023 by LMP 2022. Start PNV, get dating US, will help patient schedule IOB. She says she is happy about the pregnancy.   -     US OB < 14 Weeks Single; Future  -     Prenatal Vit-Fe Fumarate-FA (PRENATAL MULTIVITAMIN W/IRON) 27-0.8 MG tablet; Take 1 tablet by mouth daily    Episodic tension-type headache, not intractable  -     acetaminophen (TYLENOL) 500 MG tablet; Take 2 tablets (1,000 mg) by mouth 3 times daily as needed for mild pain  - Discontinue all NSAIDs.           Return in about 2 weeks (around 2022) for IOB.       ======================================================    SUBJECTIVE  Say Daren is a 37 year old female here for med check.     She has two unlabeled pills. When looked up, they are just more omeprazole.     Cough.   She wants to keep taking the cetirizine, no new medicine.     She does not have her darfenacine (enablex). She wakes up once per night to pee.     She has not had a period in 2 months.   She usually has regular periods.     ROS  Complete 10 point review of systems negative except as noted above in HPI      OBJECTIVE  /82 (BP Location: Left arm, Patient Position: Sitting, Cuff Size: Adult Regular)   Pulse 100   Temp 98.7  F (37.1  C) (Oral)   Ht 1.499 m (4' 11\")   Wt 59 kg (130 lb)   LMP 2022 (Within Weeks)   SpO2 99%   BMI 26.26 kg/m       General: Cooperative, pleasant, in no acute distress  HEENT: PERRL, EOMI.  TM normal " bilaterally.  Pharynx normal without erythema.  Tonsils normal without hypertrophy or exudates.  Neck: no lymphadenopathy, no masses  CV: RRR, normal S1/S2, no murmur, rubs, gallops  Resp: No respiratory distress. Clear to auscultation bilaterally. No wheezes, rales, rhonchi  Abd: Nontender, nondistended, bowel sounds present  Ext: radial/pedal pulses +2 bilaterally  MSK: Normal muscle tone  Neuro: CN II-XII intact  Skin: warm, well perfused. No rashes  Psych: No suicidal or homicidal ideations, no self-harm.  Normal affect.    LABS & IMAGES   Results for orders placed or performed in visit on 05/31/22   UA Macro with Reflex to Micro and Culture - lab collect     Status: Abnormal    Specimen: Urine, Midstream   Result Value Ref Range    Color Urine Yellow Colorless, Straw, Light Yellow, Yellow    Appearance Urine Cloudy (A) Clear    Glucose Urine 100  (A) Negative mg/dL    Bilirubin Urine Negative Negative    Ketones Urine Negative Negative mg/dL    Specific Gravity Urine 1.020 1.005 - 1.030    Blood Urine Negative Negative    pH Urine 7.0 5.0 - 7.0    Protein Albumin Urine Negative Negative mg/dL    Urobilinogen Urine 0.2 0.2, 1.0 E.U./dL    Nitrite Urine Negative Negative    Leukocyte Esterase Urine Negative Negative   HCG qualitative urine     Status: Abnormal   Result Value Ref Range    hCG Urine Qualitative Positive (A) Negative   Urine Microscopic     Status: Abnormal   Result Value Ref Range    Bacteria Urine None Seen None Seen /HPF    RBC Urine None Seen 0-2 /HPF /HPF    WBC Urine 0-5 0-5 /HPF /HPF    Squamous Epithelials Urine Few (A) None Seen /LPF    Amorphous Crystals Urine Many (A) None Seen /HPF    Narrative    Urine Culture not indicated         ======================================================    Visit was completed along with Zainab     Options for treatment and follow-up care were reviewed with the patient. Say Daren and/or guardian was engaged and actively involved in the decision  making process. Say Daren and/or guardian verbalized understanding of the options discussed and was satisfied with the final plan.      Prashanth Johnson MD

## 2022-06-24 ENCOUNTER — OFFICE VISIT (OUTPATIENT)
Dept: OTOLARYNGOLOGY | Facility: CLINIC | Age: 37
End: 2022-06-24
Payer: COMMERCIAL

## 2022-06-24 VITALS
DIASTOLIC BLOOD PRESSURE: 87 MMHG | TEMPERATURE: 98 F | HEIGHT: 59 IN | HEART RATE: 92 BPM | SYSTOLIC BLOOD PRESSURE: 116 MMHG | BODY MASS INDEX: 25.8 KG/M2 | WEIGHT: 128 LBS

## 2022-06-24 DIAGNOSIS — Q16.1 CONGENITAL ATRESIA OF RIGHT EXTERNAL EAR: Primary | ICD-10-CM

## 2022-06-24 DIAGNOSIS — Q17.2 MICROTIA OF RIGHT EAR: ICD-10-CM

## 2022-06-24 PROCEDURE — 99213 OFFICE O/P EST LOW 20 MIN: CPT | Mod: 25 | Performed by: OTOLARYNGOLOGY

## 2022-06-24 PROCEDURE — 92504 EAR MICROSCOPY EXAMINATION: CPT | Performed by: OTOLARYNGOLOGY

## 2022-06-24 ASSESSMENT — PAIN SCALES - GENERAL: PAINLEVEL: NO PAIN (0)

## 2022-06-24 NOTE — PATIENT INSTRUCTIONS
You were seen in the ENT Clinic today by Dr. Light. If you have any questions or concerns after your appointment, please contact us (see below)      2.   Please return to clinic after your pregnancy - - we would need to meet you again before you you considered surgery.         How to Contact Us:  Send a Sweeten message to your provider. Our team will respond to you via Sweeten. Occasionally, we will need to call you to get further information.  For urgent matters (Monday-Friday), call the ENT Clinic: [phone] and speak with a call center team member - they will route your call appropriately.   If you'd like to speak directly with a nurse, please find our contact information below. We do our best to check voicemail frequently throughout the day, and will work to call you back within 1-2 days. For urgent matters, please use the general clinic phone numbers listed above.      Kacie OLMOS RN  ENT RN Care Coordinator  Direct: [phone]    Love JARVIS LPN  Direct: [phone]

## 2022-06-24 NOTE — LETTER
2022       RE: Oli Mercedes  [address] [place] [address]     Dear Colleague,    Thank you for referring your patient, Oli Mercedes, to the [facility] [place] [facility]. Please see a copy of my visit note below.      Neurotology Clinic      Name: Oli Mercedes  MRN: [id number]  Age: 36 year old  : 1985  Referring provider: Dr. Tony Pardo, [facility] ENT  2022       History of Present Illness:   Oli Mercedes is a 37 year old female who presents for follow up for congenital atresia of the right external ear. Consultation was originally kindly requested by Dr. Tony Pardo.  The patient reports no useable hearing on the right and has not had any history of surgery on this ear, dizziness, or vertigo. The ear has suffered recurrent drainage.  We discussed surgery to widen the soft tissue opening and to explore the canal to determine if additional middle ear work was needed.  She was referred to and saw Dr. Morgan Elizalde to discuss additional considerations regarding the auricle for this or future procedures. She cleans the ear daily with a variety of implements, as she has for many years.     Today, she reports the ear has been completely dry since we treated it with drops a few months ago.  She scheduled this appointment to discuss surgery.  She recently learned she is pregnant.    Review of Systems:   Pertinent items are noted in HPI or as in patient entered ROS below, remainder of complete ROS is negative.    ENT ROS 2022   Neurology: Headache   Ears, Nose, Throat: Hearing loss      Active Medications:     Current Outpatient Medications:      acetaminophen (TYLENOL) 500 MG tablet, Take 2 tablets (1,000 mg) by mouth 3 times daily as needed for mild pain (Patient not taking: Reported on 2022), Disp: 100 tablet, Rfl: 3     cetirizine (ZYRTEC) 10 MG tablet, Take 1 tablet (10 mg) by mouth daily (Patient not  taking: Reported on 7/6/2022), Disp: 60 tablet, Rfl: 3     darifenacin ER (ENABLEX) 15 MG 24 hr tablet, 1 tablet with liquid (Patient not taking: Reported on 7/6/2022), Disp: , Rfl:      ketotifen (ZADITOR) 0.025 % ophthalmic solution, Place 1 drop into both eyes 2 times daily as needed for itching or dry eyes (Patient not taking: Reported on 7/6/2022), Disp: 10 mL, Rfl: 4     loperamide (IMODIUM A-D) 2 MG tablet, Take 1 tablet (2 mg) by mouth 3 times daily as needed for diarrhea (Patient not taking: Reported on 7/6/2022), Disp: 90 tablet, Rfl: 3     omeprazole (PRILOSEC) 40 MG DR capsule, Take 1 capsule (40 mg) by mouth daily (Patient not taking: Reported on 7/6/2022), Disp: 90 capsule, Rfl: 3     Prenatal Vit-Fe Fumarate-FA (PRENATAL MULTIVITAMIN W/IRON) 27-0.8 MG tablet, Take 1 tablet by mouth daily, Disp: 90 tablet, Rfl: 3     doxylamine (UNISOM) 25 MG TABS tablet, Take 1 tablet (25 mg) by mouth nightly as needed (nausea), Disp: 60 tablet, Rfl: 1     Prenatal Vit-Fe Sulfate-FA-DHA (PRENATAL VITAMIN/MIN +DHA) 27-0.8-200 MG CAPS, Take 1 tablet by mouth daily, Disp: 100 capsule, Rfl: 3     vitamin B6 (PYRIDOXINE) 50 MG TABS, Take 1 tablet (50 mg) by mouth daily, Disp: 90 tablet, Rfl: 3      Allergies:   Patient has no known allergies.      Past Medical History:  No past medical history on file.  Patient Active Problem List   Diagnosis     Urticaria     Congenital atresia of right external ear     Mixed hearing loss, bilateral     Microtia of right ear     Language barrier affecting health care     Urinary frequency     Antepartum multigravida of advanced maternal age      Past Surgical History:  No past surgical history on file.    Family History:   No family history on file.      Social History:   Social History     Tobacco Use     Smoking status: Never Smoker     Smokeless tobacco: Former User   Vaping Use     Vaping Use: Never used   Substance Use Topics     Alcohol use: Never     Drug use: Never      Physical  "Exam:   /87   Pulse 92   Temp 98  F (36.7  C)   Ht 1.499 m (4' 11\")   Wt 58.1 kg (128 lb)   BMI 25.85 kg/m       Constitutional:  The patient was accompanied by her daughter, well-groomed, and in no acute distress.     Skin: Normal:  warm and pink without rash   Neurologic: Alert and oriented x 3.  CN's III-XII within normal limits.  Voice normal.    Psychiatric: The patient's affect was calm, cooperative, and appropriate.     Communication:  Normal; communicates verbally, normal voice quality.   Respiratory: Breathing comfortably without stridor or exertion of accessory muscles.    Eyes: Pupils were equal and reactive.  Extraocular movement intact.     Ears: Pinnae and tragus non-tender.  EAC's and TM's were evaluated, results below.        Otologic microscope exam:  Right ear: Microtia, small superior remnant, tag at the level of the lobule, and posterior to that is a small opening. This just admits a #7 suction at the opening. This is dry - no drainage. Beyond the opening, there is an ear canal.  The lining appears healthy today, but I cannot perceive the most medial aspect because it is too narrow.       Left ear: Grade I microtia. There is a through and through hole in the auricle posterior to the triangular fossa. Ear canal is patent. The TM is intact, but looks like it has either been repaired or remodeled from infections posteriorly.    Audiogram:  AUDIOGRAM: She underwent an audiogram at her initial visit earlier this year.   This demonstrated:   Right profound loss - question crossover of bone line given profound thresholds and reduction in word recognition  Left moderate mixed hearing loss        Right: Speech reception threshold is 115 dB with 32% word recognition at 115 dB. DNT Tympanogram  Left: Speech reception threshold is 45 dB with 88% word recognition at 75 dB. Tympanogram A type     Audiogram was independently reviewed.    Imaging:  CT TEMPORAL BONES WO " CONTRAST:  (07/30/2021)                                                      IMPRESSION:  1.  Multiple abnormalities of the right ear including: Small soft tissue opening at the lateral end of the external auditory canal, lack of an identifiable tympanic membrane and atresia or erosion of the manubrium of the malleus and long delmer of the incus with no definite articulation between the incus and stapes.  2.  Labyrinthine structures on the right are normal.  3.  Normal left temporal bone.    All imaging was independently reviewed.       Assessment and Plan:  Oli Mercedes is a 37 year old woman right congenital aural atresia and microtia.  She has been interested in surgery to open the meatus to avoid recurrent infection and to permit us to examine the canal and TM for signs of cholesteatoma.  She returned today to plan surgery.    She very recently learned she is expecting a child later this year.  I congratulated her and she is indeed excited about the pregnancy.  I explained that we cannot do surgery until after the baby is delivered.  As she is doing very well right now, it is very reasonable to manage the ear with observation and cleaning as needed and to meet again when scheduling would be appropriate.    Karla Light MD  Otology & Neurotology  [facility]

## 2022-06-24 NOTE — NURSING NOTE
"Chief Complaint   Patient presents with     RECHECK     Follow up surgical consult      Blood pressure 116/87, pulse 92, temperature 98  F (36.7  C), height 1.499 m (4' 11\"), weight 58.1 kg (128 lb), not currently breastfeeding.    Paul Meza LPN    "

## 2022-07-06 ENCOUNTER — OFFICE VISIT (OUTPATIENT)
Dept: FAMILY MEDICINE | Facility: CLINIC | Age: 37
End: 2022-07-06
Payer: COMMERCIAL

## 2022-07-06 VITALS
HEIGHT: 59 IN | OXYGEN SATURATION: 99 % | RESPIRATION RATE: 12 BRPM | DIASTOLIC BLOOD PRESSURE: 70 MMHG | HEART RATE: 93 BPM | WEIGHT: 127 LBS | TEMPERATURE: 98.4 F | SYSTOLIC BLOOD PRESSURE: 92 MMHG | BODY MASS INDEX: 25.6 KG/M2

## 2022-07-06 DIAGNOSIS — O09.529 ANTEPARTUM MULTIGRAVIDA OF ADVANCED MATERNAL AGE: ICD-10-CM

## 2022-07-06 DIAGNOSIS — N92.6 MISSED PERIOD: ICD-10-CM

## 2022-07-06 DIAGNOSIS — Z34.90 EARLY STAGE OF PREGNANCY: Primary | ICD-10-CM

## 2022-07-06 DIAGNOSIS — O21.9 NAUSEA AND VOMITING DURING PREGNANCY: ICD-10-CM

## 2022-07-06 LAB
ABO/RH(D): NORMAL
AMPHETAMINES UR QL: NOT DETECTED
ANTIBODY SCREEN: NEGATIVE
BARBITURATES UR QL SCN: NOT DETECTED
BENZODIAZ UR QL SCN: NOT DETECTED
BUPRENORPHINE UR QL: NOT DETECTED
CANNABINOIDS UR QL: NOT DETECTED
COCAINE UR QL SCN: NOT DETECTED
D-METHAMPHET UR QL: NOT DETECTED
ERYTHROCYTE [DISTWIDTH] IN BLOOD BY AUTOMATED COUNT: 13.1 % (ref 10–15)
HCG UR QL: POSITIVE
HCT VFR BLD AUTO: 42.4 % (ref 35–47)
HGB BLD-MCNC: 14.3 G/DL (ref 11.7–15.7)
MCH RBC QN AUTO: 29.7 PG (ref 26.5–33)
MCHC RBC AUTO-ENTMCNC: 33.7 G/DL (ref 31.5–36.5)
MCV RBC AUTO: 88 FL (ref 78–100)
METHADONE UR QL SCN: NOT DETECTED
OPIATES UR QL SCN: NOT DETECTED
OXYCODONE UR QL SCN: NOT DETECTED
PCP UR QL SCN: NOT DETECTED
PLATELET # BLD AUTO: 279 10E3/UL (ref 150–450)
PROPOXYPH UR QL: NOT DETECTED
RBC # BLD AUTO: 4.81 10E6/UL (ref 3.8–5.2)
SPECIMEN EXPIRATION DATE: NORMAL
TRICYCLICS UR QL SCN: NOT DETECTED
WBC # BLD AUTO: 9.3 10E3/UL (ref 4–11)

## 2022-07-06 PROCEDURE — 87086 URINE CULTURE/COLONY COUNT: CPT | Performed by: FAMILY MEDICINE

## 2022-07-06 PROCEDURE — 86762 RUBELLA ANTIBODY: CPT | Performed by: FAMILY MEDICINE

## 2022-07-06 PROCEDURE — 83655 ASSAY OF LEAD: CPT | Mod: 90 | Performed by: FAMILY MEDICINE

## 2022-07-06 PROCEDURE — 86900 BLOOD TYPING SEROLOGIC ABO: CPT | Performed by: FAMILY MEDICINE

## 2022-07-06 PROCEDURE — 99000 SPECIMEN HANDLING OFFICE-LAB: CPT | Performed by: FAMILY MEDICINE

## 2022-07-06 PROCEDURE — 80306 DRUG TEST PRSMV INSTRMNT: CPT | Performed by: FAMILY MEDICINE

## 2022-07-06 PROCEDURE — 86850 RBC ANTIBODY SCREEN: CPT | Performed by: FAMILY MEDICINE

## 2022-07-06 PROCEDURE — 87389 HIV-1 AG W/HIV-1&-2 AB AG IA: CPT | Performed by: FAMILY MEDICINE

## 2022-07-06 PROCEDURE — 86901 BLOOD TYPING SEROLOGIC RH(D): CPT | Performed by: FAMILY MEDICINE

## 2022-07-06 PROCEDURE — 87340 HEPATITIS B SURFACE AG IA: CPT | Performed by: FAMILY MEDICINE

## 2022-07-06 PROCEDURE — 87591 N.GONORRHOEAE DNA AMP PROB: CPT | Performed by: FAMILY MEDICINE

## 2022-07-06 PROCEDURE — 81025 URINE PREGNANCY TEST: CPT | Performed by: FAMILY MEDICINE

## 2022-07-06 PROCEDURE — 86780 TREPONEMA PALLIDUM: CPT | Performed by: FAMILY MEDICINE

## 2022-07-06 PROCEDURE — 87491 CHLMYD TRACH DNA AMP PROBE: CPT | Performed by: FAMILY MEDICINE

## 2022-07-06 PROCEDURE — 99214 OFFICE O/P EST MOD 30 MIN: CPT | Performed by: FAMILY MEDICINE

## 2022-07-06 PROCEDURE — 85027 COMPLETE CBC AUTOMATED: CPT | Performed by: FAMILY MEDICINE

## 2022-07-06 PROCEDURE — 99207 PR FIRST OB VISIT: CPT | Performed by: FAMILY MEDICINE

## 2022-07-06 PROCEDURE — 36415 COLL VENOUS BLD VENIPUNCTURE: CPT | Performed by: FAMILY MEDICINE

## 2022-07-06 RX ORDER — CHOLECALCIFEROL (VITAMIN D3) 25 MCG
1 TABLET,CHEWABLE ORAL DAILY
Qty: 100 CAPSULE | Refills: 3 | Status: SHIPPED | OUTPATIENT
Start: 2022-07-06 | End: 2022-10-26

## 2022-07-06 RX ORDER — LANOLIN ALCOHOL/MO/W.PET/CERES
50 CREAM (GRAM) TOPICAL DAILY
Qty: 90 TABLET | Refills: 3 | Status: SHIPPED | OUTPATIENT
Start: 2022-07-06 | End: 2022-10-26

## 2022-07-06 NOTE — PROGRESS NOTES
New OB Clinic Note - 2022   Visit was completed along with Zainab .   SUBJECTIVE:  Oli Mercedes is a 37 year old  female @ approximately 14 weeks who is here for new OB visit.   LMP sometime at the end of March. No Ultrasound yet. Pregnancy was planned. She and her  have 3 other children, ages 11, 10 and 6, all born in [place].   Current Concerns: none.   She does have nausea. Would like some medication for this.   She denies bleeding, cramping. No loss of fluid. She denies HA.   Denies dysuria or hematuria.   Denies constipation.  OB History:  Patient is . Prior Pregnancies:  OB History    Para Term  AB Living   4 3 3 0 0 3   SAB IAB Ectopic Multiple Live Births   0 0 0 0 0      # Outcome Date GA Lbr Oseas/2nd Weight Sex Delivery Anes PTL Lv   4 Term            3 Term            2 Term            1               No known complications that she remembers.     Social Hx:   She has support from her family and father of baby is involved.  She has not used tobacco, has not used EtOH and has not used illicit drugs.  Current Medications: prenatal vitamins    No past medical history on file.  No past surgical history on file.  No family history on file.  Current Outpatient Medications   Medication     doxylamine (UNISOM) 25 MG TABS tablet     Prenatal Vit-Fe Fumarate-FA (PRENATAL MULTIVITAMIN W/IRON) 27-0.8 MG tablet     Prenatal Vit-Fe Sulfate-FA-DHA (PRENATAL VITAMIN/MIN +DHA) 27-0.8-200 MG CAPS     vitamin B6 (PYRIDOXINE) 50 MG TABS     acetaminophen (TYLENOL) 500 MG tablet     cetirizine (ZYRTEC) 10 MG tablet     darifenacin ER (ENABLEX) 15 MG 24 hr tablet     ketotifen (ZADITOR) 0.025 % ophthalmic solution     loperamide (IMODIUM A-D) 2 MG tablet     omeprazole (PRILOSEC) 40 MG DR capsule     No current facility-administered medications for this visit.     ?  OBJECTIVE:  Vitals:    22 1343   BP: 92/70   Pulse: 93   Resp: 12   Temp: 98.4  F (36.9  C)  "  TempSrc: Oral   SpO2: 99%   Weight: 57.6 kg (127 lb)   Height: 1.499 m (4' 11\")     Gen: Awake, alert, in no acute distress  HEENT: oral mucosa is moist and pink, dentition is adequate   Neck: Thyroid is non-enlarged, without nodules or tenderness  CV: RRR with normal S1 and S2. No murmurs appreciated.  Resp: Lungs are clear to auscultation bilaterally without crackles or wheezing.  Abd: non-tender, non-distended. Bowel sounds present.   Pelvic Exam: Vagina and vulva are normal; no discharge is noted. Cervix normal without lesions. Uterus anteverted and mobile, normal in size and shape without tenderness. Adnexa normal in size without masses or tenderness. She is not due for a pap smear today.  Lower extremities: No edema  Neuro: No focal deficits   bpm  Results for orders placed or performed in visit on 22   HCG qualitative urine     Status: Abnormal   Result Value Ref Range    hCG Urine Qualitative Positive (A) Negative     ASSESSMENT/PLAN:  Say Daren is a 37 year old  at approximately 14 weeks by LMP. Estimated Date of Delivery: early 2023.   1. Discussed recommended weight gain, healthy eating habits, exercise, common first trimester concerns (nausea, vomiting, constipation, fatigue, GERD, urinary frequency) and warning signs for miscarriage and  labor (bleeding, cramping).   2. Offered NIPT, as she is advanced maternal age. She does want to do this and it was drawn today.   3. Discussed seatbelts, diet, exercise, caffeine intake, daily vitamins, child birth classes, choosing a baby doctor, and regular prenatal exams   Say was seen today for pregnancy test.    Diagnoses and all orders for this visit:    Early stage of pregnancy  -     Prenatal Vit-Fe Sulfate-FA-DHA (PRENATAL VITAMIN/MIN +DHA) 27-0.8-200 MG CAPS; Take 1 tablet by mouth daily    Missed period  -     HCG qualitative urine    Nausea and vomiting during pregnancy: will order vitamin B6 and unisom for nausea.   -    "  vitamin B6 (PYRIDOXINE) 50 MG TABS; Take 1 tablet (50 mg) by mouth daily  -     doxylamine (UNISOM) 25 MG TABS tablet; Take 1 tablet (25 mg) by mouth nightly as needed (nausea)    Antepartum multigravida of advanced maternal age: obtaining dating ultrasound  -     ABO/Rh type and screen; Future  -     Hepatitis B surface antigen; Future  -     CBC with platelets; Future  -     HIV Antigen Antibody Combo; Future  -     Rubella Antibody IgG; Future  -     Treponema Abs w Reflex to RPR and Titer; Future  -     Urine Culture Aerobic Bacterial  -     Lead, Venous Blood; Future  -     Drug Abuse Screen Panel 13, Urine (Pain Care Package) - lab collect; Future  -     Chlamydia & Gonorrhea by PCR, GICH/Range - Clinic Collect  -     US OB < 14 Weeks Single; Future  -     Non Invasive Prenatal Test Cell Free DNA; Future  -     Drug Abuse Screen Panel 13, Urine (Pain Care Package) - lab collect  -     ABO/Rh type and screen  -     Hepatitis B surface antigen  -     CBC with platelets  -     HIV Antigen Antibody Combo  -     Rubella Antibody IgG  -     Treponema Abs w Reflex to RPR and Titer  -     Lead, Venous Blood  -     Non Invasive Prenatal Test Cell Free DNA      ?  RTC in 4 weeks or sooner if problems. At next visit: ask about breastfeeding, if she is working, check on history of pregnancy complications.     Marianne Rodriguez MD    Options for treatment and follow-up care were reviewed with the patient and/or guardian. Say Daren and/or guardian engaged in the decision making process and verbalized understanding of the options discussed and agreed with the final plan.

## 2022-07-07 LAB
C TRACH DNA SPEC QL PROBE+SIG AMP: NEGATIVE
HBV SURFACE AG SERPL QL IA: NONREACTIVE
HIV 1+2 AB+HIV1 P24 AG SERPL QL IA: NONREACTIVE
N GONORRHOEA DNA SPEC QL NAA+PROBE: NEGATIVE
RUBV IGG SERPL QL IA: 2.95 INDEX
RUBV IGG SERPL QL IA: POSITIVE
T PALLIDUM AB SER QL: NONREACTIVE

## 2022-07-08 ENCOUNTER — HOSPITAL ENCOUNTER (OUTPATIENT)
Dept: ULTRASOUND IMAGING | Facility: HOSPITAL | Age: 37
Discharge: HOME | End: 2022-07-08
Attending: FAMILY MEDICINE | Admitting: FAMILY MEDICINE
Payer: COMMERCIAL

## 2022-07-08 DIAGNOSIS — O09.529 ANTEPARTUM MULTIGRAVIDA OF ADVANCED MATERNAL AGE: ICD-10-CM

## 2022-07-08 LAB — BACTERIA UR CULT: NORMAL

## 2022-07-08 PROCEDURE — 76801 OB US < 14 WKS SINGLE FETUS: CPT

## 2022-07-09 LAB — LEAD BLDV-MCNC: 2.2 UG/DL

## 2022-07-12 NOTE — PROGRESS NOTES
Neurotology Clinic      Name: Oli Mercedes  MRN: [id number]  Age: 36 year old  : 1985  Referring provider: Dr. 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 Consultation was originally kindly requested by Dr. Tony Pardo.  The patient reports no useable hearing on the right and has not had any history of surgery on this ear, dizziness, or vertigo. The ear has suffered recurrent drainage.  We discussed surgery to widen the soft tissue opening and to explore the canal to determine if additional middle ear work was needed.  She was referred to and saw Dr. Morgan Elizalde to discuss additional considerations regarding the auricle for this or future procedures. She cleans the ear daily with a variety of implements, as she has for many years.     Today, she reports the ear has been completely dry since we treated it with drops a few months ago.  She scheduled this appointment to discuss surgery.  She recently learned she is pregnant.    Review of Systems:   Pertinent items are noted in HPI or as in patient entered ROS below, remainder of complete ROS is negative.    ENT ROS 2022   Neurology: Headache   Ears, Nose, Throat: Hearing loss      Active Medications:     Current Outpatient Medications:      acetaminophen (TYLENOL) 500 MG tablet, Take 2 tablets (1,000 mg) by mouth 3 times daily as needed for mild pain (Patient not taking: Reported on 2022), Disp: 100 tablet, Rfl: 3     cetirizine (ZYRTEC) 10 MG tablet, Take 1 tablet (10 mg) by mouth daily (Patient not taking: Reported on 2022), Disp: 60 tablet, Rfl: 3     darifenacin ER (ENABLEX) 15 MG 24 hr tablet, 1 tablet with liquid (Patient not taking: Reported on 2022), Disp: , Rfl:      ketotifen (ZADITOR) 0.025 % ophthalmic solution, Place 1 drop into both eyes 2 times daily as needed for itching or dry eyes  "(Patient not taking: Reported on 7/6/2022), Disp: 10 mL, Rfl: 4     loperamide (IMODIUM A-D) 2 MG tablet, Take 1 tablet (2 mg) by mouth 3 times daily as needed for diarrhea (Patient not taking: Reported on 7/6/2022), Disp: 90 tablet, Rfl: 3     omeprazole (PRILOSEC) 40 MG DR capsule, Take 1 capsule (40 mg) by mouth daily (Patient not taking: Reported on 7/6/2022), Disp: 90 capsule, Rfl: 3     Prenatal Vit-Fe Fumarate-FA (PRENATAL MULTIVITAMIN W/IRON) 27-0.8 MG tablet, Take 1 tablet by mouth daily, Disp: 90 tablet, Rfl: 3     doxylamine (UNISOM) 25 MG TABS tablet, Take 1 tablet (25 mg) by mouth nightly as needed (nausea), Disp: 60 tablet, Rfl: 1     Prenatal Vit-Fe Sulfate-FA-DHA (PRENATAL VITAMIN/MIN +DHA) 27-0.8-200 MG CAPS, Take 1 tablet by mouth daily, Disp: 100 capsule, Rfl: 3     vitamin B6 (PYRIDOXINE) 50 MG TABS, Take 1 tablet (50 mg) by mouth daily, Disp: 90 tablet, Rfl: 3      Allergies:   Patient has no known allergies.      Past Medical History:  No past medical history on file.  Patient Active Problem List   Diagnosis     Urticaria     Congenital atresia of right external ear     Mixed hearing loss, bilateral     Microtia of right ear     Language barrier affecting health care     Urinary frequency     Antepartum multigravida of advanced maternal age      Past Surgical History:  No past surgical history on file.    Family History:   No family history on file.      Social History:   Social History     Tobacco Use     Smoking status: Never Smoker     Smokeless tobacco: Former User   Vaping Use     Vaping Use: Never used   Substance Use Topics     Alcohol use: Never     Drug use: Never      Physical Exam:   /87   Pulse 92   Temp 98  F (36.7  C)   Ht 1.499 m (4' 11\")   Wt 58.1 kg (128 lb)   BMI 25.85 kg/m       Constitutional:  The patient was accompanied by her daughter, well-groomed, and in no acute distress.     Skin: Normal:  warm and pink without rash   Neurologic: Alert and oriented x 3.  " CN's III-XII within normal limits.  Voice normal.    Psychiatric: The patient's affect was calm, cooperative, and appropriate.     Communication:  Normal; communicates verbally, normal voice quality.   Respiratory: Breathing comfortably without stridor or exertion of accessory muscles.    Eyes: Pupils were equal and reactive.  Extraocular movement intact.     Ears: Pinnae and tragus non-tender.  EAC's and TM's were evaluated, results below.        Otologic microscope exam:  Right ear: Microtia, small superior remnant, tag at the level of the lobule, and posterior to that is a small opening. This just admits a #7 suction at the opening. This is dry - no drainage. Beyond the opening, there is an ear canal.  The lining appears healthy today, but I cannot perceive the most medial aspect because it is too narrow.       Left ear: Grade I microtia. There is a through and through hole in the auricle posterior to the triangular fossa. Ear canal is patent. The TM is intact, but looks like it has either been repaired or remodeled from infections posteriorly.    Audiogram:  AUDIOGRAM: She underwent an audiogram at her initial visit earlier this year.   This demonstrated:   Right profound loss - question crossover of bone line given profound thresholds and reduction in word recognition  Left moderate mixed hearing loss        Right: Speech reception threshold is 115 dB with 32% word recognition at 115 dB. DNT Tympanogram  Left: Speech reception threshold is 45 dB with 88% word recognition at 75 dB. Tympanogram A type     Audiogram was independently reviewed.    Imaging:  CT TEMPORAL BONES WO CONTRAST:  (07/30/2021)                                                      IMPRESSION:  1.  Multiple abnormalities of the right ear including: Small soft tissue opening at the lateral end of the external auditory canal, lack of an identifiable tympanic membrane and atresia or erosion of the manubrium of the malleus and long delmer of the  incus with no definite articulation between the incus and stapes.  2.  Labyrinthine structures on the right are normal.  3.  Normal left temporal bone.    All imaging was independently reviewed.       Assessment and Plan:  Oli Mercedes is a 37 year old woman right congenital aural atresia and microtia.  She has been interested in surgery to open the meatus to avoid recurrent infection and to permit us to examine the canal and TM for signs of cholesteatoma.  She returned today to plan surgery.    She very recently learned she is expecting a child later this year.  I congratulated her and she is indeed excited about the pregnancy.  I explained that we cannot do surgery until after the baby is delivered.  As she is doing very well right now, it is very reasonable to manage the ear with observation and cleaning as needed and to meet again when scheduling would be appropriate.    Karla Light MD  Otology & Neurotology  [facility]

## 2022-07-13 LAB — SCANNED LAB RESULT: NORMAL

## 2022-08-03 ENCOUNTER — PRENATAL OFFICE VISIT (OUTPATIENT)
Dept: FAMILY MEDICINE | Facility: CLINIC | Age: 37
End: 2022-08-03
Payer: COMMERCIAL

## 2022-08-03 ENCOUNTER — TELEPHONE (OUTPATIENT)
Dept: FAMILY MEDICINE | Facility: CLINIC | Age: 37
End: 2022-08-03

## 2022-08-03 VITALS
HEART RATE: 104 BPM | TEMPERATURE: 98.3 F | SYSTOLIC BLOOD PRESSURE: 100 MMHG | WEIGHT: 129.5 LBS | BODY MASS INDEX: 26.11 KG/M2 | DIASTOLIC BLOOD PRESSURE: 70 MMHG | OXYGEN SATURATION: 97 % | HEIGHT: 59 IN | RESPIRATION RATE: 16 BRPM

## 2022-08-03 DIAGNOSIS — O09.529 ANTEPARTUM MULTIGRAVIDA OF ADVANCED MATERNAL AGE: Primary | ICD-10-CM

## 2022-08-03 PROCEDURE — 99207 PR COMPLICATED OB VISIT: CPT | Performed by: FAMILY MEDICINE

## 2022-08-03 NOTE — TELEPHONE ENCOUNTER
Pt called and relayed about transportation. Pt will find own way to WIC appt on Monday.  Thanks          ----- Message from Zelalem Vergara sent at 8/3/2022  3:03 PM CDT -----  Regarding: RE: Transportation  Transportation set up for august ten but not wic , pt doesn't have ride benefit for that thanks   ----- Message -----  From: Jayashree Velásquez  Sent: 8/3/2022  11:53 AM CDT  To: Zelalem Vergara  Subject: Transportation                                   Jose Scales,     Pt scheduled for OB US -fetal survey DEVIKA 1/6/23.  Wednesday August 10, 2022 at 1045 am arrival for 11 am appt.     [address]  [address]      Pt scheduled for WIC intake on Monday, August 8, 2022 at 1130 am     [facility]  [address] [place]  [address]    Thank you!

## 2022-08-03 NOTE — PROGRESS NOTES
"SUBJECTIVE:  Say Daren  at 17w5d by LMP consistent with 17 week ultrasound. Estimated Date of Delivery: 2023.  She is doing well. She no longer has nausea and vomiting. She denies abdominal pain/cramping, vaginal bleeding, leakage of fluid. Fetal movement is present.   Concerns: none. She has connected with [facility].  ROS  Negative except as noted above in HPI.  OBJECTIVE:  Blood pressure 100/70, pulse 104, temperature 98.3  F (36.8  C), temperature source Oral, resp. rate 16, height 1.499 m (4' 11\"), weight 58.7 kg (129 lb 8 oz), SpO2 97 %, not currently breastfeeding.  Gen: comfortable, no acute distress.  See OB Vitals flowsheet.  ASSESSMENT/PLAN:  Say was seen today for prenatal care.    Diagnoses and all orders for this visit:    Antepartum multigravida of advanced maternal age: ordered fetal survey  -     US OB > 14 Weeks; Future      -IUP at 17w5d:     Prenatal labs reviewed and normal.     cfDNA normal     Fetal survey ordered today.     Peripartum Anesthesia: not yet discussed    Post-partum Contraception: not yet discussed     Breast/Bottle: discuss next visit     RTC in 4 weeks or sooner with problems.    Visit completed along with assistance of Zainab .    Marianne Rodriguez MD    "

## 2022-08-10 ENCOUNTER — HOSPITAL ENCOUNTER (OUTPATIENT)
Dept: ULTRASOUND IMAGING | Facility: HOSPITAL | Age: 37
Discharge: HOME | End: 2022-08-10
Attending: FAMILY MEDICINE | Admitting: FAMILY MEDICINE
Payer: COMMERCIAL

## 2022-08-10 DIAGNOSIS — O09.529 ANTEPARTUM MULTIGRAVIDA OF ADVANCED MATERNAL AGE: ICD-10-CM

## 2022-08-10 PROCEDURE — 76805 OB US >/= 14 WKS SNGL FETUS: CPT

## 2022-08-31 ENCOUNTER — PRENATAL OFFICE VISIT (OUTPATIENT)
Dept: FAMILY MEDICINE | Facility: CLINIC | Age: 37
End: 2022-08-31
Payer: COMMERCIAL

## 2022-08-31 VITALS
WEIGHT: 129.75 LBS | HEIGHT: 59 IN | RESPIRATION RATE: 16 BRPM | BODY MASS INDEX: 26.16 KG/M2 | DIASTOLIC BLOOD PRESSURE: 62 MMHG | HEART RATE: 101 BPM | TEMPERATURE: 97.7 F | SYSTOLIC BLOOD PRESSURE: 98 MMHG | OXYGEN SATURATION: 100 %

## 2022-08-31 DIAGNOSIS — O43.199 MARGINAL INSERTION OF UMBILICAL CORD AFFECTING MANAGEMENT OF MOTHER: ICD-10-CM

## 2022-08-31 DIAGNOSIS — O09.529 ANTEPARTUM MULTIGRAVIDA OF ADVANCED MATERNAL AGE: Primary | ICD-10-CM

## 2022-08-31 DIAGNOSIS — R30.0 DYSURIA: ICD-10-CM

## 2022-08-31 LAB
ALBUMIN UR-MCNC: NEGATIVE MG/DL
APPEARANCE UR: CLEAR
BILIRUB UR QL STRIP: NEGATIVE
COLOR UR AUTO: YELLOW
GLUCOSE UR STRIP-MCNC: NEGATIVE MG/DL
HGB UR QL STRIP: NEGATIVE
KETONES UR STRIP-MCNC: NEGATIVE MG/DL
LEUKOCYTE ESTERASE UR QL STRIP: NEGATIVE
NITRATE UR QL: NEGATIVE
PH UR STRIP: 7 [PH] (ref 5–7)
SP GR UR STRIP: 1.01 (ref 1–1.03)
UROBILINOGEN UR STRIP-ACNC: 0.2 E.U./DL

## 2022-08-31 PROCEDURE — 87086 URINE CULTURE/COLONY COUNT: CPT | Performed by: FAMILY MEDICINE

## 2022-08-31 PROCEDURE — 81003 URINALYSIS AUTO W/O SCOPE: CPT | Performed by: FAMILY MEDICINE

## 2022-08-31 PROCEDURE — 99207 PR COMPLICATED OB VISIT: CPT | Performed by: FAMILY MEDICINE

## 2022-08-31 NOTE — PROGRESS NOTES
SUBJECTIVE:  Say Daren  at 21w5d by 14 week ultrasound. Estimated Date of Delivery: 2023.  She is doing well. The baby is moving a lot and she is having to urinate a lot. Sometimes she gets some lower abdominal cramping with urination.   No vaginal bleeding, leakage of fluid.     ROS  Negative except as noted above in HPI.  OBJECTIVE:  not currently breastfeeding.  Gen: comfortable, no acute distress.  See OB Vitals flowsheet.  ASSESSMENT/PLAN:  Diagnoses and all orders for this visit:    Antepartum multigravida of advanced maternal age    Marginal insertion of umbilical cord affecting management of mother: growth ultrasound at 28 weeks    Dysuria: will check to make sure urinary frequency and cramping do not represent UTI  -     UA Macro with Reflex to Micro and Culture - lab collect; Future  -     Urine Culture Aerobic Bacterial; Future    Other orders  -     REVIEW OF HEALTH MAINTENANCE PROTOCOL ORDERS      -IUP at 21w5d:     Prenatal labs reviewed and normal.     cfDNA came back normal.     Fetal survey was done at 18 weeks and was normal except for marginal cord insertion.     Breast/Bottle: breast     RTC in 4 weeks or sooner with problems.    Visit completed along with assistance of Zainab .    Marianne Rodriguez MD

## 2022-09-01 LAB — BACTERIA UR CULT: NORMAL

## 2022-09-28 ENCOUNTER — PRENATAL OFFICE VISIT (OUTPATIENT)
Dept: FAMILY MEDICINE | Facility: CLINIC | Age: 37
End: 2022-09-28
Payer: COMMERCIAL

## 2022-09-28 VITALS
OXYGEN SATURATION: 99 % | TEMPERATURE: 98.3 F | WEIGHT: 131.5 LBS | RESPIRATION RATE: 16 BRPM | SYSTOLIC BLOOD PRESSURE: 96 MMHG | HEART RATE: 99 BPM | BODY MASS INDEX: 26.51 KG/M2 | DIASTOLIC BLOOD PRESSURE: 68 MMHG | HEIGHT: 59 IN

## 2022-09-28 DIAGNOSIS — Z23 NEED FOR INFLUENZA VACCINATION: ICD-10-CM

## 2022-09-28 DIAGNOSIS — O09.529 ANTEPARTUM MULTIGRAVIDA OF ADVANCED MATERNAL AGE: Primary | ICD-10-CM

## 2022-09-28 LAB
GLUCOSE 1H P 50 G GLC PO SERPL-MCNC: 105 MG/DL (ref 70–129)
HGB BLD-MCNC: 11.8 G/DL (ref 11.7–15.7)
T PALLIDUM AB SER QL: NONREACTIVE

## 2022-09-28 PROCEDURE — 86780 TREPONEMA PALLIDUM: CPT | Performed by: FAMILY MEDICINE

## 2022-09-28 PROCEDURE — 36415 COLL VENOUS BLD VENIPUNCTURE: CPT | Performed by: FAMILY MEDICINE

## 2022-09-28 PROCEDURE — 90686 IIV4 VACC NO PRSV 0.5 ML IM: CPT | Performed by: FAMILY MEDICINE

## 2022-09-28 PROCEDURE — 90471 IMMUNIZATION ADMIN: CPT | Performed by: FAMILY MEDICINE

## 2022-09-28 PROCEDURE — 99207 PR COMPLICATED OB VISIT: CPT | Performed by: FAMILY MEDICINE

## 2022-09-28 PROCEDURE — 85018 HEMOGLOBIN: CPT | Performed by: FAMILY MEDICINE

## 2022-09-28 PROCEDURE — 82950 GLUCOSE TEST: CPT | Performed by: FAMILY MEDICINE

## 2022-09-28 NOTE — PROGRESS NOTES
"SUBJECTIVE:  Say Daren  at 25w5d by 14 week ultrasound. Estimated Date of Delivery: 2023.  She is doing well. She does not have nausea and vomiting. She denies abdominal pain/cramping, vaginal bleeding, leakage of fluid. Fetal movement is present.     ROS  Negative except as noted above in HPI.  OBJECTIVE:  Blood pressure 96/68, pulse 99, temperature 98.3  F (36.8  C), temperature source Oral, resp. rate 16, height 1.499 m (4' 11\"), weight 59.6 kg (131 lb 8 oz), SpO2 99 %, not currently breastfeeding.  Gen: comfortable, no acute distress.  See OB Vitals flowsheet.  ASSESSMENT/PLAN:  Say was seen today for prenatal care.    Diagnoses and all orders for this visit:    Antepartum multigravida of advanced maternal age  -     Glucose tolerance, gest screen, 1 hour  -     Treponema Abs w Reflex to RPR and Titer  -     Hemoglobin      -IUP at 25w5d:     Prenatal labs reviewed and normal.     cfDNA done, results came back normal.     Fetal survey was done at 21 weeks and was normal except for marginal cord insertion. Growth ultrasound after next visit.     GTT today.     Peripartum Anesthesia: the patient does not desire an epidural during labor.     Breast/Bottle: breast     RTC in 4 weeks or sooner with problems.    Visit completed along with assistance of Zainab .    Marianne Rodriguez MD    "

## 2022-10-26 ENCOUNTER — PRENATAL OFFICE VISIT (OUTPATIENT)
Dept: FAMILY MEDICINE | Facility: CLINIC | Age: 37
End: 2022-10-26
Payer: COMMERCIAL

## 2022-10-26 VITALS
BODY MASS INDEX: 26.96 KG/M2 | TEMPERATURE: 98.1 F | WEIGHT: 133.75 LBS | HEIGHT: 59 IN | OXYGEN SATURATION: 99 % | SYSTOLIC BLOOD PRESSURE: 102 MMHG | HEART RATE: 111 BPM | DIASTOLIC BLOOD PRESSURE: 70 MMHG | RESPIRATION RATE: 16 BRPM

## 2022-10-26 DIAGNOSIS — O43.199 MARGINAL INSERTION OF UMBILICAL CORD AFFECTING MANAGEMENT OF MOTHER: ICD-10-CM

## 2022-10-26 DIAGNOSIS — K59.01 SLOW TRANSIT CONSTIPATION: ICD-10-CM

## 2022-10-26 DIAGNOSIS — K64.4 EXTERNAL HEMORRHOIDS: ICD-10-CM

## 2022-10-26 DIAGNOSIS — O09.529 ANTEPARTUM MULTIGRAVIDA OF ADVANCED MATERNAL AGE: Primary | ICD-10-CM

## 2022-10-26 PROCEDURE — 99207 PR COMPLICATED OB VISIT: CPT | Performed by: FAMILY MEDICINE

## 2022-10-26 PROCEDURE — 90471 IMMUNIZATION ADMIN: CPT | Performed by: FAMILY MEDICINE

## 2022-10-26 PROCEDURE — 90715 TDAP VACCINE 7 YRS/> IM: CPT | Performed by: FAMILY MEDICINE

## 2022-10-26 RX ORDER — POLYETHYLENE GLYCOL 3350 17 G/17G
1 POWDER, FOR SOLUTION ORAL DAILY
Qty: 578 G | Refills: 1 | Status: SHIPPED | OUTPATIENT
Start: 2022-10-26 | End: 2023-02-06

## 2022-10-26 RX ORDER — LIDOCAINE HYDROCHLORIDE 20 MG/ML
JELLY TOPICAL PRN
Qty: 30 ML | Refills: 1 | Status: SHIPPED | OUTPATIENT
Start: 2022-10-26 | End: 2023-02-06

## 2022-10-26 NOTE — PROGRESS NOTES
Prior to immunization administration, verified patients identity using patient s name and date of birth. Please see Immunization Activity for additional information.     Screening Questionnaire for Adult Immunization    Are you sick today?   No   Do you have allergies to medications, food, a vaccine component or latex?   No   Have you ever had a serious reaction after receiving a vaccination?   No   Do you have a long-term health problem with heart, lung, kidney, or metabolic disease (e.g., diabetes), asthma, a blood disorder, no spleen, complement component deficiency, a cochlear implant, or a spinal fluid leak?  Are you on long-term aspirin therapy?   No   Do you have cancer, leukemia, HIV/AIDS, or any other immune system problem?   No   Do you have a parent, brother, or sister with an immune system problem?   No   In the past 3 months, have you taken medications that affect  your immune system, such as prednisone, other steroids, or anticancer drugs; drugs for the treatment of rheumatoid arthritis, Crohn s disease, or psoriasis; or have you had radiation treatments?   No   Have you had a seizure, or a brain or other nervous system problem?   No   During the past year, have you received a transfusion of blood or blood    products, or been given immune (gamma) globulin or antiviral drug?   No   For women: Are you pregnant or is there a chance you could become       pregnant during the next month?   Yes   Have you received any vaccinations in the past 4 weeks?   No     Immunization questionnaire was positive for at least one answer.    Screening performed by Sebastian Fuentes on 10/26/2022 at 10:16 AM.      SUBJECTIVE:  Say Daren  at 29w5d by 14 week ultrasound. Estimated Date of Delivery: 2023.  She is doing well. She denies vaginal bleeding, leakage of fluid, or urinary symptoms. Fetal movement is present. She is not having contractions.  Concerns: hemorrhoids. Bleeding and painful with bowel movements.  "  ROS  Negative except as noted above in HPI  OBJECTIVE:  Blood pressure 102/70, pulse 111, temperature 98.1  F (36.7  C), temperature source Oral, resp. rate 16, height 1.499 m (4' 11\"), weight 60.7 kg (133 lb 12 oz), SpO2 99 %, not currently breastfeeding.  Gen: Comfortable, no acute distress.  Abd: Gravid, non-tender  See OB Vitals flowsheet.  ASSESSMENT/PLAN:  Say was seen today for prenatal care.    Diagnoses and all orders for this visit:    Antepartum multigravida of advanced maternal age    Marginal insertion of umbilical cord affecting management of mother  -     TDAP (ADACEL,BOOSTRIX)  -     US OB > 14 Weeks; Future    External hemorrhoids: discussed keeping stool soft with Miralax and using lidocaine or prep H for pain  -     lidocaine (XYLOCAINE) 2 % external gel; Apply topically as needed for moderate pain  -     phenylephrine-shark liver oil-mineral oil-petrolatum (PREPARATION H) 0.25-3-14-71.9 % rectal ointment; Place rectally 2 times daily as needed for hemorrhoids    Slow transit constipation  -     polyethylene glycol (MIRALAX) 17 GM/Dose powder; Take 17 g (1 capful) by mouth daily      -IUP at 29w5d:     Prenatal labs reviewed and normal.     cfDNA ordered; results came back normal.      Fetal survey was done at 20 weeks and was normal     Peripartum Anesthesia: the patient does not desire an epidural during labor.     Post-partum Contraception: discuss at next visit    Breast/Bottle: Breast     Reviewed plans for getting to the hospital.    RTC in 2 weeks or sooner with problems.    Visit completed along with assistance of Zainab .  Marianne Rodriguez MD    "

## 2022-10-31 ENCOUNTER — HOSPITAL ENCOUNTER (OUTPATIENT)
Dept: ULTRASOUND IMAGING | Facility: HOSPITAL | Age: 37
Discharge: HOME | End: 2022-10-31
Attending: FAMILY MEDICINE | Admitting: FAMILY MEDICINE
Payer: COMMERCIAL

## 2022-10-31 DIAGNOSIS — O43.199 MARGINAL INSERTION OF UMBILICAL CORD AFFECTING MANAGEMENT OF MOTHER: ICD-10-CM

## 2022-10-31 PROCEDURE — 76816 OB US FOLLOW-UP PER FETUS: CPT

## 2022-11-09 ENCOUNTER — PRENATAL OFFICE VISIT (OUTPATIENT)
Dept: FAMILY MEDICINE | Facility: CLINIC | Age: 37
End: 2022-11-09
Payer: COMMERCIAL

## 2022-11-09 VITALS
RESPIRATION RATE: 16 BRPM | TEMPERATURE: 97.6 F | WEIGHT: 134 LBS | HEART RATE: 96 BPM | BODY MASS INDEX: 27.01 KG/M2 | HEIGHT: 59 IN | DIASTOLIC BLOOD PRESSURE: 66 MMHG | SYSTOLIC BLOOD PRESSURE: 92 MMHG | OXYGEN SATURATION: 98 %

## 2022-11-09 DIAGNOSIS — O09.529 ANTEPARTUM MULTIGRAVIDA OF ADVANCED MATERNAL AGE: Primary | ICD-10-CM

## 2022-11-09 DIAGNOSIS — O43.199 MARGINAL INSERTION OF UMBILICAL CORD AFFECTING MANAGEMENT OF MOTHER: ICD-10-CM

## 2022-11-09 PROCEDURE — 99207 PR PRENATAL VISIT: CPT | Performed by: FAMILY MEDICINE

## 2022-11-09 NOTE — PROGRESS NOTES
"SUBJECTIVE:  Say Daren  at 31w5d by 14 week ultrasound. Estimated Date of Delivery: 2023.  She is doing well. She denies vaginal bleeding, leakage of fluid, or urinary symptoms. Fetal movement is present. She is not having contractions.  Concerns: none  ROS  Negative except as noted above in HPI  OBJECTIVE:  Blood pressure 92/66, pulse 96, temperature 97.6  F (36.4  C), temperature source Oral, resp. rate 16, height 1.499 m (4' 11\"), weight 60.8 kg (134 lb), SpO2 98 %, not currently breastfeeding.  Gen: Comfortable, no acute distress.  Abd: Gravid, non-tender  See OB Vitals flowsheet.  ASSESSMENT/PLAN:  Say was seen today for prenatal care.    Diagnoses and all orders for this visit:    Antepartum multigravida of advanced maternal age    Marginal insertion of umbilical cord affecting management of mother: last growth ultrasound was in 48th %ile        Visit completed along with assistance of Zainab .  Marianne Rodriguez MD    "

## 2022-11-23 ENCOUNTER — PRENATAL OFFICE VISIT (OUTPATIENT)
Dept: FAMILY MEDICINE | Facility: CLINIC | Age: 37
End: 2022-11-23
Payer: COMMERCIAL

## 2022-11-23 VITALS
DIASTOLIC BLOOD PRESSURE: 64 MMHG | HEART RATE: 110 BPM | SYSTOLIC BLOOD PRESSURE: 90 MMHG | RESPIRATION RATE: 20 BRPM | BODY MASS INDEX: 27.42 KG/M2 | TEMPERATURE: 97.6 F | WEIGHT: 136 LBS | HEIGHT: 59 IN | OXYGEN SATURATION: 98 %

## 2022-11-23 DIAGNOSIS — K64.4 EXTERNAL HEMORRHOIDS: ICD-10-CM

## 2022-11-23 DIAGNOSIS — K92.1 BLOOD IN STOOL: ICD-10-CM

## 2022-11-23 DIAGNOSIS — K59.01 SLOW TRANSIT CONSTIPATION: ICD-10-CM

## 2022-11-23 DIAGNOSIS — Z34.93 ENCOUNTER FOR SUPERVISION OF NORMAL PREGNANCY IN THIRD TRIMESTER, UNSPECIFIED GRAVIDITY: Primary | ICD-10-CM

## 2022-11-23 PROCEDURE — 99207 PR PRENATAL VISIT: CPT | Performed by: FAMILY MEDICINE

## 2022-11-23 NOTE — PROGRESS NOTES
"SUBJECTIVE:  Say Daren  at 33w5d by 14 week ultrasound. Estimated Date of Delivery: 2023.  She is doing well. She denies vaginal bleeding, leakage of fluid, or urinary symptoms. Fetal movement is present. She is not having contractions.  Concerns: having some blood in stool. Her stool is not hard and she is taking Miralax daily but she has a history of constipation. No pain with bowel movements.   ROS  Negative except as noted above in HPI  OBJECTIVE:  Blood pressure 90/64, pulse 110, temperature 97.6  F (36.4  C), temperature source Oral, resp. rate 20, height 1.499 m (4' 11\"), weight 61.7 kg (136 lb), SpO2 98 %, not currently breastfeeding.  Gen: Comfortable, no acute distress.  Abd: Gravid, non-tender  See OB Vitals flowsheet.  ASSESSMENT/PLAN:  Say was seen today for prenatal care.    Diagnoses and all orders for this visit:    Encounter for supervision of normal pregnancy in third trimester, unspecified     Slow transit constipation: discussed continuing miralax to prevent constipation    External hemorrhoids: monitor, may have continued bleeding. Discussed possible hemorrhoidectomy after postpartum period if hemorrhoids are still bothersome.     Blood in stool      -IUP at 33w5d:     Prenatal labs reviewed and normal.     cfDNA done; results came back normal.      Fetal survey was done at 20 weeks and was normal except for marginal cord insertion.      Peripartum Anesthesia: the patient does not desire an epidural during labor.     Post-partum Contraception: pills or Depo.      Breast/Bottle: Breast     Reviewed plans for getting to the hospital.    RTC in 2 weeks or sooner with problems.    Visit completed along with assistance of Zainab .  Marianne Rodriguez MD    "

## 2022-12-07 ENCOUNTER — PRENATAL OFFICE VISIT (OUTPATIENT)
Dept: FAMILY MEDICINE | Facility: CLINIC | Age: 37
End: 2022-12-07
Payer: COMMERCIAL

## 2022-12-07 VITALS
DIASTOLIC BLOOD PRESSURE: 76 MMHG | TEMPERATURE: 98.4 F | RESPIRATION RATE: 18 BRPM | HEIGHT: 59 IN | HEART RATE: 113 BPM | BODY MASS INDEX: 27.21 KG/M2 | SYSTOLIC BLOOD PRESSURE: 106 MMHG | WEIGHT: 135 LBS | OXYGEN SATURATION: 99 %

## 2022-12-07 DIAGNOSIS — O09.529 ANTEPARTUM MULTIGRAVIDA OF ADVANCED MATERNAL AGE: Primary | ICD-10-CM

## 2022-12-07 PROCEDURE — 87653 STREP B DNA AMP PROBE: CPT | Performed by: FAMILY MEDICINE

## 2022-12-07 PROCEDURE — 99207 PR COMPLICATED OB VISIT: CPT | Performed by: FAMILY MEDICINE

## 2022-12-07 NOTE — PROGRESS NOTES
"SUBJECTIVE:  Say Daren  at 35w5d by early ultrasound. Estimated Date of Delivery: 2023.  She is doing well. She denies vaginal bleeding, leakage of fluid, or urinary symptoms. Fetal movement is present. She is not having contractions.  Concerns: none  ROS  Negative except as noted above in HPI  OBJECTIVE:  Blood pressure 106/76, pulse 113, temperature 98.4  F (36.9  C), temperature source Oral, resp. rate 18, height 1.499 m (4' 11.02\"), weight 61.2 kg (135 lb), SpO2 99 %, not currently breastfeeding.  Gen: Comfortable, no acute distress.  Abd: Gravid, non-tender  See OB Vitals flowsheet.  ASSESSMENT/PLAN:  Say was seen today for prenatal care.    Diagnoses and all orders for this visit:    Antepartum multigravida of advanced maternal age      -IUP at 35w5d:     Prenatal labs reviewed and normal.     cfDNA done; results came back normal.      Fetal survey was done at 20 weeks and was normal.     GBS swab today.     Peripartum Anesthesia: the patient does not desire an epidural during labor.     Post-partum Contraception: depo or pills      Breast/Bottle: Breast     Reviewed plans for getting to the hospital. Gave hospital sheet in case she needs to call an ambulance if  is at work.    RTC in 1 week or sooner with problems.    Visit completed along with assistance of Zainab .  Marianne Rodriguez MD    "

## 2022-12-08 LAB — GP B STREP DNA SPEC QL NAA+PROBE: NEGATIVE

## 2022-12-14 ENCOUNTER — PRENATAL OFFICE VISIT (OUTPATIENT)
Dept: FAMILY MEDICINE | Facility: CLINIC | Age: 37
End: 2022-12-14
Payer: COMMERCIAL

## 2022-12-14 VITALS
TEMPERATURE: 97.9 F | DIASTOLIC BLOOD PRESSURE: 86 MMHG | WEIGHT: 135.5 LBS | HEART RATE: 118 BPM | BODY MASS INDEX: 27.32 KG/M2 | SYSTOLIC BLOOD PRESSURE: 118 MMHG | OXYGEN SATURATION: 98 % | HEIGHT: 59 IN | RESPIRATION RATE: 20 BRPM

## 2022-12-14 DIAGNOSIS — O09.529 ANTEPARTUM MULTIGRAVIDA OF ADVANCED MATERNAL AGE: Primary | ICD-10-CM

## 2022-12-14 PROCEDURE — 99207 PR COMPLICATED OB VISIT: CPT | Performed by: FAMILY MEDICINE

## 2022-12-14 NOTE — PROGRESS NOTES
"SUBJECTIVE:  Say Daren  at 36w5d by 14 week ultrasound. Estimated Date of Delivery: 2023.  She is doing well. She denies vaginal bleeding, leakage of fluid, or urinary symptoms. Fetal movement is present. She is not having contractions.  Concerns: none  ROS  Negative except as noted above in HPI  OBJECTIVE:  Blood pressure 118/86, pulse 118, temperature 97.9  F (36.6  C), temperature source Oral, resp. rate 20, height 1.499 m (4' 11.02\"), weight 61.5 kg (135 lb 8 oz), SpO2 98 %, not currently breastfeeding.  Gen: Comfortable, no acute distress.  Abd: Gravid, non-tender  See OB Vitals flowsheet.  ASSESSMENT/PLAN:  Say was seen today for prenatal care.    Diagnoses and all orders for this visit:    Antepartum multigravida of advanced maternal age      -IUP at 36w5d:     Prenatal labs reviewed and normal.     cfDNA normal.      Fetal survey was done at 20 weeks and was normal.     GBS status is negative.     Peripartum Anesthesia: the patient does not desire an epidural during labor.     Post-partum Contraception: depo or pills    Breast/Bottle: Breast     Reviewed plans for getting to the hospital.    RTC in 1 week or sooner with problems.    Visit completed along with assistance of Zainab .  Marianne Rodriguez MD    "

## 2022-12-21 ENCOUNTER — PRENATAL OFFICE VISIT (OUTPATIENT)
Dept: FAMILY MEDICINE | Facility: CLINIC | Age: 37
End: 2022-12-21
Payer: COMMERCIAL

## 2022-12-21 VITALS
HEART RATE: 100 BPM | WEIGHT: 136 LBS | DIASTOLIC BLOOD PRESSURE: 76 MMHG | BODY MASS INDEX: 27.42 KG/M2 | OXYGEN SATURATION: 99 % | HEIGHT: 59 IN | TEMPERATURE: 97.7 F | SYSTOLIC BLOOD PRESSURE: 104 MMHG | RESPIRATION RATE: 16 BRPM

## 2022-12-21 DIAGNOSIS — O09.529 ANTEPARTUM MULTIGRAVIDA OF ADVANCED MATERNAL AGE: Primary | ICD-10-CM

## 2022-12-21 PROBLEM — R35.0 URINARY FREQUENCY: Status: RESOLVED | Noted: 2022-04-27 | Resolved: 2022-12-21

## 2022-12-21 PROCEDURE — 99207 PR COMPLICATED OB VISIT: CPT | Performed by: FAMILY MEDICINE

## 2022-12-21 NOTE — PROGRESS NOTES
"SUBJECTIVE:  Say Daren  at 37w5d by 14 week ultrasound. Estimated Date of Delivery: 2023.  She is doing well. She denies vaginal bleeding, leakage of fluid, or urinary symptoms. Fetal movement is present. She is having some contractions.  Concerns: interested in  care.   ROS  Negative except as noted above in HPI  OBJECTIVE:  Blood pressure 104/76, pulse 100, temperature 97.7  F (36.5  C), temperature source Oral, resp. rate 16, height 1.499 m (4' 11.02\"), weight 61.7 kg (136 lb), SpO2 99 %, not currently breastfeeding.  Gen: Comfortable, no acute distress.  Abd: Gravid, non-tender  See OB Vitals flowsheet.  ASSESSMENT/PLAN:  Say was seen today for prenatal care.    Diagnoses and all orders for this visit:    Antepartum multigravida of advanced maternal age      -IUP at 37w5d:     Prenatal labs reviewed and present.     cfDNA done; results came back normal.      Fetal survey was done at 20 weeks and was normal.     GBS status is negative.     Peripartum Anesthesia: the patient does not desire an epidural during labor.     Post-partum Contraception: pills or Depo     Breast/Bottle: Breast     Reviewed plans for getting to the hospital.    RTC in 1 week or sooner with problems.    Visit completed along with assistance of Zainab .  Marianne Rodriguez MD    "

## 2022-12-26 ENCOUNTER — PRENATAL OFFICE VISIT (OUTPATIENT)
Dept: FAMILY MEDICINE | Facility: CLINIC | Age: 37
End: 2022-12-26
Payer: COMMERCIAL

## 2022-12-26 VITALS
TEMPERATURE: 98.6 F | DIASTOLIC BLOOD PRESSURE: 82 MMHG | HEART RATE: 89 BPM | BODY MASS INDEX: 27.52 KG/M2 | HEIGHT: 59 IN | SYSTOLIC BLOOD PRESSURE: 112 MMHG | OXYGEN SATURATION: 99 % | RESPIRATION RATE: 16 BRPM | WEIGHT: 136.5 LBS

## 2022-12-26 DIAGNOSIS — O09.529 ANTEPARTUM MULTIGRAVIDA OF ADVANCED MATERNAL AGE: Primary | ICD-10-CM

## 2022-12-26 PROCEDURE — 99207 PR COMPLICATED OB VISIT: CPT | Performed by: FAMILY MEDICINE

## 2022-12-26 NOTE — PROGRESS NOTES
"SUBJECTIVE:  Say Daren  at 38w3d by 14 week utlrasound. Estimated Date of Delivery: 2023.  She is doing well. She denies vaginal bleeding, leakage of fluid, or urinary symptoms. Fetal movement is present. She is having contractions periodically.  Concerns: pain in stomach started at 5 AM. Not sure if it's contractions because it's more constant.  Having a hard time urinating and having a bowel movement. The last time she was able to urinate was this morning. She IS able to go, but just a little bit at a time.   ROS  Negative except as noted above in HPI  OBJECTIVE:  Blood pressure 112/82, pulse 89, temperature 98.6  F (37  C), temperature source Oral, resp. rate 16, height 1.499 m (4' 11.02\"), weight 61.9 kg (136 lb 8 oz), SpO2 99 %, not currently breastfeeding.  Gen: Comfortable, no acute distress.  Abd: Gravid, non-tender  See OB Vitals flowsheet.  ASSESSMENT/PLAN:  Say was seen today for prenatal care.    Diagnoses and all orders for this visit:    Antepartum multigravida of advanced maternal age      -IUP at 38w3d:     Prenatal labs reviewed and present.      Fetal survey was done at 20 weeks and was normal.     GBS status is negative.     Peripartum Anesthesia: the patient does nto desire an epidural during labor.     Post-partum Contraception: pills or depo     Breast/Bottle: Breast     Reviewed plans for getting to the hospital.    RTC in 1 week or sooner with problems.    Visit completed along with assistance of Zainab .  Marianne Rodriguez MD    "

## 2022-12-31 ENCOUNTER — MEDICAL CORRESPONDENCE (OUTPATIENT)
Dept: HEALTH INFORMATION MANAGEMENT | Facility: CLINIC | Age: 37
End: 2022-12-31

## 2022-12-31 ENCOUNTER — HOSPITAL ENCOUNTER (INPATIENT)
Facility: HOSPITAL | Age: 37
LOS: 2 days | Discharge: HOME HEALTH | End: 2023-01-02
Attending: FAMILY MEDICINE | Admitting: FAMILY MEDICINE
Payer: COMMERCIAL

## 2022-12-31 DIAGNOSIS — R30.0 DYSURIA: ICD-10-CM

## 2022-12-31 DIAGNOSIS — Z30.011 BCP (BIRTH CONTROL PILLS) INITIATION: ICD-10-CM

## 2022-12-31 PROBLEM — Z37.9 NORMAL LABOR: Status: RESOLVED | Noted: 2022-12-31 | Resolved: 2022-12-31

## 2022-12-31 PROBLEM — Z37.9 NORMAL LABOR: Status: ACTIVE | Noted: 2022-12-31

## 2022-12-31 LAB
ABO/RH(D): NORMAL
ANTIBODY SCREEN: NEGATIVE
HOLD SPECIMEN: NORMAL
SPECIMEN EXPIRATION DATE: NORMAL

## 2022-12-31 PROCEDURE — 722N000001 HC LABOR CARE VAGINAL DELIVERY SINGLE

## 2022-12-31 PROCEDURE — 250N000011 HC RX IP 250 OP 636: Performed by: FAMILY MEDICINE

## 2022-12-31 PROCEDURE — 120N000001 HC R&B MED SURG/OB

## 2022-12-31 PROCEDURE — 86780 TREPONEMA PALLIDUM: CPT | Performed by: FAMILY MEDICINE

## 2022-12-31 PROCEDURE — 59400 OBSTETRICAL CARE: CPT | Performed by: FAMILY MEDICINE

## 2022-12-31 PROCEDURE — 36415 COLL VENOUS BLD VENIPUNCTURE: CPT | Performed by: FAMILY MEDICINE

## 2022-12-31 PROCEDURE — 86901 BLOOD TYPING SEROLOGIC RH(D): CPT | Performed by: FAMILY MEDICINE

## 2022-12-31 RX ORDER — OXYTOCIN 10 [USP'U]/ML
10 INJECTION, SOLUTION INTRAMUSCULAR; INTRAVENOUS
Status: DISCONTINUED | OUTPATIENT
Start: 2022-12-31 | End: 2022-12-31 | Stop reason: HOSPADM

## 2022-12-31 RX ORDER — KETOROLAC TROMETHAMINE 30 MG/ML
30 INJECTION, SOLUTION INTRAMUSCULAR; INTRAVENOUS
Status: DISCONTINUED | OUTPATIENT
Start: 2022-12-31 | End: 2023-01-02 | Stop reason: HOSPADM

## 2022-12-31 RX ORDER — OXYTOCIN 10 [USP'U]/ML
10 INJECTION, SOLUTION INTRAMUSCULAR; INTRAVENOUS
Status: DISCONTINUED | OUTPATIENT
Start: 2022-12-31 | End: 2023-01-02 | Stop reason: HOSPADM

## 2022-12-31 RX ORDER — ACETAMINOPHEN 325 MG/1
650 TABLET ORAL EVERY 4 HOURS PRN
Status: DISCONTINUED | OUTPATIENT
Start: 2022-12-31 | End: 2022-12-31 | Stop reason: HOSPADM

## 2022-12-31 RX ORDER — MISOPROSTOL 200 UG/1
400 TABLET ORAL
Status: DISCONTINUED | OUTPATIENT
Start: 2022-12-31 | End: 2023-01-02 | Stop reason: HOSPADM

## 2022-12-31 RX ORDER — PROCHLORPERAZINE MALEATE 10 MG
10 TABLET ORAL EVERY 6 HOURS PRN
Status: DISCONTINUED | OUTPATIENT
Start: 2022-12-31 | End: 2022-12-31 | Stop reason: HOSPADM

## 2022-12-31 RX ORDER — ACETAMINOPHEN 325 MG/1
650 TABLET ORAL EVERY 4 HOURS PRN
Status: DISCONTINUED | OUTPATIENT
Start: 2022-12-31 | End: 2023-01-02 | Stop reason: HOSPADM

## 2022-12-31 RX ORDER — DOCUSATE SODIUM 100 MG/1
100 CAPSULE, LIQUID FILLED ORAL DAILY
Status: DISCONTINUED | OUTPATIENT
Start: 2022-12-31 | End: 2023-01-02 | Stop reason: HOSPADM

## 2022-12-31 RX ORDER — METOCLOPRAMIDE 10 MG/1
10 TABLET ORAL EVERY 6 HOURS PRN
Status: DISCONTINUED | OUTPATIENT
Start: 2022-12-31 | End: 2022-12-31 | Stop reason: HOSPADM

## 2022-12-31 RX ORDER — CARBOPROST TROMETHAMINE 250 UG/ML
250 INJECTION, SOLUTION INTRAMUSCULAR
Status: DISCONTINUED | OUTPATIENT
Start: 2022-12-31 | End: 2022-12-31 | Stop reason: HOSPADM

## 2022-12-31 RX ORDER — HYDROCORTISONE 25 MG/G
CREAM TOPICAL 3 TIMES DAILY PRN
Status: DISCONTINUED | OUTPATIENT
Start: 2022-12-31 | End: 2023-01-02 | Stop reason: HOSPADM

## 2022-12-31 RX ORDER — IBUPROFEN 800 MG/1
800 TABLET, FILM COATED ORAL
Status: DISCONTINUED | OUTPATIENT
Start: 2022-12-31 | End: 2023-01-02 | Stop reason: HOSPADM

## 2022-12-31 RX ORDER — ONDANSETRON 2 MG/ML
4 INJECTION INTRAMUSCULAR; INTRAVENOUS EVERY 6 HOURS PRN
Status: DISCONTINUED | OUTPATIENT
Start: 2022-12-31 | End: 2022-12-31 | Stop reason: HOSPADM

## 2022-12-31 RX ORDER — METHYLERGONOVINE MALEATE 0.2 MG/ML
200 INJECTION INTRAVENOUS
Status: DISCONTINUED | OUTPATIENT
Start: 2022-12-31 | End: 2023-01-02 | Stop reason: HOSPADM

## 2022-12-31 RX ORDER — PROCHLORPERAZINE 25 MG
25 SUPPOSITORY, RECTAL RECTAL EVERY 12 HOURS PRN
Status: DISCONTINUED | OUTPATIENT
Start: 2022-12-31 | End: 2022-12-31 | Stop reason: HOSPADM

## 2022-12-31 RX ORDER — MISOPROSTOL 200 UG/1
400 TABLET ORAL
Status: DISCONTINUED | OUTPATIENT
Start: 2022-12-31 | End: 2022-12-31 | Stop reason: HOSPADM

## 2022-12-31 RX ORDER — NALOXONE HYDROCHLORIDE 0.4 MG/ML
0.2 INJECTION, SOLUTION INTRAMUSCULAR; INTRAVENOUS; SUBCUTANEOUS
Status: DISCONTINUED | OUTPATIENT
Start: 2022-12-31 | End: 2022-12-31 | Stop reason: HOSPADM

## 2022-12-31 RX ORDER — METOCLOPRAMIDE HYDROCHLORIDE 5 MG/ML
10 INJECTION INTRAMUSCULAR; INTRAVENOUS EVERY 6 HOURS PRN
Status: DISCONTINUED | OUTPATIENT
Start: 2022-12-31 | End: 2022-12-31 | Stop reason: HOSPADM

## 2022-12-31 RX ORDER — METHYLERGONOVINE MALEATE 0.2 MG/ML
200 INJECTION INTRAVENOUS
Status: DISCONTINUED | OUTPATIENT
Start: 2022-12-31 | End: 2022-12-31 | Stop reason: HOSPADM

## 2022-12-31 RX ORDER — OXYTOCIN/0.9 % SODIUM CHLORIDE 30/500 ML
340 PLASTIC BAG, INJECTION (ML) INTRAVENOUS CONTINUOUS PRN
Status: DISCONTINUED | OUTPATIENT
Start: 2022-12-31 | End: 2022-12-31 | Stop reason: HOSPADM

## 2022-12-31 RX ORDER — NALOXONE HYDROCHLORIDE 0.4 MG/ML
0.4 INJECTION, SOLUTION INTRAMUSCULAR; INTRAVENOUS; SUBCUTANEOUS
Status: DISCONTINUED | OUTPATIENT
Start: 2022-12-31 | End: 2022-12-31 | Stop reason: HOSPADM

## 2022-12-31 RX ORDER — LIDOCAINE 40 MG/G
CREAM TOPICAL
Status: DISCONTINUED | OUTPATIENT
Start: 2022-12-31 | End: 2022-12-31 | Stop reason: HOSPADM

## 2022-12-31 RX ORDER — MODIFIED LANOLIN
OINTMENT (GRAM) TOPICAL
Status: DISCONTINUED | OUTPATIENT
Start: 2022-12-31 | End: 2023-01-02 | Stop reason: HOSPADM

## 2022-12-31 RX ORDER — OXYTOCIN/0.9 % SODIUM CHLORIDE 30/500 ML
100-340 PLASTIC BAG, INJECTION (ML) INTRAVENOUS CONTINUOUS PRN
Status: DISCONTINUED | OUTPATIENT
Start: 2022-12-31 | End: 2023-01-02 | Stop reason: HOSPADM

## 2022-12-31 RX ORDER — MISOPROSTOL 200 UG/1
800 TABLET ORAL
Status: DISCONTINUED | OUTPATIENT
Start: 2022-12-31 | End: 2022-12-31 | Stop reason: HOSPADM

## 2022-12-31 RX ORDER — ONDANSETRON 4 MG/1
4 TABLET, ORALLY DISINTEGRATING ORAL EVERY 6 HOURS PRN
Status: DISCONTINUED | OUTPATIENT
Start: 2022-12-31 | End: 2022-12-31 | Stop reason: HOSPADM

## 2022-12-31 RX ORDER — BISACODYL 10 MG
10 SUPPOSITORY, RECTAL RECTAL DAILY PRN
Status: DISCONTINUED | OUTPATIENT
Start: 2022-12-31 | End: 2023-01-02 | Stop reason: HOSPADM

## 2022-12-31 RX ORDER — CITRIC ACID/SODIUM CITRATE 334-500MG
30 SOLUTION, ORAL ORAL
Status: DISCONTINUED | OUTPATIENT
Start: 2022-12-31 | End: 2022-12-31 | Stop reason: HOSPADM

## 2022-12-31 RX ORDER — IBUPROFEN 800 MG/1
800 TABLET, FILM COATED ORAL EVERY 6 HOURS PRN
Status: DISCONTINUED | OUTPATIENT
Start: 2022-12-31 | End: 2023-01-02 | Stop reason: HOSPADM

## 2022-12-31 RX ORDER — OXYTOCIN/0.9 % SODIUM CHLORIDE 30/500 ML
340 PLASTIC BAG, INJECTION (ML) INTRAVENOUS CONTINUOUS PRN
Status: DISCONTINUED | OUTPATIENT
Start: 2022-12-31 | End: 2023-01-02 | Stop reason: HOSPADM

## 2022-12-31 RX ORDER — MISOPROSTOL 200 UG/1
800 TABLET ORAL
Status: DISCONTINUED | OUTPATIENT
Start: 2022-12-31 | End: 2023-01-02 | Stop reason: HOSPADM

## 2022-12-31 RX ORDER — CARBOPROST TROMETHAMINE 250 UG/ML
250 INJECTION, SOLUTION INTRAMUSCULAR
Status: DISCONTINUED | OUTPATIENT
Start: 2022-12-31 | End: 2023-01-02 | Stop reason: HOSPADM

## 2022-12-31 RX ADMIN — KETOROLAC TROMETHAMINE 30 MG: 30 INJECTION, SOLUTION INTRAMUSCULAR; INTRAVENOUS at 18:09

## 2022-12-31 ASSESSMENT — ACTIVITIES OF DAILY LIVING (ADL)
TOILETING_ISSUES: NO
TOILETING_ISSUES: NO
DOING_ERRANDS_INDEPENDENTLY_DIFFICULTY: NO
FALL_HISTORY_WITHIN_LAST_SIX_MONTHS: NO
CHANGE_IN_FUNCTIONAL_STATUS_SINCE_ONSET_OF_CURRENT_ILLNESS/INJURY: NO
ADLS_ACUITY_SCORE: 18
WALKING_OR_CLIMBING_STAIRS_DIFFICULTY: NO
CONCENTRATING,_REMEMBERING_OR_MAKING_DECISIONS_DIFFICULTY: NO
DIFFICULTY_COMMUNICATING: NO
DIFFICULTY_COMMUNICATING: NO
DIFFICULTY_EATING/SWALLOWING: NO
ADLS_ACUITY_SCORE: 18
WEAR_GLASSES_OR_BLIND: NO
ADLS_ACUITY_SCORE: 18
WALKING_OR_CLIMBING_STAIRS_DIFFICULTY: NO
DRESSING/BATHING_DIFFICULTY: NO
WEAR_GLASSES_OR_BLIND: NO
DIFFICULTY_EATING/SWALLOWING: NO
ADLS_ACUITY_SCORE: 31
CHANGE_IN_FUNCTIONAL_STATUS_SINCE_ONSET_OF_CURRENT_ILLNESS/INJURY: NO
CONCENTRATING,_REMEMBERING_OR_MAKING_DECISIONS_DIFFICULTY: NO
DOING_ERRANDS_INDEPENDENTLY_DIFFICULTY: NO
FALL_HISTORY_WITHIN_LAST_SIX_MONTHS: NO
HEARING_DIFFICULTY_OR_DEAF: NO

## 2022-12-31 NOTE — PROGRESS NOTES
JOSE ALBERTO as noted. With the aide of  I discussed the plan of care with Say Daren and her family. We will use intermittent monitoring for now. Say Daren denies any desire for pain medication. We will plan for vaginal delivery.

## 2022-12-31 NOTE — H&P
Maternal Admission H&P  [facility]  Date of Admission: 2022  Date of Service: 2022    Name      Oli Mercedes         1985  MRN       [id number]  PCP        Prashanth Johnson at [facility], [phone].  Dr Rodriguez for prenatal care  ________________________________________________________________________    Assessment and Plan:  37 year old  at 39w1d.    Baby AGA. Anticipate .    Group B strep: neg    ________________________________________________________________________    Chief Complaint: regular strong contractions     HPI: Oli Mercedes is a 37 year old woman at 39w1d, based on 14wk us with Estimated Date of Delivery: 2023. Patient presents to L&D with contractions since 0600 on day of admission getting stronger as the day went on.  No LOF or bleeding.  Noting regular fetal movement.  No headaches and otherwise feeling well.       Prenatal Course:  She presented to [facility] at 14 weeks gestation for regular prenatal care.  Total weight gain 2.495 kg (5 lb 8 oz).    Prenatal complications included marginal cord insertion, ama with normal cfdna, last baby 6 years ago      Vaccinations in pregnancy included flu and adacel.  defering covid booster until after delivery     Patient Active Problem List    Diagnosis Date Noted     Normal labor 2022     Priority: Medium     External hemorrhoids 10/26/2022     Priority: Medium     Slow transit constipation 10/26/2022     Priority: Medium     Marginal insertion of umbilical cord affecting management of mother 2022     Priority: Medium     Antepartum multigravida of advanced maternal age 2022     Priority: Medium     Language barrier affecting health care 2021     Priority: Medium     Microtia of right ear 2021     Priority: Medium     Congenital atresia of right external ear 2021     Priority: Medium     Mixed hearing loss,  bilateral 2021     Priority: Medium     Urticaria 2021     Priority: Medium      OB History    Para Term  AB Living   4 3 3 0 0 3   SAB IAB Ectopic Multiple Live Births   0 0 0 0 0      # Outcome Date GA Lbr Oseas/2nd Weight Sex Delivery Anes PTL Lv   4 Current            3 Term 2016           2 Term 2012           1 Term              Review of Systems Negative except what is noted in HPI.   Past Medical History:   Diagnosis Date     Migraines      Urinary tract infection       History reviewed. No pertinent surgical history.Patient has no known allergies.  Family History   Problem Relation Age of Onset     Hypertension Mother      Diabetes Mother      Hypertension Father      No Known Problems Brother      No Known Problems Sister      No Known Problems Son      No Known Problems Daughter      No Known Problems Daughter      Social History     Socioeconomic History     Marital status:      Spouse name: Not on file     Number of children: 3     Years of education: 0     Highest education level: Never attended school   Occupational History     Not on file   Tobacco Use     Smoking status: Never     Smokeless tobacco: Former   Vaping Use     Vaping Use: Never used   Substance and Sexual Activity     Alcohol use: Never     Drug use: Never     Sexual activity: Yes     Partners: Male   Other Topics Concern     Not on file   Social History Narrative    Her children are ages 9, 8, and 3 (as of 2020).      Social Determinants of Health     Financial Resource Strain: Low Risk      Difficulty of Paying Living Expenses: Not very hard   Food Insecurity: No Food Insecurity     Worried About Running Out of Food in the Last Year: Never true     Ran Out of Food in the Last Year: Never true   Transportation Needs: Unmet Transportation Needs     Lack of Transportation (Medical): Yes     Lack of Transportation (Non-Medical): Yes   Physical Activity: Not on file   Stress: Not on file   Social  "Connections: Not on file   Intimate Partner Violence: Not At Risk     Fear of Current or Ex-Partner: No     Emotionally Abused: No     Physically Abused: No     Sexually Abused: No   Housing Stability: Not on file     Prior to Admission Medication List  Medications Prior to Admission   Medication Sig Dispense Refill Last Dose     lidocaine (XYLOCAINE) 2 % external gel Apply topically as needed for moderate pain 30 mL 1      phenylephrine-shark liver oil-mineral oil-petrolatum (PREPARATION H) 0.25-3-14-71.9 % rectal ointment Place rectally 2 times daily as needed for hemorrhoids 57 g 1      polyethylene glycol (MIRALAX) 17 GM/Dose powder Take 17 g (1 capful) by mouth daily 578 g 1      Prenatal Vit-Fe Fumarate-FA (PRENATAL MULTIVITAMIN W/IRON) 27-0.8 MG tablet Take 1 tablet by mouth daily 90 tablet 3       Allergies  No Known Allergies  Immunization History   Administered Date(s) Administered     COVID-19 Vaccine 18+ (Moderna) 08/12/2021, 09/16/2021     Flu, Unspecified 01/29/2019     HepA, Unspecified 05/23/2019     HepB, Unspecified 05/14/2014, 07/17/2014, 10/21/2014, 01/29/2019, 03/12/2019     HepB-Adult 04/20/2016     Influenza Vaccine >6 months (Alfuria,Fluzone) 03/09/2020, 09/11/2020, 09/07/2021, 09/28/2022     Influenza Vaccine, 6+MO IM (QUADRIVALENT W/PRESERVATIVES) 11/26/2014, 11/05/2015, 10/30/2017, 09/24/2018     MMR 05/14/2014, 07/17/2014, 01/29/2019, 03/12/2019     Poliovirus, inactivated (IPV) 11/26/2014, 03/17/2015, 05/23/2019     Td (Adult), Adsorbed 05/14/2014     Td,adult,historic,unspecified 01/29/2019, 03/12/2019     Tdap (Adacel,Boostrix) 11/26/2014, 04/20/2016, 03/09/2020, 10/26/2022     Varicella 05/23/2019, 08/19/2020      Physical Exam  Patient Vitals for the past 24 hrs:   BP Temp Temp src Pulse Resp Height   12/31/22 1700 (!) 130/94 98.6  F (37  C) Oral -- 22 --   12/31/22 1329 -- -- -- -- -- 1.499 m (4' 11.02\")   12/31/22 1247 -- 98.2  F (36.8  C) Oral 97 18 --     Wt Readings from Last 1 " Encounters:   12/26/22 61.9 kg (136 lb 8 oz)   Prepregnancy: 59.4 kg (131 lb), BMI 26.44. Total gain: 2.495 kg (5 lb 8 oz) (expected gain: 7 kg (15 lb)-11.5 kg (25 lb)).    HEART: RRR, no murmur  LUNGS: CTA bilaterally  Fetal Heart Tones:category 1  CONTRACTIONS:  regular, every 2-4 minutes.  CERVIX: dilation on admission 4 cm , 70% effaced, medium, and -3 station.  FLUID: None noted.  Fetal Presentation: vertex.confirmed by MD tinoco in room   Labs    Group B Strep PCR   Date Value Ref Range Status   12/07/2022 Negative Negative Final     Comment:     Presumed negative for Streptococcus agalactiae (Group B Streptococcus) or the number of organisms may be below the limit of detection of the assay.      Antibody Screen   Date Value Ref Range Status   07/06/2022 Negative Negative Final     Hepatitis B Surface Antigen   Date Value Ref Range Status   07/06/2022 Nonreactive Nonreactive Final     Chlamydia trachomatis   Date Value Ref Range Status   06/23/2021 Negative NEG Final     Comment:     Negative for C. trachomatis rRNA by transcription mediated amplification.  A negative result by transcription mediated amplification does not preclude the  presence of C. trachomatis infection because results are dependent on proper  and adequate collection, absence of inhibitors, and sufficient rRNA to be  detected.    Performed and/or entered by:  [facility]  [address] [place] [address]       Neisseria gonorrhoeae   Date Value Ref Range Status   06/23/2021 Negative NEG Final     Comment:     Negative for N. gonorrhoeae rRNA by transcription mediated amplification.  A negative result by transcription mediated amplification does not preclude the  presence of N. gonorrhoeae infection because results are dependent on proper  and adequate collection, absence of inhibitors, and sufficient rRNA to be  detected.    Performed and/or entered by:  [facility]  [address]  [place] [address]       Hemoglobin   Date Value Ref Range Status   09/28/2022 11.8 11.7 - 15.7 g/dL Final      Admission on 12/31/2022   Component Date Value Ref Range Status     Hold Specimen 12/31/2022 [facility]   Final        Completed by:   Geneva Castillo MD  [facility]  12/31/2022 5:31 PM   used: Suzanne.

## 2022-12-31 NOTE — LETTER
01/02/23      To Whom it may concern:    Say Daren was in the hospital 12/31/22-01/02/23 to have a baby.  Please excuse her  from work during this time and for the next 2 weeks so he can take care of his wife and baby.      Sincerely,      Electronically signed by Marlee Pardo MD

## 2022-12-31 NOTE — PROGRESS NOTES
Say Daren presents from home with c/o contractions since 0600. She denies SROM or vaginal bleeding. EFM applied. Hx obtained with assistance of . SVE 4/50/-3. Unable to feel presenting part. Ultrasound confirms vertex per Dr. Castillo. Saline lock inserted. Will observe and re-check cervix around 1500 or sooner prn.

## 2023-01-01 PROBLEM — R30.0 DYSURIA: Status: ACTIVE | Noted: 2023-01-01

## 2023-01-01 LAB
ALBUMIN UR-MCNC: 50 MG/DL
APPEARANCE UR: ABNORMAL
BACTERIA #/AREA URNS HPF: ABNORMAL /HPF
BILIRUB UR QL STRIP: NEGATIVE
COLOR UR AUTO: ABNORMAL
GLUCOSE UR STRIP-MCNC: NEGATIVE MG/DL
HGB BLD-MCNC: 12.1 G/DL (ref 11.7–15.7)
HGB UR QL STRIP: ABNORMAL
KETONES UR STRIP-MCNC: NEGATIVE MG/DL
LEUKOCYTE ESTERASE UR QL STRIP: ABNORMAL
MUCOUS THREADS #/AREA URNS LPF: PRESENT /LPF
NITRATE UR QL: NEGATIVE
PH UR STRIP: 7.5 [PH] (ref 5–7)
RBC URINE: >182 /HPF
SP GR UR STRIP: 1.01 (ref 1–1.03)
SQUAMOUS EPITHELIAL: 2 /HPF
T PALLIDUM AB SER QL: NONREACTIVE
UROBILINOGEN UR STRIP-MCNC: <2 MG/DL
WBC CLUMPS #/AREA URNS HPF: PRESENT /HPF
WBC URINE: 97 /HPF

## 2023-01-01 PROCEDURE — 120N000001 HC R&B MED SURG/OB

## 2023-01-01 PROCEDURE — 85018 HEMOGLOBIN: CPT | Performed by: FAMILY MEDICINE

## 2023-01-01 PROCEDURE — 81001 URINALYSIS AUTO W/SCOPE: CPT | Performed by: FAMILY MEDICINE

## 2023-01-01 PROCEDURE — 250N000013 HC RX MED GY IP 250 OP 250 PS 637: Performed by: FAMILY MEDICINE

## 2023-01-01 PROCEDURE — 87086 URINE CULTURE/COLONY COUNT: CPT | Performed by: FAMILY MEDICINE

## 2023-01-01 PROCEDURE — 36415 COLL VENOUS BLD VENIPUNCTURE: CPT | Performed by: FAMILY MEDICINE

## 2023-01-01 RX ORDER — CIPROFLOXACIN 250 MG/1
250 TABLET, FILM COATED ORAL EVERY 12 HOURS SCHEDULED
Status: DISCONTINUED | OUTPATIENT
Start: 2023-01-01 | End: 2023-01-02 | Stop reason: HOSPADM

## 2023-01-01 RX ADMIN — CIPROFLOXACIN 250 MG: 250 TABLET, FILM COATED ORAL at 20:21

## 2023-01-01 RX ADMIN — IBUPROFEN 800 MG: 800 TABLET ORAL at 16:58

## 2023-01-01 RX ADMIN — DOCUSATE SODIUM 100 MG: 100 CAPSULE, LIQUID FILLED ORAL at 09:08

## 2023-01-01 RX ADMIN — IBUPROFEN 800 MG: 800 TABLET ORAL at 00:03

## 2023-01-01 RX ADMIN — ACETAMINOPHEN 650 MG: 325 TABLET ORAL at 09:08

## 2023-01-01 RX ADMIN — IBUPROFEN 800 MG: 800 TABLET ORAL at 09:06

## 2023-01-01 ASSESSMENT — ACTIVITIES OF DAILY LIVING (ADL)
ADLS_ACUITY_SCORE: 18

## 2023-01-01 NOTE — PLAN OF CARE
Problem: Postpartum (Vaginal Delivery)  Goal: Hemostasis  Outcome: Adequate for Care Transition  Goal: Absence of Infection Signs and Symptoms  Outcome: Adequate for Care Transition     Problem: Plan of Care - These are the overarching goals to be used throughout the patient stay.    Goal: Optimal Comfort and Wellbeing  Intervention: Monitor Pain and Promote Comfort  Recent Flowsheet Documentation  Taken 1/1/2023 1032 by Fela Eagle, RN  Pain Management Interventions: medication (see MAR)  Taken 1/1/2023 1016 by Fela Eagle, RN  Pain Management Interventions: medication (see MAR)  Intervention: Provide Person-Centered Care  Recent Flowsheet Documentation  Taken 1/1/2023 0800 by Fela Eagle, RN  Trust Relationship/Rapport:    care explained    choices provided    questions encouraged     Problem: Postpartum (Vaginal Delivery)  Goal: Optimal Pain Control and Function  Intervention: Prevent or Manage Pain  Recent Flowsheet Documentation  Taken 1/1/2023 1032 by Fela Eagle, RN  Pain Management Interventions: medication (see MAR)  Taken 1/1/2023 1016 by Fela Eagle, RN  Pain Management Interventions: medication (see MAR)   VSS, progressing WDL, pain is controlled with PRN medications which is effective, took a shower, FOB,  independent with infant cares and bondingwell. continue to monitor per unit routine, concerned not having breast milk for baby, educated about the amount baby should get.

## 2023-01-01 NOTE — PROVIDER NOTIFICATION
"Notified Dr. Geneva Castillo at 1530 that the pt's urine results came back, culture is still pending.     Response: \"I will look at the results and place orders for antibiotic if necessary\"  "

## 2023-01-01 NOTE — PLAN OF CARE
"  Problem: Plan of Care - These are the overarching goals to be used throughout the patient stay.    Goal: Plan of Care Review  Description: The Plan of Care Review/Shift note should be completed every shift.  The Outcome Evaluation is a brief statement about your assessment that the patient is improving, declining, or no change.  This information will be displayed automatically on your shift note.  Outcome: Adequate for Care Transition  Goal: Patient-Specific Goal (Individualized)  Description: You can add care plan individualizations to a care plan. Examples of Individualization might be:  \"Parent requests to be called daily at 9am for status\", \"I have a hard time hearing out of my right ear\", or \"Do not touch me to wake me up as it startles me\".  Outcome: Adequate for Care Transition  Goal: Absence of Hospital-Acquired Illness or Injury  Outcome: Adequate for Care Transition  Goal: Optimal Comfort and Wellbeing  Outcome: Adequate for Care Transition  Goal: Readiness for Transition of Care  Outcome: Adequate for Care Transition  Flowsheets (Taken 12/31/2022 1336)  Anticipated Changes Related to Illness: none  Intervention: Mutually Develop Transition Plan  Recent Flowsheet Documentation  Taken 12/31/2022 1336 by Patti Garcia, RN  Anticipated Changes Related to Illness: none  Taken 12/31/2022 1334 by Patti Garcia, RN  Equipment Currently Used at Home: none  Taken 12/31/2022 1312 by Patti Garcia, RN  Equipment Currently Used at Home: none   Say Daren was safely delivered of her baby boy  "

## 2023-01-01 NOTE — L&D DELIVERY NOTE
Delivery Summary  [facility]  Date of Service: 2022    Name      Oli Mercedes         1985  MRN       [id number]  PCP        Prashanth Blanco at [facility], [phone].  Dr Rodriguez for prenatal care     GA: 39w1d  GP:   Labor Complications: None   Additional Complications:    QBL: 100 mL  Delivery Type: Vaginal, Spontaneous   Duration of Ruptured Membranes: 0h 57m  Continental Weight:    Apgar scores: 8 ,       _________________    Prenatal Course:  Oli Mercedes is a 37 year old  at 39w1d based on 14 wk us.  She presented to [facility] at 14 weeks gestation for regular prenatal care.  She had approximately 2.495 kg (5 lb 8 oz) weight gain.  Fundal height was congruent with dates.  U/a and BP were normal throughout.      Prenatal complications included marginal cord insertion       Vaccinations in pregnancy included flu and adacel and covid booster postpartum.      DELIVERY NARRATIVE  Stage 1:   Patient presented to Maternity Care on 2022 with contractions .  Onset of labor at 1500  Patient progressed to complete without intervention.  Stage 1 was uncomplicated.    Induction no .  Augmentation no  Monitors: external showed category 1.    Labor Analgesia/Anesthesia: none  ROM: SROM - clear fluid at 5-6 cm  Her group B Strep (GBS) carrier status was negative..    Stage 2:  Delivery was via vaginal, spontaneous  to a sterile field. Infant delivered in vertex  left  occiput  anterior  position. Shoulders delivered without difficulty. The baby was placed on the patient's abdomen.  Cord complications: none . Delayed cord clamping was performed.  The cord was doubly clamped and cut   were noted.     Apgars of 8 and  via Vaginal, Spontaneous Delivery.    Delivery was complicated by nothing    Stage 3:   Placenta delivered at 2022  5:55 PM . Placental disposition was Hospital disposal . Fundal massage performed  and fundus found to be  firm. The following uterotonics were given: Pitocin (IV). Perineum, vagina, cervix were inspected by provider and the following lacerations were noted: None.    QBL: 100 mL.      Firm fundus noted without active bleeding at the end of her evaluation with excellent hemostasis noted.    Cord pH obtained: no    Stage 4:  Mom and baby are both stable in delivery room.  Mom plans to breast feed baby.  Vaccines covid booster postpartum.   Plans for birth control depo or pills.  Anticipate discharge for mom and baby within 48 hours.     Dai Mercedes-Say [[id number]]    Labor Event Times    Labor onset date: 22 Onset time:  3:00 PM   Dilation complete date: 22 Complete time:  5:47 PM   Start pushing date/time: 2022 1741      Labor Events     labor?: No   steroids: None  Labor Type: Spontaneous     Antibiotics received during labor?: No     Rupture identifier: Sac 1  Rupture date/time: 22 1655   Rupture type: Spontaneous rupture of membranes occuring during spontaneous labor or augmentation  Fluid color: Clear     Augmentation: None     Delivery/Placenta Date and Time    Delivery Date: 22 Delivery Time:  5:52 PM   Placenta Date/Time: 2022  5:55 PM  Oxytocin given at the time of delivery: after delivery of placenta          Vaginal Counts     Initial count performed by 2 team members:  Two Team Members   jones   hammes       Needles Suture Needles Sponges (RETIRED) Instruments   Initial counts 0 0 0    Added to count       Relief counts       Final counts             Placed during labor Accounted for at the end of labor   FSE No NA   IUPC No NA   Cervidil No NA                     Apgars    Living status: Living   1 Minute 5 Minute 10 Minute 15 Minute 20 Minute   Skin color: 0        Heart rate: 2        Reflex irritability: 2        Muscle tone: 2        Respiratory effort: 2        Total: 8           Cord    Cord Complications: None               Stem  cell collection?: No       Labor Events and Shoulder Dystocia    Fetal Tracing Prior to Delivery: Category 2  Fetal Tracing Comments: decels with pushing  Shoulder dystocia present?: Neg     Delivery (Maternal) (Provider to Complete) (698120)    Episiotomy: None  Perineal lacerations: None    Repair suture: None  Genital tract inspection done: Pos     Blood Loss  Mother: Daren, Say #[id number]   Start of Mother's Information    Delivery Blood Loss  12/31/22 1500 - 12/31/22 1807    Delivery QBL (mL) Hospital Encounter 100 mL    Total  100 mL         End of Mother's Information  Mother: Daren, Say #[id number]          Delivery - Provider to Complete (447110)    Attempted Delivery Types (Choose all that apply): Spontaneous Vaginal Delivery  Delivery Type (Choose the 1 that will go to the Birth History): Vaginal, Spontaneous                                 Placenta    Date/Time: 12/31/2022  5:55 PM  Removal: Spontaneous  Disposition: Hospital disposal           Presentation and Position    Presentation: Vertex    Position: Left Occiput Anterior                 Completed by:   Geneva Castillo MD  [facility]  12/31/2022 6:07 PM   used: Ramsey

## 2023-01-01 NOTE — PROVIDER NOTIFICATION
01/01/23 1300   Breast Pumping   Breast Care double electric breast pump utilized   Breast Pumping Interventions post-feed pumping encouraged     Pt is worried about not having enough milk for baby, infant latches and have been voiding,

## 2023-01-01 NOTE — PROGRESS NOTES
Maternal Postpartum Progress Note  [facility] Maternity Care  Date of Service: 2023    Name      Oli MARINO       1985  MRN       [id number]  PCP        Prashanth Johnson    ________________________________________________________________________  Patient is now38 year old  s/p  at 39w1d, delivered yesterday by Dr. Castillo.  no lacerations and no complications.     Assessment:   Postpartum Day #1 s/p vaginal delivery  doing well without concerns.    Check urine for issues based on symptoms - cx pending.  Will start empirically on cipro 250mg bid x 6 doses.      Patient Active Problem List    Diagnosis Date Noted     Normal delivery 2022     Priority: Medium     External hemorrhoids 10/26/2022     Priority: Medium     Slow transit constipation 10/26/2022     Priority: Medium     Marginal insertion of umbilical cord affecting management of mother 2022     Priority: Medium     Antepartum multigravida of advanced maternal age 2022     Priority: Medium     Language barrier affecting health care 2021     Priority: Medium     Microtia of right ear 2021     Priority: Medium     Congenital atresia of right external ear 2021     Priority: Medium     Mixed hearing loss, bilateral 2021     Priority: Medium     Urticaria 2021     Priority: Medium        Plan:   1) Continue routine postpartum cares.  2) Lactation: as needed  3) VACCINATIONS: covid booster postpartum  4) DISPO: anticipate discharge home tomorrow.  Patient agreeable to home nurse visit for herself and  after discharge.    Subjective:  Patient doing well with no concerns. Lochia is mild without clots.  Pain is well controlled with Ibuprofen.  Eating and ambulating well. Normal urine output. No constipation.  Patient denies headache, dizziness, dyspnea, and leg pain.  The baby is well and being fed by breast.  Lactation as needed- will get mom pump     Pt notes  "some pain with urination for many days now- even before labor and delivery.  No catheter used.  Able to urinate and fully empty bladder- just painful and irritated.  Will check urine today     Objective:  Patient Vitals for the past 24 hrs:   BP Temp Temp src Pulse Resp Height   01/01/23 0753 108/79 98.7  F (37.1  C) Oral 91 16 --   01/01/23 0430 110/68 -- -- 72 16 --   12/31/22 2359 104/70 98.2  F (36.8  C) Oral 88 18 --   12/31/22 1947 128/68 -- -- -- -- --   12/31/22 1916 110/76 -- -- -- -- --   12/31/22 1900 119/78 -- -- -- -- --   12/31/22 1847 (!) 132/97 -- -- -- 16 --   12/31/22 1832 128/89 -- -- -- 18 --   12/31/22 1700 (!) 130/94 98.6  F (37  C) Oral -- 22 --   12/31/22 1329 -- -- -- -- -- 1.499 m (4' 11.02\")   12/31/22 1247 -- 98.2  F (36.8  C) Oral 97 18 --     General: Alert, comfortable.  Heart: RRR, no murmur.  Lungs: CTA bilaterally.  Abdomen: non-distended. Uterine fundus firm, below umbilicus.  Ext: trace edema, no calf tenderness.    Labs:  Recent Results (from the past 24 hour(s))   Adult Type and Screen    Collection Time: 12/31/22  3:50 PM   Result Value Ref Range    ABO/RH(D) O POS     Antibody Screen Negative Negative    SPECIMEN EXPIRATION DATE [id number]    Extra Purple Top Tube    Collection Time: 12/31/22  3:50 PM   Result Value Ref Range    Hold Specimen [facility]    Hemoglobin    Collection Time: 01/01/23  7:43 AM   Result Value Ref Range    Hemoglobin 12.1 11.7 - 15.7 g/dL            Completed by:   Geneva Castillo MD, M.D.  [facility]  1/1/2023 9:37 AM   used: Suzanne.    "

## 2023-01-02 VITALS
HEART RATE: 105 BPM | SYSTOLIC BLOOD PRESSURE: 111 MMHG | HEIGHT: 59 IN | RESPIRATION RATE: 18 BRPM | BODY MASS INDEX: 27.55 KG/M2 | OXYGEN SATURATION: 98 % | TEMPERATURE: 98.2 F | DIASTOLIC BLOOD PRESSURE: 84 MMHG

## 2023-01-02 PROCEDURE — 250N000013 HC RX MED GY IP 250 OP 250 PS 637: Performed by: FAMILY MEDICINE

## 2023-01-02 PROCEDURE — 99207 PR SUBSEQUENT HOSPITAL CARE FOR MOTHER, 15 MINUTES: CPT | Performed by: FAMILY MEDICINE

## 2023-01-02 RX ORDER — CIPROFLOXACIN 250 MG/1
250 TABLET, FILM COATED ORAL EVERY 12 HOURS
Qty: 4 TABLET | Refills: 0 | Status: SHIPPED | OUTPATIENT
Start: 2023-01-02 | End: 2023-02-06

## 2023-01-02 RX ORDER — DOCUSATE SODIUM 100 MG/1
100 CAPSULE, LIQUID FILLED ORAL 2 TIMES DAILY PRN
Qty: 28 CAPSULE | Refills: 0 | Status: SHIPPED | OUTPATIENT
Start: 2023-01-02 | End: 2023-02-01

## 2023-01-02 RX ORDER — ACETAMINOPHEN 325 MG/1
325-650 TABLET ORAL EVERY 6 HOURS PRN
Qty: 30 TABLET | Refills: 0 | Status: SHIPPED | OUTPATIENT
Start: 2023-01-02 | End: 2023-02-01

## 2023-01-02 RX ORDER — ACETAMINOPHEN AND CODEINE PHOSPHATE 120; 12 MG/5ML; MG/5ML
0.35 SOLUTION ORAL DAILY
Qty: 84 TABLET | Refills: 3 | Status: SHIPPED | OUTPATIENT
Start: 2023-01-02 | End: 2023-03-01

## 2023-01-02 RX ORDER — IBUPROFEN 800 MG/1
800 TABLET, FILM COATED ORAL EVERY 8 HOURS PRN
Qty: 30 TABLET | Refills: 0 | Status: SHIPPED | OUTPATIENT
Start: 2023-01-02 | End: 2023-02-01

## 2023-01-02 RX ADMIN — IBUPROFEN 800 MG: 800 TABLET ORAL at 08:01

## 2023-01-02 RX ADMIN — DOCUSATE SODIUM 100 MG: 100 CAPSULE, LIQUID FILLED ORAL at 08:01

## 2023-01-02 RX ADMIN — CIPROFLOXACIN 250 MG: 250 TABLET, FILM COATED ORAL at 08:02

## 2023-01-02 ASSESSMENT — ACTIVITIES OF DAILY LIVING (ADL)
ADLS_ACUITY_SCORE: 18

## 2023-01-02 NOTE — PLAN OF CARE
Problem: Postpartum (Vaginal Delivery)  Goal: Successful Maternal Role Transition  Outcome: Met     Patient assessments and vitals are WDL. Sold breats pump. Spouse is supportive. Educated patient on medications, worsening symptoms, and follow-up care. Used Zainab . Pt verbalized understanding with no further questions and signed AVS and for medications.

## 2023-01-02 NOTE — PLAN OF CARE
Problem: Breastfeeding  Goal: Effective Breastfeeding  Intervention: Promote Breast Care and Comfort    VSS. Fundus firm. Pt reports tolerable pain relief with prn ibuprofen use, denied additional pain medications when offered. Ambulating independently in the room. Breastfeeding Q 2-3 hours. Educated pt on pump use and using pump to increase/ initiate milk supply and patient verbalized understanding. Pt is pumping after feeds. Bonding well with baby.  used for all encounters.

## 2023-01-02 NOTE — PLAN OF CARE
Problem: Breastfeeding  Goal: Effective Breastfeeding  Intervention: Promote Breast Care and Comfort  Recent Flowsheet Documentation  Taken 1/2/2023 0000 by Kat Brady RN  Breast Care: double electric breast pump utilized     Problem: Postpartum (Vaginal Delivery)  Goal: Successful Maternal Role Transition  Outcome: Progressing   Daren Say Vitals are stable  afebrile, Post-partum assessment within defined limit. She is up independently voiding adequately. She is breastfeeding and pumping. Requiring Zainab . Significant other at bedside assisting with cares. Parents bonding well with baby Kat Brady, RN

## 2023-01-02 NOTE — PROGRESS NOTES
"Outreach Nurse Note    Say Daren  [id number]  1985    Chart reviewed, discharge plan discussed with attending physician, and bedside RN, needs assessed. Patient, Say Daren, requests home care visit as ordered, nurse visit planned for Wednesday, 2023, Suzanne  needed; Home Care Intake updated by this writer. Follow-up post-delivery appointment with  planned in 6 weeks at [facility] (postpartum appt currently scheduled 23).    Say Daren is reported to have support at home and is ready to discharge today with , \"Eh Theyu Heraclio\". Outreach RN will continue to follow and assist if needed with discharge plan. No additional needs identified at this time.    "

## 2023-01-02 NOTE — DISCHARGE SUMMARY
Maternal Discharge Summary  [facility] Maternity Care  Date of Service: 2023    Name      Oli Mercedes         1985  MRN       [id number]  PCP        Prashanth Blanco at [facility], [phone].    ________________________________________________________________________    Assessment:  Oli Mercedes is a 38 year old now  s/p vaginal, spontaneous  at 39w1d on 22.    Dysuria/presumed UTI.  Started on 3 day course of ciprofloxacin due to dysuria and positive UA; urine culture pending.    Discharge Plan:   1. Discharge to Home. Condition at Discharge:  stable  2. Physical activity: Regular.  3. Diet:  Regular.  4. Home care nurse: ordered for 1-2 days from now.  5. Breastfeeding; breastpump given in hospital.  6. Contraception plan: mini-pill. Advised need to be taken same time daily, starting 2 weeks from now..  7. Follow up with Prashanth Turner in 6 weeks.  Future Appointments   Date Time Provider Department Center   2023  1:00 PM Marianne Rodriguez MD [facility]           Medication List      Started    acetaminophen 325 MG tablet  Commonly known as: TYLENOL  325-650 mg, Oral, EVERY 6 HOURS PRN     ciprofloxacin 250 MG tablet  Commonly known as: CIPRO  250 mg, Oral, EVERY 12 HOURS     docusate sodium 100 MG capsule  Commonly known as: COLACE  100 mg, Oral, 2 TIMES DAILY PRN     ibuprofen 800 MG tablet  Commonly known as: ADVIL/MOTRIN  800 mg, Oral, EVERY 8 HOURS PRN     norethindrone 0.35 MG tablet  Commonly known as: MICRONOR  0.35 mg, Oral, DAILY           ________________________________________________________________________    Admission Date:  2022  Discharge Date:  2023  Delivery Date:  @BABYSUPPRESS(DBLINK,ept,110,,1,,)@   Gestational Age at Delivery: 39w1d    Principal Diagnosis: Labor and delivery  Delivery type: Vaginal, Spontaneous     Principal Problem:    Normal delivery  Active Problems:     Dysuria      Subjective:  Patient denies headache, dizziness, dyspnea, and leg pain. Ambulating and eating.    Discharge Exam:  Patient Vitals for the past 24 hrs:   BP Temp Temp src Pulse Resp SpO2   01/02/23 0755 111/84 98.2  F (36.8  C) Oral 105 18 98 %   01/02/23 0008 113/80 97.9  F (36.6  C) Oral 86 18 96 %   01/01/23 1956 123/88 98  F (36.7  C) Oral -- 18 99 %   01/01/23 1558 116/76 98.1  F (36.7  C) Oral -- 18 97 %     General - alert, comfortable  Heart - RRR, no murmurs  Lungs - CTA bilaterally  Abdomen - fundus firm, nontender, below umbilicus  Extremities - trace edema  Admission on 12/31/2022   Component Date Value Ref Range Status     Treponema Antibody Total 12/31/2022 Nonreactive  Nonreactive Final     ABO/RH(D) 12/31/2022 O POS   Final     Antibody Screen 12/31/2022 Negative  Negative Final     SPECIMEN EXPIRATION DATE 12/31/2022 [id number]   Final     Hold Specimen 12/31/2022 [facility]   Final     Hemoglobin 01/01/2023 12.1  11.7 - 15.7 g/dL Final     Color Urine 01/01/2023 Orange (A)  Colorless, Straw, Light Yellow, Yellow Final     Appearance Urine 01/01/2023 Turbid (A)  Clear Final     Glucose Urine 01/01/2023 Negative  Negative mg/dL Final     Bilirubin Urine 01/01/2023 Negative  Negative Final     Ketones Urine 01/01/2023 Negative  Negative mg/dL Final     Specific Gravity Urine 01/01/2023 1.013  1.001 - 1.030 Final     Blood Urine 01/01/2023 >1.0 mg/dL (A)  Negative Final     pH Urine 01/01/2023 7.5 (H)  5.0 - 7.0 Final     Protein Albumin Urine 01/01/2023 50 (A)  Negative mg/dL Final     Urobilinogen Urine 01/01/2023 <2.0  <2.0 mg/dL Final     Nitrite Urine 01/01/2023 Negative  Negative Final     Leukocyte Esterase Urine 01/01/2023 500 Choco/uL (A)  Negative Final     Bacteria Urine 01/01/2023 Many (A)  None Seen /HPF Final     WBC Clumps Urine 01/01/2023 Present (A)  None Seen /HPF Final     Mucus Urine 01/01/2023 Present (A)  None Seen /LPF Final     RBC Urine 01/01/2023 >182 (H)  <=2 /HPF  Final     WBC Urine 01/01/2023 97 (H)  <=5 /HPF Final     Squamous Epithelials Urine 01/01/2023 2 (H)  <=1 /HPF Final      Discharge Medications:   See medication reconciliation.     Completed by:   Marlee Pardo MD  [facility]  1/2/2023 10:17 AM   used: [place]

## 2023-01-02 NOTE — DISCHARGE INSTRUCTIONS
You have a Home Care nurse visit planned for Wednesday, 01/04/2023. The nurse will contact you after discharge to confirm the appointment time. If you do not hear from the nurse by Wednesday morning, please call [phone]. Do not schedule a clinic appointment on the same day as home nurse visit.       Postpartum Vaginal Delivery Instructions    Activity     Ask family and friends for help when you need it.  Do not place anything in your vagina for 6 weeks.  You are not restricted on other activities, but take it easy for a few weeks to allow your body to recover from delivery.  You are able to do any activities you feel up to that point.  No driving until you have stopped taking your pain medications (usually two weeks after delivery).     Call your health care provider if you have any of these symptoms:     Increased pain, swelling, redness, or fluid around your stiches from an episiotomy or perineal tear.  A fever above 100.4 F (38 C) with or without chills when placing a thermometer under your tongue.  You soak a sanitary pad with blood within 1 hour, or you see blood clots larger than a golf ball.  Bleeding that lasts more than 6 weeks.  Vaginal discharge that smells bad.  Severe pain, cramping or tenderness in your lower belly area.  A need to urinate more frequently (use the toilet more often), more urgently (use the toilet very quickly), or it burns when you urinate.  Nausea and vomiting.  Redness, swelling or pain around a vein in your leg.  Problems breastfeeding or a red or painful area on your breast.  Chest pain and cough or are gasping for air.  Problems coping with sadness, anxiety, or depression.  If you have any concerns about hurting yourself or the baby, call your provider immediately.   You have questions or concerns after you return home.     Keep your hands clean:  Always wash your hands before touching your perineal area and stitches.  This helps reduce your risk of infection.  If your hands  aren't dirty, you may use an alcohol hand-rub to clean your hands. Keep your nails clean and short.

## 2023-01-03 LAB — BACTERIA UR CULT: NORMAL

## 2023-01-09 ENCOUNTER — MEDICAL CORRESPONDENCE (OUTPATIENT)
Dept: HEALTH INFORMATION MANAGEMENT | Facility: CLINIC | Age: 38
End: 2023-01-09

## 2023-01-09 DIAGNOSIS — Z59.71 INSURANCE COVERAGE PROBLEMS: Primary | ICD-10-CM

## 2023-01-09 NOTE — PROGRESS NOTES
Discussed with CCC RN.   Will place referral under mom's name.   Needs assistance with wic and  insurance      Prashanth Johnson MD

## 2023-01-10 ENCOUNTER — PATIENT OUTREACH (OUTPATIENT)
Dept: CARE COORDINATION | Facility: CLINIC | Age: 38
End: 2023-01-10

## 2023-01-10 NOTE — PROGRESS NOTES
Clinic Care Coordination Contact  Program: Adding   County:  [facility] Case #:  [facility] Worker:   Cedrick #:   Subscriber #:   Renewal:  Date Applied:     FRW Outreach:   1/10/23: FRW called pt on referral. Pt and FRW will add  on .     Health Insurance:      Referral/Screening:

## 2023-01-10 NOTE — PROGRESS NOTES
Clinic Care Coordination Contact  Community Health Worker Initial Outreach    CHW Initial Information Gathering:  Referral Source: PCP  Preferred Hospital: [facility]  [phone]  Preferred Urgent Care: [facility] [place], [phone]  Current living arrangement:: I live in a private home with family  Type of residence:: Private home - stairs  Community Resources: WIC  Supplies Currently Used at Home: None  Equipment Currently Used at Home: none  Informal Support system:: Family  No PCP office visit in Past Year: No  Transportation means:: Family, Medical transport  CHW Additional Questions  If ED/Hospital discharge, follow-up appointment scheduled as recommended?: N/A  Medication changes made following ED/Hospital discharge?: N/A  MyChart active?: No  Patient agreeable to assistance with activating MyChart?: No    Patient accepts CC: Yes. Patient scheduled for assessment with CCC SW on 2023 at 10:00 AM. Patient noted desire to discuss Patient needs assistance with  insurance. .     Financial Resource Worker Screening    [facility] Benefits  Is patient requesting help applying for [facility] benefits?: No    Insurance:  [place] insurance?: No  Is this an insurance renewal?: No  Is this a new insurance application request?: Yes  Have you recently applied for insurance?: No  How many people in your household? : 6  Do you file taxes?: Yes  How many dependents do you claim?: 3  What is the monthly gross income for the household (wages, social security, workers comp, and pension)? : 3000    Any other information for the FRW?: Patient needs help adding new born for health insurance.    Care Coordination team will tell patient:   Thank you for answering all the questions, based on screening questions, our Financial Resource Worker will reach out to you with additional questions and next steps.    CHW assisted patient with WIC appointment to add new born  and scheduled on 1/11/2023 at 12:00 PM.     [facility] clinic.  [facility] [address]  [phone]

## 2023-01-18 ENCOUNTER — PATIENT OUTREACH (OUTPATIENT)
Dept: NURSING | Facility: CLINIC | Age: 38
End: 2023-01-18
Payer: COMMERCIAL

## 2023-01-18 ASSESSMENT — SOCIAL DETERMINANTS OF HEALTH (SDOH)
HOW OFTEN DO YOU GET TOGETHER WITH FRIENDS OR RELATIVES?: TWICE A WEEK
DO YOU BELONG TO ANY CLUBS OR ORGANIZATIONS SUCH AS CHURCH GROUPS UNIONS, FRATERNAL OR ATHLETIC GROUPS, OR SCHOOL GROUPS?: NO
IN A TYPICAL WEEK, HOW MANY TIMES DO YOU TALK ON THE PHONE WITH FAMILY, FRIENDS, OR NEIGHBORS?: TWICE A WEEK
HOW OFTEN DO YOU ATTENT MEETINGS OF THE CLUB OR ORGANIZATION YOU BELONG TO?: 1 TO 4 TIMES PER YEAR
HOW OFTEN DO YOU ATTEND CHURCH OR RELIGIOUS SERVICES?: 1 TO 4 TIMES PER YEAR

## 2023-01-18 ASSESSMENT — ACTIVITIES OF DAILY LIVING (ADL): DEPENDENT_IADLS:: INDEPENDENT

## 2023-01-18 NOTE — LETTER
[facility]  Patient Centered Plan of Care  About Me:        Patient Name:  Oli Mercedes    YOB: 1985  Age:         38 year old   Kieran MRN:    [id number] Telephone Information:  Home Phone [phone]   Mobile [phone]       Address:  [address] [place] [address] Email address:  No e-mail address on record      Emergency Contact(s)    Name Relationship Lgl Grd Work Phone Home Phone Mobile Phone   1. BRANDON LAGOS KNAE Uncle    [phone]   2. ROSEY,DAWNA SOFÍA Spouse    [phone]           Primary language:  Zainab     needed? Yes   [facility]:  [phone] op. 1  Other communication barriers:Language barrier    Preferred Method of Communication:     Current living arrangement: I live in a private home with family    Mobility Status/ Medical Equipment: Independent        Health Maintenance  Health Maintenance Reviewed: Due/Overdue   Health Maintenance Due   Topic Date Due     ADVANCE CARE PLANNING  Never done     COVID-19 Vaccine (3 - Booster for Moderna series) 11/11/2021     YEARLY PREVENTIVE VISIT  06/23/2022     PHQ-2 (once per calendar year)  01/01/2023           My Access Plan  Medical Emergency 911   Primary Clinic Line [facility] [phone]   24 Hour Appointment Line [phone] or  [phone]) (toll-free)   24 Hour Nurse Line [phone] (toll-free)   Preferred Urgent Care [facility] [phone]     Preferred Hospital [facility]  [phone]     Preferred Pharmacy [id number] - [place], [place] - [address]     Behavioral Health Crisis Line The National Suicide Prevention Lifeline at 1-337.485.5291 or Text/Call 648             My Care Team Members  Patient Care Team       Relationship Specialty Notifications Start End    Prashanth Johnson MD PCP - General Family Practice  4/28/21     Phone: [phone] Fax:  [phone]         [address] [place] [address] [address] [place] [address]    Prashanth Johnson MD Assigned PCP   6/16/21     Phone: [phone] Fax: [phone]         [address] [place] [address] [address] [place] [address]    Madeleine PEREIRA worker   12/6/21     [facility]; [email or web address]    Phone: [phone]         Karla Light MD MD Otolaryngology  4/14/22     Phone: [phone] Fax: [phone]         [address] [facility] [address] [facility] [address]    Karla Light MD MD Otolaryngology  4/14/22     Phone: [phone] Fax: [phone]         [address] [facility] [address] [facility] [address]    Karla Light MD Assigned Surgical Provider   7/16/22     Phone: [phone] Fax: [phone]         [address] [facility] [address] [facility] [address]    Terrence Hawk Community Health Worker Primary Care - CC Admissions 1/9/23     Phone: [phone] Fax: [phone]         [address] [place] [address] [place] [address] [facility] [address]    Deysi Cordon LGSW Lead Care Coordinator  Admissions 1/10/23     Rosa Villatoro Financial Resource Worker   1/10/23             My Care Plans  Self Management and Treatment Plan  Care Plan  Care Plan: Health insurance for new born     Problem: HP GENERAL PROBLEM     Goal: General Goal - please update text     Start Date: 1/18/2023 Expected End Date: 2/20/2023    This Visit's Progress: 20%    Note:     Barriers: language  Strengths: ability to seek support  Patient expressed understanding of goal: yes  Action steps to achieve this goal:  1. I will answer the phone when FRW contacts me  2. I will provide all information and necessary verifications  3. I will follow up with CCC in 30 days regarding my progress with my goals                           Action Plans on File:                       Advance Care Plans/Directives Type:   No data recorded    My Medical and Care Information  Problem List   Patient Active Problem List   Diagnosis     Urticaria     Congenital atresia of right external ear      Mixed hearing loss, bilateral     Microtia of right ear     Language barrier affecting health care     Antepartum multigravida of advanced maternal age     Marginal insertion of umbilical cord affecting management of mother     External hemorrhoids     Slow transit constipation     Normal delivery     Dysuria      Current Medications and Allergies:  See printed Medication Report.    Care Coordination Start Date: 1/9/2023   Frequency of Care Coordination: monthly     Form Last Updated: 01/18/2023

## 2023-01-18 NOTE — PROGRESS NOTES
Clinic Care Coordination Contact    Clinic Care Coordination Contact  OUTREACH    Referral Information:  Referral Source: PCP    Primary Diagnosis: Psychosocial    Chief Complaint   Patient presents with     Clinic Care Coordination - Initial        Universal Utilization: appropriate  Clinic Utilization  Difficulty keeping appointments:: No  Compliance Concerns: No  No-Show Concerns: No  No PCP office visit in Past Year: No  Utilization    Hospital Admissions  1             ED Visits  0             No Show Count (past year)  21                Current as of: 1/12/2023  9:43 PM              Clinical Concerns:  Current Medical Concerns:    Patient Active Problem List   Diagnosis     Urticaria     Congenital atresia of right external ear     Mixed hearing loss, bilateral     Microtia of right ear     Language barrier affecting health care     Antepartum multigravida of advanced maternal age     Marginal insertion of umbilical cord affecting management of mother     External hemorrhoids     Slow transit constipation     Normal delivery     Dysuria         Current Behavioral Concerns: none    Education Provided to patient: CCC education, resources and supports   Pain  Pain (GOAL):: No  Health Maintenance Reviewed: Due/Overdue   Health Maintenance Due   Topic Date Due     ADVANCE CARE PLANNING  Never done     COVID-19 Vaccine (3 - Booster for Moderna series) 11/11/2021     YEARLY PREVENTIVE VISIT  06/23/2022     PHQ-2 (once per calendar year)  01/01/2023       Medication Management:  Medication review status: Medications reviewed and no changes reported per patient.             Functional Status:  Dependent ADLs:: Independent  Dependent IADLs:: Independent  Bed or wheelchair confined:: No  Mobility Status: Independent  Any fall with injury in the past year?: No    Living Situation:  Current living arrangement:: I live in a private home with family  Type of residence:: Private home - stairs    Lifestyle & Psychosocial  Needs:    Social Determinants of Health     Tobacco Use: Medium Risk     Smoking Tobacco Use: Never     Smokeless Tobacco Use: Former     Passive Exposure: Not on file   Alcohol Use: Not At Risk     Frequency of Alcohol Consumption: Never     Average Number of Drinks: Not on file     Frequency of Binge Drinking: Never   Financial Resource Strain: Low Risk      Difficulty of Paying Living Expenses: Not very hard   Food Insecurity: No Food Insecurity     Worried About Running Out of Food in the Last Year: Never true     Ran Out of Food in the Last Year: Never true   Transportation Needs: Unmet Transportation Needs     Lack of Transportation (Medical): Yes     Lack of Transportation (Non-Medical): Yes   Physical Activity: Not on file   Stress: Stress Concern Present     Feeling of Stress : To some extent   Social Connections: Socially Integrated     Frequency of Communication with Friends and Family: Twice a week     Frequency of Social Gatherings with Friends and Family: Twice a week     Attends Sabianism Services: 1 to 4 times per year     Active Member of Clubs or Organizations: No     Attends Club or Organization Meetings: 1 to 4 times per year     Marital Status:    Intimate Partner Violence: Not At Risk     Fear of Current or Ex-Partner: No     Emotionally Abused: No     Physically Abused: No     Sexually Abused: No   Depression: Not at risk     PHQ-2 Score: 0   Housing Stability: Not on file   Postpartum Depression: Not on file     Diet:: Regular  Inadequate nutrition (GOAL):: No  Tube Feeding: No  Inadequate activity/exercise (GOAL):: No  Significant changes in sleep pattern (GOAL): Yes  Transportation means:: Family, Medical transport     Sabianism or spiritual beliefs that impact treatment:: No  Mental health DX:: No  Mental health management concern (GOAL):: No  Chemical Dependency Status: No Current Concerns  Informal Support system:: Family             Resources and Interventions:  Current  "Resources:      Community Resources: [place], [facility] Programs  Supplies Currently Used at Home: None  Equipment Currently Used at Home: none  Employment Status: unemployed         Advance Care Plan/Directive  Advanced Care Plans/Directives on file:: No  Advanced Care Plan/Directive Status: Declined Further Information    Referrals Placed: None      Care Plan:  Care Plan: Health insurance for new born     Problem: HP GENERAL PROBLEM     Goal: General Goal - please update text     Start Date: 1/18/2023 Expected End Date: 2/20/2023    This Visit's Progress: 20%    Note:     Barriers: language  Strengths: ability to seek support  Patient expressed understanding of goal: yes  Action steps to achieve this goal:  1. I will answer the phone when FRW contacts me  2. I will provide all information and necessary verifications  3. I will follow up with CCC in 30 days regarding my progress with my goals                          Patient/Caregiver understanding: yes    Outreach Frequency: monthly  Future Appointments              Tomorrow Rosa Villatoro; PHONE,  [facility] Care Coordination, [facility]    In 1 month Marianne Rodriguez MD [facility] ZONIA Olvera [facility]          Plan: CCSW, along with Zainab beyer completed CCSW assessment. Pt stated she is doing well, she is unable to fall asleep since she has had her baby. CCSW asked if pt would like to see PCP regardign this issue. CCSW sent inbasket message as pt has an appointment in March, but perhaps she should be seen earlier. Pt stated she has all the baby supplies she needs. Pt stated she has WIC for her baby and food support for the rest of the family. Stated they have enough food. Pt stated her mental health is \"fine.\" this writer is concern that if pt continues to be unable to sleep, her mental health could become significantly worse. Pt denied any further needs a this time. Enrolled in [facility] for assist with health care. CHW to review at next " outreach if pt has further needs.         JENNIFER Dawson, [facility]  Social Work Care Coordinator

## 2023-01-18 NOTE — LETTER
[facility] CARE COORDINATION  [facility]  January 18, 2023    Say Daren  [address] [place] [address]      Dear Say,        I am a clinic care coordinator who works with Prashanth Johnson MD with the [facility]. I wanted to thank you for spending the time to talk with me.  Below is a description of clinic care coordination and how I can further assist you.       The clinic care coordination team is made up of a registered nurse, , financial resource worker and community health worker who understand the health care system. The goal of clinic care coordination is to help you manage your health and improve access to the health care system. Our team works alongside your provider to assist you in determining your health and social needs. We can help you obtain health care and community resources, providing you with necessary information and education. We can work with you through any barriers and develop a care plan that helps coordinate and strengthen the communication between you and your care team.    Please feel free to contact me with any questions or concerns regarding care coordination and what we can offer.      We are focused on providing you with the highest-quality healthcare experience possible.    Sincerely,     Deysi Cordon, JENNIFER, [facility]  Social Work Care Coordinator

## 2023-01-19 ENCOUNTER — APPOINTMENT (OUTPATIENT)
Dept: CARE COORDINATION | Facility: CLINIC | Age: 38
End: 2023-01-19
Payer: COMMERCIAL

## 2023-01-19 ENCOUNTER — PATIENT OUTREACH (OUTPATIENT)
Dept: CARE COORDINATION | Facility: CLINIC | Age: 38
End: 2023-01-19

## 2023-01-19 NOTE — PROGRESS NOTES
Clinic Care Coordination Contact  Program: Adding   County:  [facility] Case #:  [facility] Worker:   Cedrick #:   Subscriber #:   Renewal:  Date Applied:     FRW Outreach:   23: FRW called pt and we called [facility] and spoke with Rosario. We added  and FRW will touch base with pt in 4 weeks to make sure she has insurance for .   1/10/23: FRW called pt on referral. Pt and FRW will add  on .     Health Insurance:      Referral/Screening:

## 2023-02-06 ENCOUNTER — OFFICE VISIT (OUTPATIENT)
Dept: FAMILY MEDICINE | Facility: CLINIC | Age: 38
End: 2023-02-06
Payer: COMMERCIAL

## 2023-02-06 VITALS
BODY MASS INDEX: 25.25 KG/M2 | OXYGEN SATURATION: 97 % | TEMPERATURE: 97.3 F | HEIGHT: 59 IN | DIASTOLIC BLOOD PRESSURE: 86 MMHG | HEART RATE: 95 BPM | SYSTOLIC BLOOD PRESSURE: 116 MMHG | WEIGHT: 125.25 LBS | RESPIRATION RATE: 16 BRPM

## 2023-02-06 DIAGNOSIS — F51.01 PRIMARY INSOMNIA: ICD-10-CM

## 2023-02-06 DIAGNOSIS — L85.3 DRY SKIN DERMATITIS: ICD-10-CM

## 2023-02-06 DIAGNOSIS — G44.209 TENSION HEADACHE: ICD-10-CM

## 2023-02-06 PROCEDURE — 91313 COVID-19 VACCINE BIVALENT BOOSTER 18+ (MODERNA): CPT | Performed by: FAMILY MEDICINE

## 2023-02-06 PROCEDURE — 0134A COVID-19 VACCINE BIVALENT BOOSTER 18+ (MODERNA): CPT | Performed by: FAMILY MEDICINE

## 2023-02-06 PROCEDURE — 99207 PR POST PARTUM EXAM: CPT | Performed by: FAMILY MEDICINE

## 2023-02-06 PROCEDURE — 99213 OFFICE O/P EST LOW 20 MIN: CPT | Mod: 24 | Performed by: FAMILY MEDICINE

## 2023-02-06 RX ORDER — LANOLIN ALCOHOL/MO/W.PET/CERES
3 CREAM (GRAM) TOPICAL
Qty: 60 TABLET | Refills: 3 | Status: SHIPPED | OUTPATIENT
Start: 2023-02-06 | End: 2024-01-25

## 2023-02-06 RX ORDER — ACETAMINOPHEN 500 MG
500-1000 TABLET ORAL EVERY 6 HOURS PRN
Qty: 100 TABLET | Refills: 3 | Status: SHIPPED | OUTPATIENT
Start: 2023-02-06 | End: 2023-03-01

## 2023-02-06 RX ORDER — IBUPROFEN 600 MG/1
600 TABLET, FILM COATED ORAL EVERY 6 HOURS PRN
Qty: 90 TABLET | Refills: 3 | Status: SHIPPED | OUTPATIENT
Start: 2023-02-06 | End: 2023-03-01

## 2023-02-06 RX ORDER — ACETAMINOPHEN 325 MG/1
325-650 TABLET ORAL EVERY 6 HOURS PRN
COMMUNITY
End: 2023-02-06

## 2023-02-06 NOTE — PROGRESS NOTES
"  Assessment & Plan     Routine postpartum follow-up: doing well overall. Hoping that better pain control will help with headaches and melatonin will help with sleep.     Tension headache: follow up in 2 months to see if she is feeling better.   - acetaminophen (TYLENOL) 500 MG tablet  Dispense: 100 tablet; Refill: 3  - ibuprofen (ADVIL/MOTRIN) 600 MG tablet  Dispense: 90 tablet; Refill: 3    Primary insomnia:tried prescribing but if it is not covered by insurance, she stated she is willing to pay out of pocket. Wrote it down under Patient Instructions for the pharmacist to show her.   - melatonin 3 MG tablet  Dispense: 60 tablet; Refill: 3    Dry skin dermatitis: wrote names of moisturizing creams she can try (Vanicream, Eucerin, Aveeno).      BMI:   Estimated body mass index is 25.28 kg/m  as calculated from the following:    Height as of this encounter: 1.499 m (4' 11.02\").    Weight as of this encounter: 56.8 kg (125 lb 4 oz).       No follow-ups on file.    Marianne Rodriguez MD  [facility] TASNEEM Baird is a 38 year old accompanied by her child, presenting for the following health issues:  Insomnia, Headache, and Rash      History of Present Illness       Headaches:   Since the patient's last clinic visit, headaches are: no change  The patient is getting headaches:  Intermittently daily  She is able to do normal daily activities when she has a migraine.  The patient is taking the following rescue/relief medications:  Tylenol   Patient states \"I get some relief\" from the rescue/relief medications.   The patient is taking the following medications to prevent migraines:  No medications to prevent migraines  In the past 4 weeks, the patient has gone to an Urgent Care or Emergency Room 0 times times due to headaches.        Headache started after having baby. Worse when she doesn't get enough sleep. Her baby often doesn't fall asleep until midnight.     Sometimes when her baby does sleep, " "she has trouble falling asleep, too.     Her mood is doing ok.     She has the norethindrone pills at home. Hasn't started them yet because hasn't been sexually active with  yet but she does plan to start them soon.       Has a rash with itchy skin that started after she gave birth.   Objective    /86   Pulse 95   Temp 97.3  F (36.3  C) (Oral)   Resp 16   Ht 1.499 m (4' 11.02\")   Wt 56.8 kg (125 lb 4 oz)   LMP  (LMP Unknown)   SpO2 97%   Breastfeeding Yes   BMI 25.28 kg/m    Body mass index is 25.28 kg/m .  Physical Exam   GENERAL: healthy, alert and no distress  MS: no gross musculoskeletal defects noted, no edema  SKIN: skin on back and buttocks is xerotic with some excoriations.   NEURO: Normal strength and tone, mentation intact and speech normal  PSYCH: mentation appears normal, affect normal/bright              "

## 2023-02-20 ENCOUNTER — PATIENT OUTREACH (OUTPATIENT)
Dept: CARE COORDINATION | Facility: CLINIC | Age: 38
End: 2023-02-20
Payer: COMMERCIAL

## 2023-02-20 NOTE — PROGRESS NOTES
Clinic Care Coordination Contact  Program: Adding [place]  County:  [facility] Case #:  [facility] Worker:   Cedrick #:   Subscriber #:   Renewal:  Date Applied:     FRW Outreach:   23: FRW called pt. Pt hasn't received insurance for  yet. FRW will wait 2 more weeks and county with pt again.   23: FRW called pt and we called [facility] and spoke with Rosario. We added  and FRW will touch base with pt in 4 weeks to make sure she has insurance for .   1/10/23: FRW called pt on referral. Pt and FRW will add  on .     Health Insurance:      Referral/Screening:

## 2023-02-22 ENCOUNTER — PATIENT OUTREACH (OUTPATIENT)
Dept: CARE COORDINATION | Facility: CLINIC | Age: 38
End: 2023-02-22
Payer: COMMERCIAL

## 2023-02-22 NOTE — PROGRESS NOTES
Clinic Care Coordination Contact     Community Health Worker Follow Up    Care Gaps:   Health Maintenance Due   Topic Date Due     ADVANCE CARE PLANNING  Never done     Care Gaps Last addressed on 2/06/2023.    Care Plan:   Care Plan: Health insurance for new born     Problem: HP GENERAL PROBLEM     Goal: General Goal - please update text     Start Date: 1/18/2023 Expected End Date: 3/30/2023    This Visit's Progress: 30% Recent Progress: 20%    Note:     Barriers: language  Strengths: ability to seek support  Patient expressed understanding of goal: yes.    Action steps to achieve this goal:  1. I will answer the phone and return call to FRW in timely manner.   2. I will provide all information and necessary verifications  3. I will update CCC team regarding this goal.                     Intervention and Education during outreach:  -CHW reminded patient of upcoming appointment with PCP.  -Patient to discuss options for WIC with PCP or go to WIC clinic to ask for change as needed.  -Patient's new born health insurance is pending with [facility] and FRW is assisting.  -Patient is receiving [facility] benefits as qualified.  -No additional coordination assistance needed at this time and patient was informed to call sooner with questions or concerns.     CHW Next Outreach: In one month.

## 2023-03-01 ENCOUNTER — PRENATAL OFFICE VISIT (OUTPATIENT)
Dept: FAMILY MEDICINE | Facility: CLINIC | Age: 38
End: 2023-03-01
Payer: COMMERCIAL

## 2023-03-01 VITALS
BODY MASS INDEX: 25.45 KG/M2 | RESPIRATION RATE: 16 BRPM | HEIGHT: 59 IN | DIASTOLIC BLOOD PRESSURE: 80 MMHG | TEMPERATURE: 97.8 F | WEIGHT: 126.25 LBS | OXYGEN SATURATION: 98 % | SYSTOLIC BLOOD PRESSURE: 110 MMHG | HEART RATE: 97 BPM

## 2023-03-01 DIAGNOSIS — K02.9 TOOTH DECAY: ICD-10-CM

## 2023-03-01 PROCEDURE — 99207 PR POST PARTUM EXAM: CPT | Performed by: FAMILY MEDICINE

## 2023-03-01 RX ORDER — IBUPROFEN 600 MG/1
600 TABLET, FILM COATED ORAL EVERY 6 HOURS PRN
Qty: 90 TABLET | Refills: 3 | Status: SHIPPED | OUTPATIENT
Start: 2023-03-01 | End: 2024-01-25

## 2023-03-01 RX ORDER — ACETAMINOPHEN 500 MG
500-1000 TABLET ORAL EVERY 6 HOURS PRN
Qty: 100 TABLET | Refills: 3 | Status: SHIPPED | OUTPATIENT
Start: 2023-03-01 | End: 2024-01-25

## 2023-03-01 RX ORDER — ACETAMINOPHEN AND CODEINE PHOSPHATE 120; 12 MG/5ML; MG/5ML
0.35 SOLUTION ORAL DAILY
Qty: 84 TABLET | Refills: 4 | Status: SHIPPED | OUTPATIENT
Start: 2023-03-01 | End: 2024-04-05

## 2023-03-01 ASSESSMENT — EDINBURGH POSTNATAL DEPRESSION SCALE (EPDS)
THE THOUGHT OF HARMING MYSELF HAS OCCURRED TO ME: NEVER
I HAVE BEEN SO UNHAPPY THAT I HAVE HAD DIFFICULTY SLEEPING: NOT AT ALL
I HAVE FELT SCARED OR PANICKY FOR NO GOOD REASON: NO, NOT AT ALL
I HAVE BEEN SO UNHAPPY THAT I HAVE HAD DIFFICULTY SLEEPING: NOT AT ALL
I HAVE BEEN SO UNHAPPY THAT I HAVE BEEN CRYING: NO, NEVER
I HAVE FELT SCARED OR PANICKY FOR NO GOOD REASON: NO, NOT AT ALL
TOTAL SCORE: 0
I HAVE LOOKED FORWARD WITH ENJOYMENT TO THINGS: AS MUCH AS I EVER DID
I HAVE FELT SAD OR MISERABLE: NO, NOT AT ALL
THE THOUGHT OF HARMING MYSELF HAS OCCURRED TO ME: NEVER
THINGS HAVE BEEN GETTING ON TOP OF ME: NO, I HAVE BEEN COPING AS WELL AS EVER
I HAVE BEEN ABLE TO LAUGH AND SEE THE FUNNY SIDE OF THINGS: AS MUCH AS I ALWAYS COULD
I HAVE BEEN ANXIOUS OR WORRIED FOR NO GOOD REASON: NO, NOT AT ALL
I HAVE BLAMED MYSELF UNNECESSARILY WHEN THINGS WENT WRONG: NO, NEVER
I HAVE FELT SAD OR MISERABLE: NO, NOT AT ALL
I HAVE BEEN SO UNHAPPY THAT I HAVE BEEN CRYING: NO, NEVER
I HAVE BLAMED MYSELF UNNECESSARILY WHEN THINGS WENT WRONG: NO, NEVER
I HAVE LOOKED FORWARD WITH ENJOYMENT TO THINGS: AS MUCH AS I EVER DID
THINGS HAVE BEEN GETTING ON TOP OF ME: NO, I HAVE BEEN COPING AS WELL AS EVER
I HAVE BEEN ANXIOUS OR WORRIED FOR NO GOOD REASON: NO, NOT AT ALL
I HAVE BEEN ABLE TO LAUGH AND SEE THE FUNNY SIDE OF THINGS: AS MUCH AS I ALWAYS COULD

## 2023-03-01 NOTE — PROGRESS NOTES
SUBJECTIVE: Oli is here for a 6-week postpartum checkup.    Date of Last Pap:  21    Delivery date was 2022. She had a  of a viable boy, with no complications.  Since delivery, she has been breast feeding.  She has no signs of infection, bleeding or other complications.  We discussed contraceptions and she has chosen oral contraceptives.  She  has not had intercourse since delivery. Patient screened for postpartum depression and complaints are NEGATIVE.   In the past, she only took the pills on days when she had intercourse with her . Did that for years.     EXAM:    GENERAL APPEARANCE: healthy, alert and no distress       SKIN: no suspicious lesions or rashes     NEURO: Normal strength and tone, sensory exam grossly normal, mentation intact and speech normal     PSYCH: mentation appears normal. and affect normal/bright     LYMPHATICS: No cervical adenopathy    ASSESSMENT:   Normal postpartum exam after .    PLAN:  Return as needed or at time of next expected pap, pelvic, or breast exam.    Oli was seen today for postpartum care and contraception.    Diagnoses and all orders for this visit:    Routine postpartum follow-up: discussed correct usage of norethindrone and explained she needs to take the pills EVERY DAY in order for them to be effective, and needs to take them at the same time every day. Offered other options such as Depo, IUD or Nexplanon but she only wants to use pills.   -     norethindrone (MICRONOR) 0.35 MG tablet; Take 1 tablet (0.35 mg) by mouth daily    Tooth decay: will assist with scheduling dental appointment

## 2023-03-06 ENCOUNTER — PATIENT OUTREACH (OUTPATIENT)
Dept: CARE COORDINATION | Facility: CLINIC | Age: 38
End: 2023-03-06
Payer: COMMERCIAL

## 2023-03-06 NOTE — PROGRESS NOTES
Clinic Care Coordination Contact  Program: Adding [place]  County:  [facility] Case #:  [facility] Worker:   Cedrick #:   Subscriber #:   Renewal:  Date Applied:     FRW Outreach:   3/6/23: FRW called pt. Pt hasn't received insurance for  yet. FRW will wait 2 more weeks and [facility] with pt again.   23: FRW called pt. Pt hasn't received insurance for  yet. FRW will wait 2 more weeks and [facility] with pt again.   23: FRW called pt and we called [facility] and spoke with Rosario. We added  and FRW will touch base with pt in 4 weeks to make sure she has insurance for .   1/10/23: FRW called pt on referral. Pt and FRW will add  on .     Health Insurance:      Referral/Screening:

## 2023-03-15 ENCOUNTER — PATIENT OUTREACH (OUTPATIENT)
Dept: CARE COORDINATION | Facility: CLINIC | Age: 38
End: 2023-03-15
Payer: COMMERCIAL

## 2023-03-17 ENCOUNTER — PATIENT OUTREACH (OUTPATIENT)
Dept: CARE COORDINATION | Facility: CLINIC | Age: 38
End: 2023-03-17
Payer: COMMERCIAL

## 2023-03-17 NOTE — PROGRESS NOTES
Clinic Care Coordination Contact  Care Coordination Clinician Chart Review    Situation: Patient chart reviewed by Care Coordinator.       Background: Care Coordination Program started: 1/9/2023. Initial assessment completed 1/18/23 and patient-centered care plan(s) were developed with participation from patient. Lead CC handed patient off to CHW for continued outreaches.       Assessment: Per chart review, patient outreach completed by CC CHW on 2/22/23. Patient is actively working to accomplish goal(s). Patient's goal(s) appropriate and relevant at this time.     Patient is not due for updated Plan of Care.      Assessments will be completed annually or as needed/with change of patient status. Due 1/18/24.         Care Plan: Health insurance for new born     Problem: HP GENERAL PROBLEM     Goal: General Goal - please update text     Start Date: 1/18/2023 Expected End Date: 3/30/2023    This Visit's Progress: 30% Recent Progress: 20%    Note:     Barriers: language  Strengths: ability to seek support  Patient expressed understanding of goal: yes.    Action steps to achieve this goal:  1. I will answer the phone and return call to FRW in timely manner.   2. I will provide all information and necessary verifications  3. I will update CCC team regarding this goal.                            Plan/Recommendations: The patient will continue working with Care Coordination to achieve goal(s) as above.     CHW will continue outreaches at minimum every 30 days and will involve Lead CC as needed or if patient is ready to move to Maintenance.     Lead CC will continue to monitor CHW outreaches and patient's progress to goal(s) every 6 weeks.     Plan of Care updated and sent to patient: LEV Nuñez   Social Work Primary Care Clinic Care Coordinator   [facility]

## 2023-03-24 ENCOUNTER — PATIENT OUTREACH (OUTPATIENT)
Dept: CARE COORDINATION | Facility: CLINIC | Age: 38
End: 2023-03-24
Payer: COMMERCIAL

## 2023-03-24 NOTE — PROGRESS NOTES
Clinic Care Coordination Contact  Program: Adding [place]  County:  [facility] Case #:  [facility] Worker:   Cedrick #:   Subscriber #:   Renewal:  Date Applied:     FRW Outreach:   3/24/23: FRW called pt and we called [facility] and [facility] and updated child's name. FRW will call pt in 3 weeks to make sure she has new [place]/[employer] card.   3/6/23: FRW called pt. Pt hasn't received insurance for  yet. FRW will wait 2 more weeks and [facility] with pt again.   23: FRW called pt. Pt hasn't received insurance for  yet. FRW will wait 2 more weeks and [facility] with pt again.   23: FRW called pt and we called [facility] and spoke with Rosario. We added  and FRW will touch base with pt in 4 weeks to make sure she has insurance for .   1/10/23: FRW called pt on referral. Pt and FRW will add  on .     Health Insurance:      Referral/Screening:

## 2023-03-28 ENCOUNTER — PATIENT OUTREACH (OUTPATIENT)
Dept: CARE COORDINATION | Facility: CLINIC | Age: 38
End: 2023-03-28
Payer: COMMERCIAL

## 2023-03-28 NOTE — PROGRESS NOTES
Clinic Care Coordination Contact  [facility]/Voicemail    Clinical Data: Care Coordinator Outreach  Outreach attempted x 1. CHW called and unable to reach and left message on patient's voicemail with call back information and requested return call.    Plan: Care Coordinator will try to reach patient again in two weeks.

## 2023-04-06 ENCOUNTER — OFFICE VISIT (OUTPATIENT)
Dept: FAMILY MEDICINE | Facility: CLINIC | Age: 38
End: 2023-04-06
Payer: COMMERCIAL

## 2023-04-06 VITALS
TEMPERATURE: 97.7 F | SYSTOLIC BLOOD PRESSURE: 108 MMHG | DIASTOLIC BLOOD PRESSURE: 75 MMHG | RESPIRATION RATE: 16 BRPM | OXYGEN SATURATION: 99 % | HEART RATE: 94 BPM | WEIGHT: 130 LBS | BODY MASS INDEX: 26.21 KG/M2 | HEIGHT: 59 IN

## 2023-04-06 DIAGNOSIS — L85.3 DRY SKIN DERMATITIS: ICD-10-CM

## 2023-04-06 DIAGNOSIS — F51.01 PRIMARY INSOMNIA: Primary | ICD-10-CM

## 2023-04-06 DIAGNOSIS — G44.209 TENSION HEADACHE: ICD-10-CM

## 2023-04-06 DIAGNOSIS — H10.13 ALLERGIC CONJUNCTIVITIS, BILATERAL: ICD-10-CM

## 2023-04-06 PROBLEM — N32.81 OVERACTIVE BLADDER: Status: ACTIVE | Noted: 2023-04-06

## 2023-04-06 PROBLEM — R30.0 DYSURIA: Status: RESOLVED | Noted: 2023-01-01 | Resolved: 2023-04-06

## 2023-04-06 PROBLEM — O43.199 MARGINAL INSERTION OF UMBILICAL CORD AFFECTING MANAGEMENT OF MOTHER: Status: RESOLVED | Noted: 2022-08-31 | Resolved: 2023-04-06

## 2023-04-06 PROBLEM — O09.529 ANTEPARTUM MULTIGRAVIDA OF ADVANCED MATERNAL AGE: Status: RESOLVED | Noted: 2022-07-06 | Resolved: 2023-04-06

## 2023-04-06 PROCEDURE — 99213 OFFICE O/P EST LOW 20 MIN: CPT | Performed by: FAMILY MEDICINE

## 2023-04-06 NOTE — PROGRESS NOTES
"  Assessment & Plan     Primary insomnia  Doing well, she did get melatonin but had to pay out of pocket.     Dry skin dermatitis  Better. She doesn't remember which cream she uses.     Tension headache  She thinks only one of her medications is helping and unsure what the other one is for. She did not bring medications today.     Allergic conjunctivitis, bilateral  - ketotifen (ZADITOR) 0.025 % ophthalmic solution  Dispense: 10 mL; Refill: 4      SOCIAL  Patient had a lot of paperwork and questions about her son's paperwork as well. Will make copies and give to CCC to review. Her son never got a social security card. Form filled out and copy of his last wcc printed to include with paperwork. Dad will take to wherever he got the form, but will ask CCC to follow up if needed.      BMI:   Estimated body mass index is 26.26 kg/m  as calculated from the following:    Height as of this encounter: 1.499 m (4' 11\").    Weight as of this encounter: 59 kg (130 lb).   Weight management plan: Discussed healthy diet and exercise guidelines    Return in about 3 months (around 7/6/2023) for Routine preventive.    30 minutes spent on the date of the encounter doing chart review, history and exam, documentation and further activities per the note      Prashanth Johnson MD  [facility] TASNEEM Baird is a 38 year old, presenting for the following health issues:  RECHECK (Follow up insomnia and headaches)    History of Present Illness       Headaches:   Since the patient's last clinic visit, headaches are: improved  The patient is getting headaches:  Only when she doesn't take her medication  She is not able to do normal daily activities when she has a migraine.  The patient is taking the following rescue/relief medications:  Ibuprofen (Advil, Motrin) and other   Patient states \"I get total relief\" from the rescue/relief medications.   The patient is taking the following medications to prevent migraines:  No " "medications to prevent migraines  In the past 4 weeks, the patient has gone to an Urgent Care or Emergency Room 0 times times due to headaches.    Reason for visit:  Insomnia and headaches        Seen for postpartum:   Tension headache: follow up in 2 months to see if she is feeling better.   - acetaminophen (TYLENOL) 500 MG tablet  Dispense: 100 tablet; Refill: 3  - ibuprofen (ADVIL/MOTRIN) 600 MG tablet  Dispense: 90 tablet; Refill: 3  She's feeling better, headaches improved, but she doesn't know which one is working. She did not brin meds with her today.      Primary insomnia:tried prescribing but if it is not covered by insurance, she stated she is willing to pay out of pocket. Wrote it down under Patient Instructions for the pharmacist to show her.   Sleeping better, she has no concerns today.         Review of Systems   Constitutional, HEENT, cardiovascular, pulmonary, gi and gu systems are negative, except as otherwise noted.      Objective    /75   Pulse 94   Temp 97.7  F (36.5  C) (Oral)   Resp 16   Ht 1.499 m (4' 11\")   Wt 59 kg (130 lb)   LMP 03/30/2023 (Approximate)   SpO2 99%   Breastfeeding Yes   BMI 26.26 kg/m    Body mass index is 26.26 kg/m .  Physical Exam   GENERAL: healthy, alert and no distress  NECK: no adenopathy, no asymmetry, masses, or scars and thyroid normal to palpation  RESP: lungs clear to auscultation - no rales, rhonchi or wheezes  CV: regular rate and rhythm, normal S1 S2, no S3 or S4, no murmur, click or rub, no peripheral edema and peripheral pulses strong  ABDOMEN: soft, nontender, no hepatosplenomegaly, no masses and bowel sounds normal  MS: no gross musculoskeletal defects noted, no edema    Admission on 12/31/2022, Discharged on 01/02/2023   Component Date Value Ref Range Status     Treponema Antibody Total 12/31/2022 Nonreactive  Nonreactive Final     ABO/RH(D) 12/31/2022 O POS   Final     Antibody Screen 12/31/2022 Negative  Negative Final     SPECIMEN " EXPIRATION DATE 12/31/2022 [id number]   Final     Hold Specimen 12/31/2022 [facility]   Final     Hemoglobin 01/01/2023 12.1  11.7 - 15.7 g/dL Final     Color Urine 01/01/2023 Orange (A)  Colorless, Straw, Light Yellow, Yellow Final     Appearance Urine 01/01/2023 Turbid (A)  Clear Final     Glucose Urine 01/01/2023 Negative  Negative mg/dL Final     Bilirubin Urine 01/01/2023 Negative  Negative Final     Ketones Urine 01/01/2023 Negative  Negative mg/dL Final     Specific Gravity Urine 01/01/2023 1.013  1.001 - 1.030 Final     Blood Urine 01/01/2023 >1.0 mg/dL (A)  Negative Final     pH Urine 01/01/2023 7.5 (H)  5.0 - 7.0 Final     Protein Albumin Urine 01/01/2023 50 (A)  Negative mg/dL Final     Urobilinogen Urine 01/01/2023 <2.0  <2.0 mg/dL Final     Nitrite Urine 01/01/2023 Negative  Negative Final     Leukocyte Esterase Urine 01/01/2023 500 Choco/uL (A)  Negative Final     Bacteria Urine 01/01/2023 Many (A)  None Seen /HPF Final     WBC Clumps Urine 01/01/2023 Present (A)  None Seen /HPF Final     Mucus Urine 01/01/2023 Present (A)  None Seen /LPF Final     RBC Urine 01/01/2023 >182 (H)  <=2 /HPF Final     WBC Urine 01/01/2023 97 (H)  <=5 /HPF Final     Squamous Epithelials Urine 01/01/2023 2 (H)  <=1 /HPF Final     Culture 01/01/2023 >100,000 CFU/mL Mixture of urogenital gasper   Final     No results found for any visits on 04/06/23.  No results found for this or any previous visit (from the past 24 hour(s)).

## 2023-04-11 ENCOUNTER — PATIENT OUTREACH (OUTPATIENT)
Dept: CARE COORDINATION | Facility: CLINIC | Age: 38
End: 2023-04-11
Payer: COMMERCIAL

## 2023-04-11 NOTE — PROGRESS NOTES
"Clinic Care Coordination Contact  Patient has completed all goals with Clinic Care Coordination.  Please review the chart and confirm if maintenance  is approved.    -Patient received child's new health insurance ID Card with correct name on it but it left out one letter and patient is okay with this. Correct should spell like: Eh Theyu Heraclio and on ID card spells; Eh They Heraclio.  -Per isra review, patient is scheduled for preventive care visit in July 2023 and child is also scheduled for C appointments.   -CHW assisted patient and resubmitted \"Medical Services Provider\" form to [employer] with correct name on form.   -Submitted forms which included child's birth certificate, health insurances cards; [place] Card and [employer] ID card.  -Patient has no additional questions or concerns at this time and patient was informed to call if additional coordination assistance or resources needed.   "

## 2023-04-13 ENCOUNTER — TELEPHONE (OUTPATIENT)
Dept: FAMILY MEDICINE | Facility: CLINIC | Age: 38
End: 2023-04-13
Payer: COMMERCIAL

## 2023-04-13 ENCOUNTER — PATIENT OUTREACH (OUTPATIENT)
Dept: CARE COORDINATION | Facility: CLINIC | Age: 38
End: 2023-04-13
Payer: COMMERCIAL

## 2023-04-13 NOTE — TELEPHONE ENCOUNTER
Prior Authorization Request  Who's requesting:  PHARMACY   Pharmacy Name and Location: [id number] - [place], [place] - [address] [facility] [address]  Medication Name: ketotifen (ZADITOR) 0.025 % ophthalmic solution  Insurance Plan: [facility]   Insurance Member ID Number:  [id number]   CoverMyMeds Key: N/A  Informed patient that prior authorizations can take up to 10 business days for response:   Yes  Okay to leave a detailed message: No

## 2023-04-13 NOTE — PROGRESS NOTES
Clinic Care Coordination Contact  Program: Adding [place]  County:  [facility] Case #:  [facility] Worker:   Cedrick #:   Subscriber #:   Renewal:  Date Applied:     FRW Outreach:   23: FRW called pt to discuss the insurance card for child. FRW left  and will make another outreach next week.   3/24/23: FRW called pt and we called [facility] and [facility] and updated child's name. FRW will call pt in 3 weeks to make sure she has new [place]/[employer]ARE card.   3/6/23: FRW called pt. Pt hasn't received insurance for  yet. FRW will wait 2 more weeks and [facility] with pt again.   23: FRW called pt. Pt hasn't received insurance for  yet. FRW will wait 2 more weeks and [facility] with pt again.   23: FRW called pt and we called [facility] and spoke with Rosario. We added  and FRW will touch base with pt in 4 weeks to make sure she has insurance for .   1/10/23: FRW called pt on referral. Pt and FRW will add  on .     Health Insurance:      Referral/Screening:

## 2023-04-13 NOTE — TELEPHONE ENCOUNTER
Prior Authorization Not Needed per Insurance    Medication: ketotifen (ZADITOR) 0.025 % ophthalmic solution  Insurance Company: [employer] - Phone [phone] Fax [phone]  Expected CoPay:      Pharmacy Filling the Rx: [id number] - [place], [place] - [address] [place] [address] & [address]  Pharmacy Notified: Yes  Patient Notified: No    Pharmacy needs to use a covered NDC.  Pharmacy notified to contact help desk for more information.

## 2023-04-13 NOTE — TELEPHONE ENCOUNTER
Central Prior Authorization Team   Phone: [phone]    PA Initiation    Medication: ketotifen (ZADITOR) 0.025 % ophthalmic solution  Insurance Company: [employer] - Phone [phone] Fax [phone]  Pharmacy Filling the Rx: [facility] #[id number] - [place], [place] - [address]  Filling Pharmacy Phone: [phone]  Filling Pharmacy Fax:    Start Date: 4/13/2023

## 2023-04-19 ENCOUNTER — PATIENT OUTREACH (OUTPATIENT)
Dept: CARE COORDINATION | Facility: CLINIC | Age: 38
End: 2023-04-19
Payer: COMMERCIAL

## 2023-04-19 NOTE — PROGRESS NOTES
Clinic Care Coordination Contact  Program: Adding [place]  [facility] Case #:  [facility] Worker:   Cedrick #:   Subscriber #:   Renewal:  Date Applied:     FRW Outreach:   23: FRW called pt. Pt received insurance card for child. FRW goal completed. 0.00 balance for family account.   23: FRW called pt to discuss the insurance card for child. FRW left  and will make another outreach next week.   3/24/23: FRW called pt and we called [facility] and [employer] and updated child's name. FRW will call pt in 3 weeks to make sure she has new [place]/UCARE card.   3/6/23: FRW called pt. Pt hasn't received insurance for  yet. FRW will wait 2 more weeks and [facility] with pt again.   23: FRW called pt. Pt hasn't received insurance for  yet. FRW will wait 2 more weeks and [facility] with pt again.   23: FRW called pt and we called [facility] and spoke with Rosario. We added  and FRW will touch base with pt in 4 weeks to make sure she has insurance for .   1/10/23: FRW called pt on referral. Pt and FRW will add  on .     Health Insurance:      Referral/Screening:

## 2023-04-24 ENCOUNTER — TELEPHONE (OUTPATIENT)
Dept: OTOLARYNGOLOGY | Facility: CLINIC | Age: 38
End: 2023-04-24
Payer: COMMERCIAL

## 2023-05-02 NOTE — PROGRESS NOTES
Clinic Care Coordination Contact    Patient has completed all goals with Clinic Care Coordination. Maintenance is approved.    LYLY completed work with pt.     LEV Gilman   Social Work Primary Care Clinic Care Coordinator

## 2023-05-12 NOTE — PROGRESS NOTES
ASSESSMENT/PLAN:   Oli was seen today for abdominal pain.    Diagnoses and all orders for this visit:    Congenital atresia of right external ear  Mixed hearing loss, bilateral  Following with ENT.     Gastroesophageal reflux disease without esophagitis  -     omeprazole (PRILOSEC) 40 MG DR capsule; Take 1 capsule (40 mg) by mouth daily    Urticaria  Postnasal discharge  Chronic urticaria  -     Cetirizine (ZYRTEC) 10 MG tablet; Take 1 tablet (10 mg) by mouth 2 times daily    Frequent bowel movements  Low suspicion for infectious cause. No diarrhea - just frequent BM. Labs within last few months are normal, unlikely diabetes. No sick contacts, no recent abx. cdiff low on threshold. Will provide immodium prn, consider stool studies for parasites at next visit if does not resolve on its own.   -     loperamide (IMODIUM A-D) 2 MG tablet; Take 1 tablet (2 mg) by mouth 3 times daily as needed for diarrhea    Urinary frequency  -     UA Macro with Reflex to Micro and Culture - lab collect; Future  -     UA Macro with Reflex to Micro and Culture - lab collect  -     Urine Microscopic    Left foot pain      Return in about 1 month (around 12/11/2021) for Medication Check, bowel movements.       ======================================================    SUBJECTIVE  Oli Mercedes is a 36 year old female here for follow up    Itching is tolerable. She takes 20mg of cetirizine which has taken care of her itching and rashes.insurance doesn't cover that dosing. May need to consider alternating another one like loratidine.     She has been having frequent bowel movements for the past month or so. No recent antibiotics. No diarrhea - just always has to poop, and sometimes in the middle of the night. No fevers, nausea, vomiting, no changes in diet, no recent travel, no one in the house is sick.     ROS  Complete 10 point review of systems negative except as noted above in HPI      OBJECTIVE  /86 (BP Location: Left arm, Patient  "Position: Sitting, Cuff Size: Adult Regular)   Pulse 88   Temp 97.8  F (36.6  C) (Oral)   Ht 1.505 m (4' 11.25\")   Wt 57.6 kg (127 lb)   LMP 11/09/2021   SpO2 97%   BMI 25.43 kg/m       General: Cooperative, pleasant, in no acute distress  HEENT: PERRL, EOMI.  TM normal bilaterally.  Pharynx normal without erythema.  Tonsils normal without hypertrophy or exudates.  Neck: no lymphadenopathy, no masses  CV: RRR, normal S1/S2, no murmur, rubs, gallops  Resp: No respiratory distress. Clear to auscultation bilaterally. No wheezes, rales, rhonchi  Abd: Nontender, nondistended, bowel sounds present  Ext: radial/pedal pulses +2 bilaterally  MSK: Normal muscle tone  Neuro: CN II-XII intact  Skin: warm, well perfused. No rashes  Psych: No suicidal or homicidal ideations, no self-harm.  Normal affect.    LABS & IMAGES   Results for orders placed or performed in visit on 11/11/21   UA Macro with Reflex to Micro and Culture - lab collect     Status: Abnormal    Specimen: Urine, Clean Catch   Result Value Ref Range    Color Urine Yellow Colorless, Straw, Light Yellow, Yellow    Appearance Urine Clear Clear    Glucose Urine Negative Negative mg/dL    Bilirubin Urine Negative Negative    Ketones Urine Negative Negative mg/dL    Specific Gravity Urine 1.020 1.005 - 1.030    Blood Urine Negative Negative    pH Urine 6.5 5.0 - 7.0    Protein Albumin Urine Negative Negative mg/dL    Urobilinogen Urine 0.2 0.2, 1.0 E.U./dL    Nitrite Urine Negative Negative    Leukocyte Esterase Urine Small (A) Negative   Urine Microscopic     Status: Abnormal   Result Value Ref Range    Bacteria Urine None Seen None Seen /HPF    RBC Urine None Seen 0-2 /HPF /HPF    WBC Urine 0-5 0-5 /HPF /HPF    Squamous Epithelials Urine Moderate (A) None Seen /LPF    WBC Clumps Urine Present (A) None Seen /HPF    Mucus Urine Present (A) None Seen /LPF    Narrative    Urine Culture not indicated "         ======================================================    Visit was completed along with Zainab dunn    Options for treatment and follow-up care were reviewed with the patient. Say Daren and/or guardian was engaged and actively involved in the decision making process. Say Daren and/or guardian verbalized understanding of the options discussed and was satisfied with the final plan.      Prashanth Johnson MD         show

## 2023-06-13 ENCOUNTER — PATIENT OUTREACH (OUTPATIENT)
Dept: CARE COORDINATION | Facility: CLINIC | Age: 38
End: 2023-06-13
Payer: COMMERCIAL

## 2023-06-13 NOTE — PROGRESS NOTES
Clinic Care Coordination Contact  Patient has completed all goals with Clinic Care Coordination.  Please review the chart and confirm if graduation is approved.    -Per patient, she's doing well and has no new concern.  -Per patient, she's receiving [email or web address] benefits for family as qualified and child is receiving WIC.   -CHW reminded patient of preventive care visit on 7/06/2023 with PCP and patient plans to attend.   -Patient did not address or establish new goal on today follow up outreach.  -Patient was informed to call with questions or concerns.   -CCC SW to further review chart and advise.

## 2023-06-13 NOTE — LETTER
[facility] CARE COORDINATION    June 22, 2023    Say Daren [address] [place] [address]    Dear Say,    Your Care Team congratulates you on your journey to maintain wellness. This document will help guide you on your journey to maintain a healthy lifestyle.  You can use this to help you overcome any barriers you may encounter.  If you should have any questions or concerns, you can contact the members of your Care Team or contact your Primary Care Clinic for assistance.     Health Maintenance  Health Maintenance Reviewed:    Health Maintenance Due   Topic Date Due    ADVANCE CARE PLANNING  Never done    YEARLY PREVENTIVE VISIT  06/23/2022     My Access Plan  Medical Emergency 911   Primary Clinic Line [facility] [phone]   24 Hour Appointment Line [phone] or  [phone]) (toll-free)   24 Hour Nurse Line [phone] (toll-free)   Preferred Urgent Care     Preferred Hospital     [id number] - [place], [place] - [address]     Behavioral Health Crisis Line The National Suicide Prevention Lifeline at 1-900.442.2191 or 911     My Care Team Members  Patient Care Team         Relationship Specialty Notifications Start End    Prashanth Johnson MD PCP - General Family Practice  4/28/21     Phone: [phone] Fax: [phone]         [address] [place] [address]    Prashanth Johnson MD Assigned PCP   6/16/21     Phone: [phone] Fax: [phone]         [address] [place] [address]    Madeleine Valdez ARM worker   12/6/21     [facility]; [email or web address]    Phone: [phone]         Karla Light MD MD Otolaryngology  4/14/22     Phone: [phone] Fax: [phone]         [address] [address]    Karla Light MD MD Otolaryngology  4/14/22     Phone: [phone] Fax: [phone] [address]  [place] [address]    Karla Light MD Assigned Surgical Provider   7/16/22     Phone: [phone] Fax: [phone]         [address] [facility] [address] [facility] [address]    Terrence Hawk Community Health Worker Primary Care - CC Admissions 1/9/23 6/22/23    Phone: [phone] Fax: [phone]         [address] [place] [address] 1 [facility] [address]    Deysi Cordon [facility] Lead Care Coordinator  Admissions 1/10/23 6/22/23                 Goals         COMPLETED: Psychosocial (pt-stated)       Goal statement: I will establish [facility] services within the next 90 days.    Date goal set: 10/20/2021  Barriers: language barrier, lack of knowledge/support  Strengths: Willing to accept support  Date to achieve by: 1/202022  Patient expressed understanding of goal: Yes     Personal Plan:   1.  I am now working with [facility] for supportive services and the primary person who is helping me right now is Heavenly Valdez at [phone] and I will contact Heavenly when I have questions or concerns related to my financial concerns, resources, and other psychosocial concerns.                   It has been your Clinic Care Team's pleasure to work with you on your goals.    Regards,    Your Clinic Care Team

## 2023-06-22 NOTE — PROGRESS NOTES
Clinic Care Coordination Contact    Assessment: W Care Coordinator contacted patient for 2 month follow up. Patient has continued to follow the plan of care and assessment is negative for any new needs or concerns.    Enrollment status: Graduated     Plan: No further outreaches at this time. Patient will continue to follow the plan of care. If new needs arise a new Care Coordination referral may be placed. Letter sent.     Geneva Mann CARMELINA   Social Work Primary Care Clinic Care Coordinator

## 2023-08-07 ENCOUNTER — OFFICE VISIT (OUTPATIENT)
Dept: FAMILY MEDICINE | Facility: CLINIC | Age: 38
End: 2023-08-07
Payer: COMMERCIAL

## 2023-08-07 VITALS
OXYGEN SATURATION: 96 % | DIASTOLIC BLOOD PRESSURE: 78 MMHG | BODY MASS INDEX: 25.76 KG/M2 | HEART RATE: 91 BPM | RESPIRATION RATE: 14 BRPM | HEIGHT: 59 IN | WEIGHT: 127.8 LBS | SYSTOLIC BLOOD PRESSURE: 111 MMHG | TEMPERATURE: 98.8 F

## 2023-08-07 DIAGNOSIS — R12 HEARTBURN: ICD-10-CM

## 2023-08-07 DIAGNOSIS — H10.13 ALLERGIC CONJUNCTIVITIS, BILATERAL: ICD-10-CM

## 2023-08-07 DIAGNOSIS — R35.0 URINARY FREQUENCY: ICD-10-CM

## 2023-08-07 DIAGNOSIS — Z91.09 ENVIRONMENTAL ALLERGIES: Primary | ICD-10-CM

## 2023-08-07 PROCEDURE — 99214 OFFICE O/P EST MOD 30 MIN: CPT | Performed by: FAMILY MEDICINE

## 2023-08-07 RX ORDER — OLOPATADINE HYDROCHLORIDE 1 MG/ML
1 SOLUTION/ DROPS OPHTHALMIC 2 TIMES DAILY
Qty: 5 ML | Refills: 4 | Status: SHIPPED | OUTPATIENT
Start: 2023-08-07 | End: 2024-01-25

## 2023-08-07 RX ORDER — CETIRIZINE HYDROCHLORIDE 10 MG/1
10 TABLET ORAL DAILY
Qty: 90 TABLET | Refills: 3 | Status: SHIPPED | OUTPATIENT
Start: 2023-08-07 | End: 2024-01-25

## 2023-08-07 ASSESSMENT — ENCOUNTER SYMPTOMS: HEADACHES: 1

## 2023-08-07 NOTE — PROGRESS NOTES
Assessment & Plan     Environmental allergies  - cetirizine (ZYRTEC) 10 MG tablet  Dispense: 90 tablet; Refill: 3    Heartburn  Ok to take prn, but recommend lower dose of 20mg  - omeprazole (PRILOSEC) 20 MG DR capsule  Dispense: 30 capsule; Refill: 3    Urinary frequency/urgency, stress incontinence  Chronic, not new (reviewed note from 12/21/21), no new symptoms concerning for UTI. Breast feeding, so would not advise medication. Recommend pelvic floor PT, referral to urology if not improving.   - Physical Therapy Referral    Allergic conjunctivitis, bilateral  - olopatadine (PATANOL) 0.1 % ophthalmic solution  Dispense: 5 mL; Refill: 4    Headaches  Menstrual cramping  Recommend ibuprofen 600mg prn headaches, also consider scheduling tid for the first 2 days of menses to manage cramps    Reviewed her medications today and clarified for her what each bottle was for, but she states she has other bottles at home. Recommend she return for med rec visit and bring ALL bottles from home.                  Siena Bolanos MD  [facility] Baird is a 38 year old, presenting for the following health issues:  Headache and itchy eyes         8/7/2023     8:43 AM   Additional Questions   Roomed by johnathon BARNARD     Eye irritation - itching, sometimes red. Used 's prescription eye drops, helps a little but did not go away. Had rx eye drops ketotifen. Helped but ran out. Breast feeding    Stomach burning - occasional, mild. Has an old rx for omeprazole 40mg, wonders if ok to take while breastfeeding    Headaches - with menses. Affects sleep. Taking Micronor. Long history of intermittent headaches, this is not new or worse. Also had bad menstrual cramping.     Requests refill of zyrtec, given for itching - helps, but wants to know if ok to take if breastfeeding    Med questions:   - ibuprofen- brings an old bottle of 200mg rx. Med list includes 600mg tablets rx from March.     -  "Loperamide - asks what this is for (prescribed Feb 2022). Was looking for a med for urinary frequency. No diarrhea. Stopped while pregnant, wants to know if she can take while breastfeeding. Long term symptom, not new, no symptoms of infection. Some stress incontinences. Wiling to do pelvic floor PT but hard toget to appts and know where to go in buildings w language barrier            Review of Systems   Neurological:  Positive for headaches.            Objective    /78   Pulse 91   Temp 98.8  F (37.1  C) (Oral)   Resp 14   Ht 1.497 m (4' 10.94\")   Wt 58 kg (127 lb 12.8 oz)   LMP 07/24/2023 (Approximate)   SpO2 96%   Breastfeeding Yes   BMI 25.87 kg/m    Body mass index is 25.87 kg/m .  Physical Exam   GENERAL: healthy, alert and no distress  EYES: Eyes grossly normal to inspection, PERRL and conjunctivae and sclerae normal  HENT: ear canals and TM's normal, nose and mouth without ulcers or lesions  NECK: no adenopathy, no asymmetry, masses, or scars and thyroid normal to palpation  RESP: lungs clear to auscultation - no rales, rhonchi or wheezes  CV: regular rate and rhythm, normal S1 S2, no S3 or S4, no murmur, click or rub, no peripheral edema and peripheral pulses strong  ABDOMEN: soft, nontender, no hepatosplenomegaly, no masses and bowel sounds normal  MS: no gross musculoskeletal defects noted, no edema  Neuro\" alert and oriented, grossly nonfocal                      "

## 2023-08-21 ENCOUNTER — OFFICE VISIT (OUTPATIENT)
Dept: FAMILY MEDICINE | Facility: CLINIC | Age: 38
End: 2023-08-21
Payer: COMMERCIAL

## 2023-08-21 VITALS
HEART RATE: 100 BPM | WEIGHT: 127.75 LBS | HEIGHT: 59 IN | SYSTOLIC BLOOD PRESSURE: 112 MMHG | RESPIRATION RATE: 16 BRPM | BODY MASS INDEX: 25.76 KG/M2 | OXYGEN SATURATION: 98 % | DIASTOLIC BLOOD PRESSURE: 78 MMHG | TEMPERATURE: 97.7 F

## 2023-08-21 DIAGNOSIS — K52.9 FREQUENT STOOLS: ICD-10-CM

## 2023-08-21 DIAGNOSIS — R12 HEARTBURN: ICD-10-CM

## 2023-08-21 DIAGNOSIS — R10.13 ABDOMINAL PAIN, EPIGASTRIC: ICD-10-CM

## 2023-08-21 DIAGNOSIS — Z51.81 MEDICATION MONITORING ENCOUNTER: Primary | ICD-10-CM

## 2023-08-21 DIAGNOSIS — G44.219 EPISODIC TENSION-TYPE HEADACHE, NOT INTRACTABLE: ICD-10-CM

## 2023-08-21 LAB
ALBUMIN SERPL BCG-MCNC: 4.5 G/DL (ref 3.5–5.2)
ALP SERPL-CCNC: 83 U/L (ref 35–104)
ALT SERPL W P-5'-P-CCNC: 37 U/L (ref 0–50)
ANION GAP SERPL CALCULATED.3IONS-SCNC: 11 MMOL/L (ref 7–15)
AST SERPL W P-5'-P-CCNC: 30 U/L (ref 0–45)
BASOPHILS # BLD AUTO: 0 10E3/UL (ref 0–0.2)
BASOPHILS NFR BLD AUTO: 1 %
BILIRUB SERPL-MCNC: 0.3 MG/DL
BUN SERPL-MCNC: 8.7 MG/DL (ref 6–20)
CALCIUM SERPL-MCNC: 9 MG/DL (ref 8.6–10)
CHLORIDE SERPL-SCNC: 105 MMOL/L (ref 98–107)
CREAT SERPL-MCNC: 0.53 MG/DL (ref 0.51–0.95)
CRP SERPL-MCNC: <3 MG/L
DEPRECATED HCO3 PLAS-SCNC: 21 MMOL/L (ref 22–29)
EOSINOPHIL # BLD AUTO: 0.2 10E3/UL (ref 0–0.7)
EOSINOPHIL NFR BLD AUTO: 3 %
ERYTHROCYTE [DISTWIDTH] IN BLOOD BY AUTOMATED COUNT: 12.7 % (ref 10–15)
GFR SERPL CREATININE-BSD FRML MDRD: >90 ML/MIN/1.73M2
GLUCOSE SERPL-MCNC: 124 MG/DL (ref 70–99)
HCT VFR BLD AUTO: 43.5 % (ref 35–47)
HGB BLD-MCNC: 14.3 G/DL (ref 11.7–15.7)
IMM GRANULOCYTES # BLD: 0 10E3/UL
IMM GRANULOCYTES NFR BLD: 0 %
LYMPHOCYTES # BLD AUTO: 2.8 10E3/UL (ref 0.8–5.3)
LYMPHOCYTES NFR BLD AUTO: 32 %
MCH RBC QN AUTO: 29.1 PG (ref 26.5–33)
MCHC RBC AUTO-ENTMCNC: 32.9 G/DL (ref 31.5–36.5)
MCV RBC AUTO: 88 FL (ref 78–100)
MONOCYTES # BLD AUTO: 0.8 10E3/UL (ref 0–1.3)
MONOCYTES NFR BLD AUTO: 10 %
NEUTROPHILS # BLD AUTO: 4.7 10E3/UL (ref 1.6–8.3)
NEUTROPHILS NFR BLD AUTO: 55 %
PLATELET # BLD AUTO: 318 10E3/UL (ref 150–450)
POTASSIUM SERPL-SCNC: 3.9 MMOL/L (ref 3.4–5.3)
PROT SERPL-MCNC: 7.8 G/DL (ref 6.4–8.3)
RBC # BLD AUTO: 4.92 10E6/UL (ref 3.8–5.2)
SODIUM SERPL-SCNC: 137 MMOL/L (ref 136–145)
VIT B12 SERPL-MCNC: 446 PG/ML (ref 232–1245)
WBC # BLD AUTO: 8.5 10E3/UL (ref 4–11)

## 2023-08-21 PROCEDURE — 85025 COMPLETE CBC W/AUTO DIFF WBC: CPT | Performed by: FAMILY MEDICINE

## 2023-08-21 PROCEDURE — 86140 C-REACTIVE PROTEIN: CPT | Performed by: FAMILY MEDICINE

## 2023-08-21 PROCEDURE — 36415 COLL VENOUS BLD VENIPUNCTURE: CPT | Performed by: FAMILY MEDICINE

## 2023-08-21 PROCEDURE — 80053 COMPREHEN METABOLIC PANEL: CPT | Performed by: FAMILY MEDICINE

## 2023-08-21 PROCEDURE — 99214 OFFICE O/P EST MOD 30 MIN: CPT | Performed by: FAMILY MEDICINE

## 2023-08-21 PROCEDURE — 82607 VITAMIN B-12: CPT | Performed by: FAMILY MEDICINE

## 2023-08-21 RX ORDER — PNV NO.95/FERROUS FUM/FOLIC AC 28MG-0.8MG
1 TABLET ORAL DAILY
Qty: 90 TABLET | Refills: 4 | Status: SHIPPED | OUTPATIENT
Start: 2023-08-21 | End: 2024-09-04

## 2023-08-21 RX ORDER — OMEPRAZOLE 40 MG/1
40 CAPSULE, DELAYED RELEASE ORAL DAILY PRN
Qty: 90 CAPSULE | Refills: 1 | Status: SHIPPED | OUTPATIENT
Start: 2023-08-21 | End: 2024-01-25

## 2023-08-21 NOTE — PROGRESS NOTES
Assessment & Plan     Heartburn, epigastric pain  She states omeprazole 20mg did not control her symptoms. Will go back to previous dose of 40mg. Long term PPI, will check B12  - omeprazole (PRILOSEC) 40 MG DR capsule  Dispense: 90 capsule; Refill: 1  - Comprehensive metabolic panel (BMP + Alb, Alk Phos, ALT, AST, Total. Bili, TP)  - CBC with platelets and differential  - Vitamin B12    Medication monitoring encounter  Spent a significant time trying to sort out her medications and figure out what she is actually taking. She had confusion about the multiple medications and what they are for. To simplify things for her (with her permission), I combined duplicate bottles, disposed of old or not current medications and extra bottles, and shredded labels and threw away empty unlabeled bottles. At her request, she kept her bottle of loperamide (only 3 capsules left), but I advised she NOT take while breast feeding. We also reviewed max dose of acetaminophen and ibuprofen. Of note, she did not have her birth control pills with her, but stated repeatedly that she is taking them.   - Comprehensive metabolic panel (BMP + Alb, Alk Phos, ALT, AST, Total. Bili, TP)  - CBC with platelets and differential  - Vitamin B12    Breast feeding status of mother  She states she stopped PNV after delivery. She was advised today to continue while breast feeding.   - Prenatal Vit-Fe Fumarate-FA (PRENATAL VITAMIN) 27-0.8 MG TABS  Dispense: 90 tablet; Refill: 4    Frequent stools  Since at least fall of 2021. She denies blood in stool and states stools are not loose/denies diarrhea. Reports multiple small Bms between 4am-8am. Recommend she see GI.   - CRP, inflammation  - Adult GI  Referral - Consult Only    Headaches  It is possible she is using ibuprofen 600mg bid nearly every day based on her bottle fill date and quantity. Could be med overuse headache. Could be related to progesterone birth control, or could be chronic issue  (multiple previous visits for headaches when reviewing chart). Advised her to try to decrease ibuprofen use. Briefly discussed med for prevention but this is complicated by breast feeding status. Should first try to decrease ibuprofen overuse.    Over 30 minutes spent on day of service interviewing patient using phone , reviewing medications, looking up pill IDs to match pills in bottles, reviewing chart, and counseling/educating patient on current rxs, and documenting.                  Siena Bolanos MD  [facility] TASNEEM Baird is a 38 year old, presenting for the following health issues:  Recheck Medication        8/21/2023     9:39 AM   Additional Questions   Roomed by Abbie BOWER       History of Present Illness       Reason for visit:  Medication check      Patient here to follow up from last visit, when she was confused about her medications and what they are for. She did not have all of her medications with her at her last visit, so I requested she return for med check appointment with all of her prescriptions.     She brings multiple rx bottles, most of which are old - from 2021 or 2022. Omeprazole 20mg, 40mg, cetirizine 10mg, ibuprofen 200mg, 600mg, acetaminophen 500mg (states she takes up to 4 at a time), loperamide, montelukast, PNV. She has several duplicates. Two of the pill bottles have the wrong pills in them when I compare descriptions written on bottle to actual pills. She does not know what the meds are or why they are in the wrong bottles. With the imprints on the pills, I am able to identify them.     Patient has 2 types of pain she manages with acetaminophen and ibuprofen - headaches, menstrual cramping.     She has epigastric or RUQ pain for which she is using omeprazole. At her recent visit, I tried to decrease her omeprazole from 40mg to 20mg but her symptoms have not been adequately controlled on 20mg and she wants to return to 40mg      Patient  "reports frequent stools, multiple from 4am-8am x 2 years, no blood. Not necessarily loose. Wants to take loperamide, states it has helped in the past, states she was on it prior to pregnancy. At her last visit, she was advised not to take if breast feeding. She states she has not used it while breast feeding, but later states that she has. Review of chart shows loperamide rx was given in Nov 2021, but if symptoms continued, provider wanted to get stool studies.             Review of Systems         Objective    /78   Pulse 100   Temp 97.7  F (36.5  C) (Oral)   Resp 16   Ht 1.497 m (4' 10.94\")   Wt 57.9 kg (127 lb 12 oz)   LMP 07/24/2023 (Approximate)   SpO2 98%   BMI 25.85 kg/m    Body mass index is 25.85 kg/m .  Physical Exam   Gen: NAD, well appearing                          "

## 2023-08-22 ENCOUNTER — TELEPHONE (OUTPATIENT)
Dept: FAMILY MEDICINE | Facility: CLINIC | Age: 38
End: 2023-08-22

## 2023-08-22 NOTE — TELEPHONE ENCOUNTER
Called pt and relayed message. Pt verbalized understanding.      Yoel Shane Cem Say, BSN RN  [facility]      ----- Message from Siena Bolanos MD sent at 8/21/2023  8:19 PM CDT -----  Please call w results - blood tests are normal. I would like her to see a GI specialist for frequent stools. When I review her chart from back in 2021 when the symptoms started, Dr. Johnson wanted to evaluate this symptom further if it continued. Referral placed, she should get a call to schedule.

## 2023-08-25 ENCOUNTER — TELEPHONE (OUTPATIENT)
Dept: GASTROENTEROLOGY | Facility: CLINIC | Age: 38
End: 2023-08-25
Payer: COMMERCIAL

## 2023-08-25 NOTE — TELEPHONE ENCOUNTER
Health Call Center    Phone Message    May a detailed message be left on voicemail: yes     Reason for Call: Appointment Intake    Referring Provider Name:   Diagnosis and/or Symptoms: Frequent stools [K52.9]     Per triage notes, patient is to be seen as a GI Urgent, but is currently scheduled on 11/01/2023 with Dr. Jeanne Kemp. Current appointment is outside requested time frame. Please review for further follow up per scheduling guidelines. Thanks!     Action Taken: Message routed to:  [facility] GI    Travel Screening: Not Applicable

## 2023-08-28 NOTE — TELEPHONE ENCOUNTER
Writer and  called and talked with Pt. Pt is rescheduled to see Kelin Hopper on 9/21/2023 at 11am for an in-person clinic visit.

## 2023-09-15 ENCOUNTER — TELEPHONE (OUTPATIENT)
Dept: GASTROENTEROLOGY | Facility: CLINIC | Age: 38
End: 2023-09-15
Payer: COMMERCIAL

## 2023-09-15 NOTE — TELEPHONE ENCOUNTER
Called via  line to remind patient of their upcoming appointment with our GI clinic, on Thursday 9/21/2023 at 10:45AM with Kelin Hopper. This appointment is scheduled as an in-person appt. Please arrive 15 minutes early to check in for your appointment. , if your appointment is virtual (video or telephone) you need to be in [place] for the visit. To reschedule or cancel appointment patient needs to call [phone] option 1.  To confirm appointment patient advised to call back.     Jessica Almeida MA

## 2023-09-20 NOTE — TELEPHONE ENCOUNTER
REFERRAL INFORMATION:  Referring Provider:  Siena Bolanos MD  Referring Clinic:  [facility]   Reason for Visit/Diagnosis: Frequent stools [K52.9]     FUTURE VISIT INFORMATION:  Appointment Date: 9/21/23  Appointment Time: 11:00 AM      NOTES STATUS DETAILS   OFFICE NOTE from Referring Provider Internal 8/21/23 - Siena Bolanos MD - [facility] - Frequent stools; epigastric abdominal pain    OFFICE NOTE from Other Specialist Internal 11/23/22, 10/26/22 - Marianne Rodriguez MD - [facility] - prenatal care;  blood in stool     12/21/21, 11/11/21- Prashanth Johnson MD - [facility] - Loos stool    1/30/20, 1/2/20, 11/6/19 - Dominic Owens MD - [facility] - Blood in Stool; H Pylori    MEDICATION LIST Internal         STOOL TESTING Internal 1/3/20 - H Pylori antigen   PERTINENT LABS Internal 8/21/23 - most recent labs    IMAGING (CT, MRI, EGD, MRCP, Small Bowel Follow Through/SBT, MR/CT Enterography) Internal XR Chest - 9/1/20

## 2023-09-21 ENCOUNTER — TELEPHONE (OUTPATIENT)
Dept: GASTROENTEROLOGY | Facility: CLINIC | Age: 38
End: 2023-09-21

## 2023-09-21 ENCOUNTER — PRE VISIT (OUTPATIENT)
Dept: GASTROENTEROLOGY | Facility: CLINIC | Age: 38
End: 2023-09-21

## 2023-09-21 ENCOUNTER — OFFICE VISIT (OUTPATIENT)
Dept: GASTROENTEROLOGY | Facility: CLINIC | Age: 38
End: 2023-09-21
Attending: FAMILY MEDICINE
Payer: COMMERCIAL

## 2023-09-21 VITALS
BODY MASS INDEX: 26 KG/M2 | OXYGEN SATURATION: 99 % | WEIGHT: 129 LBS | HEART RATE: 105 BPM | HEIGHT: 59 IN | SYSTOLIC BLOOD PRESSURE: 117 MMHG | DIASTOLIC BLOOD PRESSURE: 86 MMHG

## 2023-09-21 DIAGNOSIS — R19.5 HARD STOOL: ICD-10-CM

## 2023-09-21 DIAGNOSIS — K52.9 FREQUENT STOOLS: Primary | ICD-10-CM

## 2023-09-21 DIAGNOSIS — R35.0 URINARY FREQUENCY: ICD-10-CM

## 2023-09-21 PROCEDURE — 99204 OFFICE O/P NEW MOD 45 MIN: CPT | Performed by: PHYSICIAN ASSISTANT

## 2023-09-21 RX ORDER — POLYETHYLENE GLYCOL 3350 17 G/17G
POWDER, FOR SOLUTION ORAL
Qty: 510 G | Refills: 0 | Status: SHIPPED | OUTPATIENT
Start: 2023-09-21 | End: 2023-10-26

## 2023-09-21 ASSESSMENT — PAIN SCALES - GENERAL: PAINLEVEL: NO PAIN (0)

## 2023-09-21 NOTE — PROGRESS NOTES
"  GI CLINIC VISIT    CC/REFERRING MD:  Siena Bolanos  REASON FOR CONSULTATION: K52.9 (ICD-10-CM) - Frequent stools     Chart Review  8/21 - frequent stools from 4-8a x 2 years   Taken loperamide which has helped; instructed not to take if breast feeding.   Patient has 2 types of pain she manages with acetaminophen and ibuprofen - headaches, menstrual cramping  She has epigastric or RUQ pain for which she is using omeprazole. At her recent visit, I tried to decrease her omeprazole from 40mg to 20mg but her symptoms have not been adequately controlled on 20mg and she wants to return to 40mg  CBC and CMP - WNL. Glucose 124  CRP WNL    HPI  Say Daren is a 38 year old year old female with PMHx of overactive bladder, hemorrhoids, slowed transit constipation presenting to establish care with the [facility] [place] GI group for frequent stools.    1. Shares from 6a-10a, she has a BM once every hour. She is on toilet for 10-15 min trying to pass a BM. She denies straining. 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 She denies loose or soft stools (I described and pointed at BSC type 5-7). She tells me this stooling pattern has been present for a long time. Stools are brown in coloration.   She also does have lower abdominal \"discomfort\" that has been present for at least 1-2 years, it come and goes. I am not sure if it is present now (she did not answer the question) but when it is present, she describes it as mild.   She does not know of any triggers or alleviating factors for this pain. When directly asked, she denies any relationship of this pain with eating or having a BM - however despite professional  services,questions had to be asked multiple times in order to get a response; and at times her response did not address or answer the question. Essentially I am not certain if she fully understood the questioning.   She has 4 children, all vaginal births. Unclear if she has had any " Pt verbalized understanding of DC instructions. Pt discharged in good condition with all personal belongings. Pt ambulatory with balanced, steady gait to exit.      complications such as tears. She is breast feeding currently.     2. She wants to have her urine checked. Tells me she is going frequently and has had UTI. She denies fever, chills. Appetite is stable.   Tells me she has pressure to lower abdomen.     3. She is taking omeprazole. Shares it manages her reflux. She denies dysphagia or odynphagia. She denies upper abd pain, particularly to epigastric region.     Wt Readings from Last 10 Encounters:   09/21/23 58.5 kg (129 lb)   08/21/23 57.9 kg (127 lb 12 oz)   08/07/23 58 kg (127 lb 12.8 oz)   04/06/23 59 kg (130 lb)   03/01/23 57.3 kg (126 lb 4 oz)   02/06/23 56.8 kg (125 lb 4 oz)   12/26/22 61.9 kg (136 lb 8 oz)   12/21/22 61.7 kg (136 lb)   12/14/22 61.5 kg (135 lb 8 oz)   12/07/22 61.2 kg (135 lb)     ROS  Per HPI     Family Hx  No other known family history or GI related malignancy (esophageal, gastric, pancreatic, liver or colon) or family history of IBD/celiac disease.     Social Hx   Lactating   ? use of NSAIDs or Tylenol. She denied use when I asked but per PCP note, she takes ibuprofen use   ? use of OTC herbal supplements/weight loss products.        PROBLEM LIST  Patient Active Problem List    Diagnosis Date Noted    Overactive bladder 04/06/2023     Priority: Medium    External hemorrhoids 10/26/2022     Priority: Medium    Slow transit constipation 10/26/2022     Priority: Medium    Language barrier affecting health care 12/13/2021     Priority: Medium    Microtia of right ear 11/18/2021     Priority: Medium    Congenital atresia of right external ear 11/11/2021     Priority: Medium    Mixed hearing loss, bilateral 11/11/2021     Priority: Medium    Urticaria 08/12/2021     Priority: Medium       PERTINENT PAST MEDICAL HISTORY:  Past Medical History:   Diagnosis Date    Migraines     Urinary tract infection        PREVIOUS SURGERIES:  No past surgical history on file.    ALLERGIES:     Allergies   Allergen Reactions    Food      Eggplants--makes her skin  itchy       PERTINENT MEDICATIONS:    Current Outpatient Medications:     acetaminophen (TYLENOL) 500 MG tablet, Take 1-2 tablets (500-1,000 mg) by mouth every 6 hours as needed for mild pain, Disp: 100 tablet, Rfl: 3    cetirizine (ZYRTEC) 10 MG tablet, Take 1 tablet (10 mg) by mouth daily, Disp: 90 tablet, Rfl: 3    ibuprofen (ADVIL/MOTRIN) 600 MG tablet, Take 1 tablet (600 mg) by mouth every 6 hours as needed for moderate pain (4-6), Disp: 90 tablet, Rfl: 3    melatonin 3 MG tablet, Take 1 tablet (3 mg) by mouth nightly as needed for sleep, Disp: 60 tablet, Rfl: 3    norethindrone (MICRONOR) 0.35 MG tablet, Take 1 tablet (0.35 mg) by mouth daily, Disp: 84 tablet, Rfl: 4    olopatadine (PATANOL) 0.1 % ophthalmic solution, Place 1 drop into both eyes 2 times daily, Disp: 5 mL, Rfl: 4    omeprazole (PRILOSEC) 40 MG DR capsule, Take 1 capsule (40 mg) by mouth daily as needed (heartburn), Disp: 90 capsule, Rfl: 1    Prenatal Vit-Fe Fumarate-FA (PRENATAL VITAMIN) 27-0.8 MG TABS, Take 1 tablet by mouth daily, Disp: 90 tablet, Rfl: 4    SOCIAL HISTORY:  Social History     Socioeconomic History    Marital status:      Spouse name: Not on file    Number of children: 3    Years of education: 0    Highest education level: Never attended school   Occupational History    Not on file   Tobacco Use    Smoking status: Never     Passive exposure: Never    Smokeless tobacco: Former   Vaping Use    Vaping Use: Never used   Substance and Sexual Activity    Alcohol use: Never    Drug use: Never    Sexual activity: Yes     Partners: Male   Other Topics Concern    Not on file   Social History Narrative    Her children are ages 9, 8, and 3 (as of 2020).      Social Determinants of Health     Financial Resource Strain: Low Risk  (10/20/2021)    Overall Financial Resource Strain (CARDIA)     Difficulty of Paying Living Expenses: Not very hard   Food Insecurity: No Food Insecurity (10/20/2021)    Hunger Vital Sign     Worried About  Running Out of Food in the Last Year: Never true     Ran Out of Food in the Last Year: Never true   Transportation Needs: Unmet Transportation Needs (10/20/2021)    PRAPARE - Transportation     Lack of Transportation (Medical): Yes     Lack of Transportation (Non-Medical): Yes   Physical Activity: Insufficiently Active (10/20/2021)    Exercise Vital Sign     Days of Exercise per Week: 3 days     Minutes of Exercise per Session: 10 min   Stress: Stress Concern Present (1/18/2023)    [employer] - Occupational Stress Questionnaire     Feeling of Stress : To some extent   Social Connections: Socially Integrated (1/18/2023)    Social Connection and Isolation Panel [NHANES]     Frequency of Communication with Friends and Family: Twice a week     Frequency of Social Gatherings with Friends and Family: Twice a week     Attends [employer] Services: 1 to 4 times per year     Active Member of Clubs or Organizations: No     Attends Club or Organization Meetings: 1 to 4 times per year     Marital Status:    Interpersonal Safety: Not At Risk (10/20/2021)    Humiliation, Afraid, Rape, and Kick questionnaire     Fear of Current or Ex-Partner: No     Emotionally Abused: No     Physically Abused: No     Sexually Abused: No   Housing Stability: Low Risk  (10/20/2021)    Housing Stability Vital Sign     Unable to Pay for Housing in the Last Year: No     Number of Places Lived in the Last Year: 1     Unstable Housing in the Last Year: No       FAMILY HISTORY:  Family History   Problem Relation Age of Onset    Hypertension Mother     Diabetes Mother     Hypertension Father     No Known Problems Brother     No Known Problems Sister     No Known Problems Son     No Known Problems Daughter     No Known Problems Daughter        Past/family/social history reviewed and no changes    PHYSICAL EXAMINATION:  Vitals reviewed: LMP 07/24/2023 (Approximate)   Wt:   Wt Readings from Last 2 Encounters:   08/21/23  57.9 kg (127 lb 12 oz)   08/07/23 58 kg (127 lb 12.8 oz)        Constitutional: aaox3, cooperative, pleasant, not dyspneic/diaphoretic, no acute distress. Appears younger than stated aged.   Eyes: Sclera anicteric/injected  Ears/nose/mouth/throat: hearing intact  Neck: supple, active ROM w/o limitation or pain   CV: No edema  Respiratory: Unlabored breathing  Abd:  Nondistended, +bs, no hepatosplenomegaly, nontender to light and deep palpation, no peritoneal signs, neg De Los Santos's sign. Dullness to percussion to lower abdomen. NEG CVA tenderness b/l   Skin: warm, perfused, no jaundice  Psych: Normal affect  MSK: Normal gait     PERTINENT STUDIES:    Office Visit on 08/21/2023   Component Date Value Ref Range Status    Sodium 08/21/2023 137  136 - 145 mmol/L Final    Potassium 08/21/2023 3.9  3.4 - 5.3 mmol/L Final    Chloride 08/21/2023 105  98 - 107 mmol/L Final    Carbon Dioxide (CO2) 08/21/2023 21 (L)  22 - 29 mmol/L Final    Anion Gap 08/21/2023 11  7 - 15 mmol/L Final    Urea Nitrogen 08/21/2023 8.7  6.0 - 20.0 mg/dL Final    Creatinine 08/21/2023 0.53  0.51 - 0.95 mg/dL Final    Calcium 08/21/2023 9.0  8.6 - 10.0 mg/dL Final    Glucose 08/21/2023 124 (H)  70 - 99 mg/dL Final    Alkaline Phosphatase 08/21/2023 83  35 - 104 U/L Final    AST 08/21/2023 30  0 - 45 U/L Final    ALT 08/21/2023 37  0 - 50 U/L Final    Protein Total 08/21/2023 7.8  6.4 - 8.3 g/dL Final    Albumin 08/21/2023 4.5  3.5 - 5.2 g/dL Final    Bilirubin Total 08/21/2023 0.3  <=1.2 mg/dL Final    GFR Estimate 08/21/2023 >90  >60 mL/min/1.73m2 Final    Vitamin B12 08/21/2023 446  232 - 1,245 pg/mL Final    WBC Count 08/21/2023 8.5  4.0 - 11.0 10e3/uL Final    RBC Count 08/21/2023 4.92  3.80 - 5.20 10e6/uL Final    Hemoglobin 08/21/2023 14.3  11.7 - 15.7 g/dL Final    Hematocrit 08/21/2023 43.5  35.0 - 47.0 % Final    MCV 08/21/2023 88  78 - 100 fL Final    MCH 08/21/2023 29.1  26.5 - 33.0 pg Final    MCHC 08/21/2023 32.9  31.5 - 36.5 g/dL Final     RDW 08/21/2023 12.7  10.0 - 15.0 % Final    Platelet Count 08/21/2023 318  150 - 450 10e3/uL Final    % Neutrophils 08/21/2023 55  % Final    % Lymphocytes 08/21/2023 32  % Final    % Monocytes 08/21/2023 10  % Final    % Eosinophils 08/21/2023 3  % Final    % Basophils 08/21/2023 1  % Final    % Immature Granulocytes 08/21/2023 0  % Final    Absolute Neutrophils 08/21/2023 4.7  1.6 - 8.3 10e3/uL Final    Absolute Lymphocytes 08/21/2023 2.8  0.8 - 5.3 10e3/uL Final    Absolute Monocytes 08/21/2023 0.8  0.0 - 1.3 10e3/uL Final    Absolute Eosinophils 08/21/2023 0.2  0.0 - 0.7 10e3/uL Final    Absolute Basophils 08/21/2023 0.0  0.0 - 0.2 10e3/uL Final    Absolute Immature Granulocytes 08/21/2023 0.0  <=0.4 10e3/uL Final    CRP Inflammation 08/21/2023 <3.00  <5.00 mg/L Final        Lab Results   Component Value Date    WBC 8.5 08/21/2023    WBC 9.3 07/06/2022    WBC 7.1 06/23/2021    HGB 14.3 08/21/2023    HGB 12.1 01/01/2023    HGB 11.8 09/28/2022     08/21/2023     07/06/2022     06/23/2021    CHOL 206 (H) 06/23/2021    TRIG 224 (H) 06/23/2021    HDL 41 (L) 06/23/2021    ALT 37 08/21/2023    ALT 36 06/23/2021    AST 30 08/21/2023    AST 25 06/23/2021     08/21/2023     06/23/2021     03/06/2020    BUN 8.7 08/21/2023    BUN 7 (L) 06/23/2021    BUN 9 03/06/2020    CO2 21 (L) 08/21/2023    CO2 22 06/23/2021    CO2 27 03/06/2020    TSH 1.84 06/23/2021        Liver Function Studies -   Recent Labs   Lab Test 08/21/23  1116   PROTTOTAL 7.8   ALBUMIN 4.5   BILITOTAL 0.3   ALKPHOS 83   AST 30   ALT 37        PREVIOUS ENDOSCOPY    No results found for this or any previous visit.    ASSESSMENT/PLAN:  Oli Mercedes is a 38 year old year old female with PMHx significant for overactive bladder, hemorrhoids, slowed transit constipation presenting to establish care with the [facility] group for frequent stools who presents with the  [facility] team for frequent stools described as 4 episodes of  BSC type 3 associated with lower abdominal pain.  She has 4 children, all vaginal deliveries and is currently lactating. Labs include normal CBC, CMP (glucose 124), and normal CRP drawn in last month.       #freq stools  #BSC type 3  Presenting symptoms are most consistent with under-treated constipation however the differential includes IBS-C (especially with abdominal pain component), pelvic floor dysfunction, and hypothyroidism vs other   -KUB for eval of stool burden   -start low dose miralax, per AVS. Further instructions pending XR result  -check TSH, celiac with IgA (though less likely to be the cause)  -as history is unclear (PCP note stating soft stools patient stating hard stools, language barrier), will check fecal calpro   -was planning to recommend squatty potty but got caught up in logistics of check out process. Will plan to recommend squatty potty once labs come through and we contact patient     Future consideration  Scheduled fiber  Due to language barrier, consultation and evaluation with pelvic floor center will be difficult     #Reflux  Controlled on daily omperazole 40 mg without breakthrough or alarm sx. Recently she had dose increased .   -cont follow up with primary     Future consideration  If she gets off PPI, consider h. Pylori stool antigen; though she denies epigastric pain or dyspeptic symptoms today     #frequent urination   In setting of overactive bladder in problem list.   Exam benign, neg CVA tenderness, hemodynamically stable  -check UA, per patient request      Orders Placed This Encounter   Procedures    X-ray Abdomen 1 vw    TSH with free T4 reflex    Tissue transglutaminase jay IgA and IgG    IgA    Calprotectin Feces    Routine UA with micro reflex to culture        Colorectal cancer screening: aged 44    RTC 4 weeks for at least one more follow-up. If overall doing well, OK to follow up with primary for management.     Thank you for this consultation.  It was a pleasure to  participate in the care of this patient; please contact me with any further questions.     Kelin Hopper PA-C    Follow up: As planned above. Today, I personally spent 30 minutes in direct face to face time with the patient, of which greater than 50% of the time was spent in patient education and counseling as described above. Approximately 15 minutes were spent on indirect care associated with the patient's consultation including but not limited to review of: patient medical records to date, clinic visits, hospital records, lab results, imaging studies, procedural documentation, and coordinating care with other providers. The findings from this review are summarized in the above note. All of the above accounted for a cumulative time of 45 minutes and was performed on the date of service.

## 2023-09-21 NOTE — NURSING NOTE
"Chief Complaint   Patient presents with    New Patient       Vitals:    09/21/23 1115   BP: 117/86   Pulse: 105   SpO2: 99%   Weight: 58.5 kg (129 lb)   Height: 1.499 m (4' 11\")       Body mass index is 26.05 kg/m .    Joanie Logic    "

## 2023-09-21 NOTE — TELEPHONE ENCOUNTER
After the pt was done with her visit, she came to the pod K, I was ready to schedule her appointments per Kelin Hopper.     I contacted the  line to help with the scheduling. At that time the pt refused to schedule any appointments due to lack of transportation and her not knowing her way around.     Pt also wanted to get escorted as she didn't know which way to go.

## 2023-09-21 NOTE — LETTER
"    9/21/2023         RE: Say Daren  [address] [place] [address]        Dear Colleague,    Thank you for referring your patient, Say Daren, to the [facility] GASTROENTEROLOGY CLINIC [place]. Please see a copy of my visit note below.      GI CLINIC VISIT    CC/REFERRING MD:  Siena Bolanos  REASON FOR CONSULTATION: K52.9 (ICD-10-CM) - Frequent stools     Chart Review  8/21 - frequent stools from 4-8a x 2 years   Taken loperamide which has helped; instructed not to take if breast feeding.   Patient has 2 types of pain she manages with acetaminophen and ibuprofen - headaches, menstrual cramping  She has epigastric or RUQ pain for which she is using omeprazole. At her recent visit, I tried to decrease her omeprazole from 40mg to 20mg but her symptoms have not been adequately controlled on 20mg and she wants to return to 40mg  CBC and CMP - WNL. Glucose 124  CRP WNL    HPI  Say Daren is a 38 year old year old female with PMHx of overactive bladder, hemorrhoids, slowed transit constipation presenting to establish care with the [facility] GI group for frequent stools.    1. Shares from 6a-10a, she has a BM once every hour. She is on toilet for 10-15 min trying to pass a BM. She denies straining. 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 She denies loose or soft stools (I described and pointed at BSC type 5-7). She tells me this stooling pattern has been present for a long time. Stools are brown in coloration.   She also does have lower abdominal \"discomfort\" that has been present for at least 1-2 years, it come and goes. I am not sure if it is present now (she did not answer the question) but when it is present, she describes it as mild.   She does not know of any triggers or alleviating factors for this pain. When directly asked, she denies any relationship of this pain with eating or having a BM - however despite professional  services,questions had to be " asked multiple times in order to get a response; and at times her response did not address or answer the question. Essentially I am not certain if she fully understood the questioning.   She has 4 children, all vaginal births. Unclear if she has had any complications such as tears. She is breast feeding currently.     2. She wants to have her urine checked. Tells me she is going frequently and has had UTI. She denies fever, chills. Appetite is stable.   Tells me she has pressure to lower abdomen.     3. She is taking omeprazole. Shares it manages her reflux. She denies dysphagia or odynphagia. She denies upper abd pain, particularly to epigastric region.     Wt Readings from Last 10 Encounters:   09/21/23 58.5 kg (129 lb)   08/21/23 57.9 kg (127 lb 12 oz)   08/07/23 58 kg (127 lb 12.8 oz)   04/06/23 59 kg (130 lb)   03/01/23 57.3 kg (126 lb 4 oz)   02/06/23 56.8 kg (125 lb 4 oz)   12/26/22 61.9 kg (136 lb 8 oz)   12/21/22 61.7 kg (136 lb)   12/14/22 61.5 kg (135 lb 8 oz)   12/07/22 61.2 kg (135 lb)     ROS  Per HPI     Family Hx  No other known family history or GI related malignancy (esophageal, gastric, pancreatic, liver or colon) or family history of IBD/celiac disease.     Social Hx   Lactating   ? use of NSAIDs or Tylenol. She denied use when I asked but per PCP note, she takes ibuprofen use   ? use of OTC herbal supplements/weight loss products.        PROBLEM LIST  Patient Active Problem List    Diagnosis Date Noted    Overactive bladder 04/06/2023     Priority: Medium    External hemorrhoids 10/26/2022     Priority: Medium    Slow transit constipation 10/26/2022     Priority: Medium    Language barrier affecting health care 12/13/2021     Priority: Medium    Microtia of right ear 11/18/2021     Priority: Medium    Congenital atresia of right external ear 11/11/2021     Priority: Medium    Mixed hearing loss, bilateral 11/11/2021     Priority: Medium    Urticaria 08/12/2021     Priority: Medium        PERTINENT PAST MEDICAL HISTORY:  Past Medical History:   Diagnosis Date    Migraines     Urinary tract infection        PREVIOUS SURGERIES:  No past surgical history on file.    ALLERGIES:     Allergies   Allergen Reactions    Food      Eggplants--makes her skin itchy       PERTINENT MEDICATIONS:    Current Outpatient Medications:     acetaminophen (TYLENOL) 500 MG tablet, Take 1-2 tablets (500-1,000 mg) by mouth every 6 hours as needed for mild pain, Disp: 100 tablet, Rfl: 3    cetirizine (ZYRTEC) 10 MG tablet, Take 1 tablet (10 mg) by mouth daily, Disp: 90 tablet, Rfl: 3    ibuprofen (ADVIL/MOTRIN) 600 MG tablet, Take 1 tablet (600 mg) by mouth every 6 hours as needed for moderate pain (4-6), Disp: 90 tablet, Rfl: 3    melatonin 3 MG tablet, Take 1 tablet (3 mg) by mouth nightly as needed for sleep, Disp: 60 tablet, Rfl: 3    norethindrone (MICRONOR) 0.35 MG tablet, Take 1 tablet (0.35 mg) by mouth daily, Disp: 84 tablet, Rfl: 4    olopatadine (PATANOL) 0.1 % ophthalmic solution, Place 1 drop into both eyes 2 times daily, Disp: 5 mL, Rfl: 4    omeprazole (PRILOSEC) 40 MG DR capsule, Take 1 capsule (40 mg) by mouth daily as needed (heartburn), Disp: 90 capsule, Rfl: 1    Prenatal Vit-Fe Fumarate-FA (PRENATAL VITAMIN) 27-0.8 MG TABS, Take 1 tablet by mouth daily, Disp: 90 tablet, Rfl: 4    SOCIAL HISTORY:  Social History     Socioeconomic History    Marital status:      Spouse name: Not on file    Number of children: 3    Years of education: 0    Highest education level: Never attended school   Occupational History    Not on file   Tobacco Use    Smoking status: Never     Passive exposure: Never    Smokeless tobacco: Former   Vaping Use    Vaping Use: Never used   Substance and Sexual Activity    Alcohol use: Never    Drug use: Never    Sexual activity: Yes     Partners: Male   Other Topics Concern    Not on file   Social History Narrative    Her children are ages 9, 8, and 3 (as of 2020).      Social  Determinants of Health     Financial Resource Strain: Low Risk  (10/20/2021)    Overall Financial Resource Strain (CARDIA)     Difficulty of Paying Living Expenses: Not very hard   Food Insecurity: No Food Insecurity (10/20/2021)    Hunger Vital Sign     Worried About Running Out of Food in the Last Year: Never true     Ran Out of Food in the Last Year: Never true   Transportation Needs: Unmet Transportation Needs (10/20/2021)    PRAPARE - Transportation     Lack of Transportation (Medical): Yes     Lack of Transportation (Non-Medical): Yes   Physical Activity: Insufficiently Active (10/20/2021)    Exercise Vital Sign     Days of Exercise per Week: 3 days     Minutes of Exercise per Session: 10 min   Stress: Stress Concern Present (1/18/2023)    [employer] - Occupational Stress Questionnaire     Feeling of Stress : To some extent   Social Connections: Socially Integrated (1/18/2023)    Social Connection and Isolation Panel [NHANES]     Frequency of Communication with Friends and Family: Twice a week     Frequency of Social Gatherings with Friends and Family: Twice a week     Attends Sabianism Services: 1 to 4 times per year     Active Member of Clubs or Organizations: No     Attends Club or Organization Meetings: 1 to 4 times per year     Marital Status:    Interpersonal Safety: Not At Risk (10/20/2021)    Humiliation, Afraid, Rape, and Kick questionnaire     Fear of Current or Ex-Partner: No     Emotionally Abused: No     Physically Abused: No     Sexually Abused: No   Housing Stability: Low Risk  (10/20/2021)    Housing Stability Vital Sign     Unable to Pay for Housing in the Last Year: No     Number of Places Lived in the Last Year: 1     Unstable Housing in the Last Year: No       FAMILY HISTORY:  Family History   Problem Relation Age of Onset    Hypertension Mother     Diabetes Mother     Hypertension Father     No Known Problems Brother     No Known Problems Sister     No  Known Problems Son     No Known Problems Daughter     No Known Problems Daughter        Past/family/social history reviewed and no changes    PHYSICAL EXAMINATION:  Vitals reviewed: LMP 07/24/2023 (Approximate)   Wt:   Wt Readings from Last 2 Encounters:   08/21/23 57.9 kg (127 lb 12 oz)   08/07/23 58 kg (127 lb 12.8 oz)        Constitutional: aaox3, cooperative, pleasant, not dyspneic/diaphoretic, no acute distress. Appears younger than stated aged.   Eyes: Sclera anicteric/injected  Ears/nose/mouth/throat: hearing intact  Neck: supple, active ROM w/o limitation or pain   CV: No edema  Respiratory: Unlabored breathing  Abd:  Nondistended, +bs, no hepatosplenomegaly, nontender to light and deep palpation, no peritoneal signs, neg De Los Santos's sign. Dullness to percussion to lower abdomen. NEG CVA tenderness b/l   Skin: warm, perfused, no jaundice  Psych: Normal affect  MSK: Normal gait     PERTINENT STUDIES:    Office Visit on 08/21/2023   Component Date Value Ref Range Status    Sodium 08/21/2023 137  136 - 145 mmol/L Final    Potassium 08/21/2023 3.9  3.4 - 5.3 mmol/L Final    Chloride 08/21/2023 105  98 - 107 mmol/L Final    Carbon Dioxide (CO2) 08/21/2023 21 (L)  22 - 29 mmol/L Final    Anion Gap 08/21/2023 11  7 - 15 mmol/L Final    Urea Nitrogen 08/21/2023 8.7  6.0 - 20.0 mg/dL Final    Creatinine 08/21/2023 0.53  0.51 - 0.95 mg/dL Final    Calcium 08/21/2023 9.0  8.6 - 10.0 mg/dL Final    Glucose 08/21/2023 124 (H)  70 - 99 mg/dL Final    Alkaline Phosphatase 08/21/2023 83  35 - 104 U/L Final    AST 08/21/2023 30  0 - 45 U/L Final    ALT 08/21/2023 37  0 - 50 U/L Final    Protein Total 08/21/2023 7.8  6.4 - 8.3 g/dL Final    Albumin 08/21/2023 4.5  3.5 - 5.2 g/dL Final    Bilirubin Total 08/21/2023 0.3  <=1.2 mg/dL Final    GFR Estimate 08/21/2023 >90  >60 mL/min/1.73m2 Final    Vitamin B12 08/21/2023 446  232 - 1,245 pg/mL Final    WBC Count 08/21/2023 8.5  4.0 - 11.0 10e3/uL Final    RBC Count 08/21/2023 4.92   3.80 - 5.20 10e6/uL Final    Hemoglobin 08/21/2023 14.3  11.7 - 15.7 g/dL Final    Hematocrit 08/21/2023 43.5  35.0 - 47.0 % Final    MCV 08/21/2023 88  78 - 100 fL Final    MCH 08/21/2023 29.1  26.5 - 33.0 pg Final    MCHC 08/21/2023 32.9  31.5 - 36.5 g/dL Final    RDW 08/21/2023 12.7  10.0 - 15.0 % Final    Platelet Count 08/21/2023 318  150 - 450 10e3/uL Final    % Neutrophils 08/21/2023 55  % Final    % Lymphocytes 08/21/2023 32  % Final    % Monocytes 08/21/2023 10  % Final    % Eosinophils 08/21/2023 3  % Final    % Basophils 08/21/2023 1  % Final    % Immature Granulocytes 08/21/2023 0  % Final    Absolute Neutrophils 08/21/2023 4.7  1.6 - 8.3 10e3/uL Final    Absolute Lymphocytes 08/21/2023 2.8  0.8 - 5.3 10e3/uL Final    Absolute Monocytes 08/21/2023 0.8  0.0 - 1.3 10e3/uL Final    Absolute Eosinophils 08/21/2023 0.2  0.0 - 0.7 10e3/uL Final    Absolute Basophils 08/21/2023 0.0  0.0 - 0.2 10e3/uL Final    Absolute Immature Granulocytes 08/21/2023 0.0  <=0.4 10e3/uL Final    CRP Inflammation 08/21/2023 <3.00  <5.00 mg/L Final        Lab Results   Component Value Date    WBC 8.5 08/21/2023    WBC 9.3 07/06/2022    WBC 7.1 06/23/2021    HGB 14.3 08/21/2023    HGB 12.1 01/01/2023    HGB 11.8 09/28/2022     08/21/2023     07/06/2022     06/23/2021    CHOL 206 (H) 06/23/2021    TRIG 224 (H) 06/23/2021    HDL 41 (L) 06/23/2021    ALT 37 08/21/2023    ALT 36 06/23/2021    AST 30 08/21/2023    AST 25 06/23/2021     08/21/2023     06/23/2021     03/06/2020    BUN 8.7 08/21/2023    BUN 7 (L) 06/23/2021    BUN 9 03/06/2020    CO2 21 (L) 08/21/2023    CO2 22 06/23/2021    CO2 27 03/06/2020    TSH 1.84 06/23/2021        Liver Function Studies -   Recent Labs   Lab Test 08/21/23  1116   PROTTOTAL 7.8   ALBUMIN 4.5   BILITOTAL 0.3   ALKPHOS 83   AST 30   ALT 37        PREVIOUS ENDOSCOPY    No results found for this or any previous visit.    ASSESSMENT/PLAN:  Oli Mercedes is a 38 year old  year old female with PMHx significant for overactive bladder, hemorrhoids, slowed transit constipation presenting to establish care with the [facility] GI group for frequent stools who presents with the  Physicians GI team for frequent stools described as 4 episodes of BSC type 3 associated with lower abdominal pain.  She has 4 children, all vaginal deliveries and is currently lactating. Labs include normal CBC, CMP (glucose 124), and normal CRP drawn in last month.       #freq stools  #BSC type 3  Presenting symptoms are most consistent with under-treated constipation however the differential includes IBS-C (especially with abdominal pain component), pelvic floor dysfunction, and hypothyroidism vs other   -KUB for eval of stool burden   -start low dose miralax, per AVS. Further instructions pending XR result  -check TSH, celiac with IgA (though less likely to be the cause)  -as history is unclear (PCP note stating soft stools patient stating hard stools, language barrier), will check fecal calpro   -was planning to recommend squatty potty but got caught up in logistics of check out process. Will plan to recommend squatty potty once labs come through and we contact patient     Future consideration  Scheduled fiber  Due to language barrier, consultation and evaluation with pelvic floor center will be difficult     #Reflux  Controlled on daily omperazole 40 mg without breakthrough or alarm sx. Recently she had dose increased .   -cont follow up with primary     Future consideration  If she gets off PPI, consider h. Pylori stool antigen; though she denies epigastric pain or dyspeptic symptoms today     #frequent urination   In setting of overactive bladder in problem list.   Exam benign, neg CVA tenderness, hemodynamically stable  -check UA, per patient request      Orders Placed This Encounter   Procedures    X-ray Abdomen 1 vw    TSH with free T4 reflex    Tissue transglutaminase jay IgA and IgG    IgA    Calprotectin  Feces    Routine UA with micro reflex to culture        Colorectal cancer screening: aged 44    RTC 4 weeks for at least one more follow-up. If overall doing well, OK to follow up with primary for management.     Thank you for this consultation.  It was a pleasure to participate in the care of this patient; please contact me with any further questions.     Kelin Hopper PA-C    Follow up: As planned above. Today, I personally spent 30 minutes in direct face to face time with the patient, of which greater than 50% of the time was spent in patient education and counseling as described above. Approximately 15 minutes were spent on indirect care associated with the patient's consultation including but not limited to review of: patient medical records to date, clinic visits, hospital records, lab results, imaging studies, procedural documentation, and coordinating care with other providers. The findings from this review are summarized in the above note. All of the above accounted for a cumulative time of 45 minutes and was performed on the date of service.           Again, thank you for allowing me to participate in the care of your patient.      Sincerely,    Kelin Hopper PA-C

## 2023-09-21 NOTE — PATIENT INSTRUCTIONS
Labs and stool studies today  X-ray today   Start 1/4- 1/2 cap miralax a day - this will help the hard stools    Follow up with primary care doctor or 4 weeks with us (for one more time)

## 2023-09-26 ENCOUNTER — TELEPHONE (OUTPATIENT)
Dept: FAMILY MEDICINE | Facility: CLINIC | Age: 38
End: 2023-09-26
Payer: COMMERCIAL

## 2023-09-26 NOTE — TELEPHONE ENCOUNTER
Patient was seen recently in GI and had labs and abdominal xray ordered. I see she did not have those tests done. Would she like to schedule them at [facility]?

## 2023-09-28 NOTE — TELEPHONE ENCOUNTER
Called pt and informed her transportation has dariana set up for her.      Yoel Baird, BSN RN  [facility]

## 2023-10-05 ENCOUNTER — TELEPHONE (OUTPATIENT)
Dept: GASTROENTEROLOGY | Facility: CLINIC | Age: 38
End: 2023-10-05
Payer: COMMERCIAL

## 2023-10-05 NOTE — TELEPHONE ENCOUNTER
Spoke with patient w/ assistance of  services and scheduled the follow-up order. They are scheduled for an in person visit with Kelin Hopper on 10/26/23, slot okay per provider. Provided scheduling numbers to patient for them to call and schedule lab and imaging.

## 2023-10-16 ENCOUNTER — TELEPHONE (OUTPATIENT)
Dept: GASTROENTEROLOGY | Facility: CLINIC | Age: 38
End: 2023-10-16
Payer: COMMERCIAL

## 2023-10-16 NOTE — TELEPHONE ENCOUNTER
Called to remind patient of their upcoming appointment with our GI clinic, on Thursday, October 26th at 3:00pm with Kelin Hopper. This appointment is scheduled as an in-person appt. Please arrive 15 minutes early to check in for your appointment. , if your appointment is virtual (video or telephone) you need to be in [place] for the visit. To reschedule or cancel patient to call [phone].    Joanie Salazar

## 2023-10-24 ENCOUNTER — OFFICE VISIT (OUTPATIENT)
Dept: OTOLARYNGOLOGY | Facility: CLINIC | Age: 38
End: 2023-10-24
Payer: COMMERCIAL

## 2023-10-24 DIAGNOSIS — Q16.1 CONGENITAL ATRESIA OF RIGHT EXTERNAL EAR: Primary | ICD-10-CM

## 2023-10-24 DIAGNOSIS — Q17.2 MICROTIA OF RIGHT EAR: ICD-10-CM

## 2023-10-24 PROCEDURE — 99213 OFFICE O/P EST LOW 20 MIN: CPT | Mod: 25 | Performed by: OTOLARYNGOLOGY

## 2023-10-24 PROCEDURE — 92504 EAR MICROSCOPY EXAMINATION: CPT | Performed by: OTOLARYNGOLOGY

## 2023-10-24 NOTE — LETTER
10/24/2023       RE: Oli Mercedes  [address] [place] [address]     Dear Colleague,    Thank you for referring your patient, Oli Mercedes, to the [facility] EAR NOSE AND THROAT CLINIC [place] at [facility]. Please see a copy of my visit note below.      Neurotology Clinic      Name: Oli Mercedes  MRN: [id number]  Age: 36 year old  : 1985  Referring provider: Dr. 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 Consultation was originally kindly requested by Dr. Tony Pardo. The patient reports no useable hearing on the right and has not had any history of surgery on this ear, dizziness, or vertigo. The ear has suffered recurrent drainage. We discussed surgery to widen the soft tissue opening and to explore the canal to determine if additional middle ear work was needed.  She was referred to and saw Dr. Morgan Elizalde to discuss additional considerations regarding the auricle for this or future procedures. She cleans the ear daily with a variety of implements, as she has for many years.     She is bothered by itchiness in the canal. She has not been seen in some time as she was pregnant and delivered, and deferred surgery.      Review of Systems:   Pertinent items are noted in HPI or as in patient entered ROS below, remainder of complete ROS is negative.       2022     7:59 AM    ENT ROS   Neurology Headache   Ears, Nose, Throat Hearing loss      Active Medications:     Current Outpatient Medications:      acetaminophen (TYLENOL) 500 MG tablet, Take 1-2 tablets (500-1,000 mg) by mouth every 6 hours as needed for mild pain, Disp: 100 tablet, Rfl: 3     cetirizine (ZYRTEC) 10 MG tablet, Take 1 tablet (10 mg) by mouth daily, Disp: 90 tablet, Rfl: 3     ibuprofen (ADVIL/MOTRIN) 600 MG tablet, Take 1 tablet (600 mg) by  mouth every 6 hours as needed for moderate pain (4-6), Disp: 90 tablet, Rfl: 3     melatonin 3 MG tablet, Take 1 tablet (3 mg) by mouth nightly as needed for sleep, Disp: 60 tablet, Rfl: 3     norethindrone (MICRONOR) 0.35 MG tablet, Take 1 tablet (0.35 mg) by mouth daily, Disp: 84 tablet, Rfl: 4     olopatadine (PATANOL) 0.1 % ophthalmic solution, Place 1 drop into both eyes 2 times daily, Disp: 5 mL, Rfl: 4     omeprazole (PRILOSEC) 40 MG DR capsule, Take 1 capsule (40 mg) by mouth daily as needed (heartburn), Disp: 90 capsule, Rfl: 1     polyethylene glycol (MIRALAX) 17 GM/Dose powder, 1/2 cap to 1 cap daily, or use as directed, Disp: 510 g, Rfl: 0     Prenatal Vit-Fe Fumarate-FA (PRENATAL VITAMIN) 27-0.8 MG TABS, Take 1 tablet by mouth daily, Disp: 90 tablet, Rfl: 4      Allergies:   Food      Past Medical History:  Past Medical History:   Diagnosis Date     Migraines      Urinary tract infection      Patient Active Problem List   Diagnosis     Urticaria     Congenital atresia of right external ear     Mixed hearing loss, bilateral     Microtia of right ear     Language barrier affecting health care     External hemorrhoids     Slow transit constipation     Overactive bladder      Past Surgical History:  No past surgical history on file.    Family History:   Family History   Problem Relation Age of Onset     Hypertension Mother      Diabetes Mother      Hypertension Father      No Known Problems Brother      No Known Problems Sister      No Known Problems Son      No Known Problems Daughter      No Known Problems Daughter          Social History:   Social History     Tobacco Use     Smoking status: Never     Passive exposure: Never     Smokeless tobacco: Former   Vaping Use     Vaping Use: Never used   Substance Use Topics     Alcohol use: Never     Drug use: Never      Physical Exam:   There were no vitals taken for this visit.     Constitutional:  The patient was accompanied by her daughter, well-groomed, and  in no acute distress.     Skin: Normal:  warm and pink without rash   Neurologic: Alert and oriented x 3.  CN's III-XII within normal limits.  Voice normal.    Psychiatric: The patient's affect was calm, cooperative, and appropriate.     Communication:  Normal; communicates verbally, normal voice quality.   Respiratory: Breathing comfortably without stridor or exertion of accessory muscles.    Eyes: Pupils were equal and reactive.  Extraocular movement intact.     Ears: Pinnae and tragus non-tender.  EAC's and TM's were evaluated, results below.        Otologic microscope exam:   Right ear: Microtia, small superior remnant, tag at the level of the lobule, and posterior to that is a small opening. This just admits a #7 suction at the opening. This is dry - no drainage. Beyond the opening, there is an ear canal.  The lining appears healthy today, but I cannot perceive the most medial aspect because it is too narrow, but I removed some moist squamous debris from the far lateral aspect but there was none medially.      Left ear: Grade I microtia. There is a through and through hole in the auricle posterior to the triangular fossa. Ear canal is patent. The TM is intact, but looks like it has either been repaired or remodeled from infections posteriorly.    Audiogram:  AUDIOGRAM: She underwent an audiogram last year.   This demonstrated:   Right profound loss - question crossover of bone line given profound thresholds and reduction in word recognition  Left moderate mixed hearing loss        Right: Speech reception threshold is 115 dB with 32% word recognition at 115 dB. DNT Tympanogram  Left: Speech reception threshold is 45 dB with 88% word recognition at 75 dB. Tympanogram A type     Audiogram was independently reviewed.    Imaging:  CT TEMPORAL BONES WO CONTRAST:  (07/30/2021)                                                      IMPRESSION:  1.  Multiple abnormalities of the right ear including: Small soft tissue  opening at the lateral end of the external auditory canal, lack of an identifiable tympanic membrane and atresia or erosion of the manubrium of the malleus and long delmer of the incus with no definite articulation between the incus and stapes.  2.  Labyrinthine structures on the right are normal.  3.  Normal left temporal bone.    All imaging was independently reviewed.       Assessment and Plan:  Oli Mercedes is a 38 year old woman right congenital aural atresia and microtia.  She has been interested in surgery to open the meatus to avoid recurrent infection and to permit us to examine the canal and TM for signs of cholesteatoma.  She returned today to plan surgery.  However, stated she only wants to do it if the hearing will be the same as the other side.  We would not expect that to be the case as opening the canal will not address the congenital malformation or fixation of the ossicles.  We would need to assess the chain in surgery to see if there is an intervention vs. Bone anchored solution.  She does not wish to have surgery as a result of this discussion as she does not want to have a larger ear opening as it will permit water to enter the canal more easily from her perspective. My recommendation is for her to return in 6 months to 1 year for ear cleaning to avoid build up of debris.  We an always revisit options for hearing and ear reconstruction in the future.      Karla Light MD  Otology & Neurotology  [facility]        Again, thank you for allowing me to participate in the care of your patient.      Sincerely,    Karla Light MD

## 2023-10-24 NOTE — PROGRESS NOTES
Neurotology Clinic      Name: Oli Mercedes  MRN: [id number]  Age: 36 year old  : 1985  Referring provider: Dr. 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 Consultation was originally kindly requested by Dr. Tony Pardo. The patient reports no useable hearing on the right and has not had any history of surgery on this ear, dizziness, or vertigo. The ear has suffered recurrent drainage. We discussed surgery to widen the soft tissue opening and to explore the canal to determine if additional middle ear work was needed.  She was referred to and saw Dr. Morgan Elizalde to discuss additional considerations regarding the auricle for this or future procedures. She cleans the ear daily with a variety of implements, as she has for many years.     She is bothered by itchiness in the canal. She has not been seen in some time as she was pregnant and delivered, and deferred surgery.      Review of Systems:   Pertinent items are noted in HPI or as in patient entered ROS below, remainder of complete ROS is negative.       2022     7:59 AM    ENT ROS   Neurology Headache   Ears, Nose, Throat Hearing loss      Active Medications:     Current Outpatient Medications:     acetaminophen (TYLENOL) 500 MG tablet, Take 1-2 tablets (500-1,000 mg) by mouth every 6 hours as needed for mild pain, Disp: 100 tablet, Rfl: 3    cetirizine (ZYRTEC) 10 MG tablet, Take 1 tablet (10 mg) by mouth daily, Disp: 90 tablet, Rfl: 3    ibuprofen (ADVIL/MOTRIN) 600 MG tablet, Take 1 tablet (600 mg) by mouth every 6 hours as needed for moderate pain (4-6), Disp: 90 tablet, Rfl: 3    melatonin 3 MG tablet, Take 1 tablet (3 mg) by mouth nightly as needed for sleep, Disp: 60 tablet, Rfl: 3    norethindrone (MICRONOR) 0.35 MG tablet, Take 1 tablet (0.35 mg) by mouth daily, Disp: 84 tablet, Rfl: 4    olopatadine  (PATANOL) 0.1 % ophthalmic solution, Place 1 drop into both eyes 2 times daily, Disp: 5 mL, Rfl: 4    omeprazole (PRILOSEC) 40 MG DR capsule, Take 1 capsule (40 mg) by mouth daily as needed (heartburn), Disp: 90 capsule, Rfl: 1    polyethylene glycol (MIRALAX) 17 GM/Dose powder, 1/2 cap to 1 cap daily, or use as directed, Disp: 510 g, Rfl: 0    Prenatal Vit-Fe Fumarate-FA (PRENATAL VITAMIN) 27-0.8 MG TABS, Take 1 tablet by mouth daily, Disp: 90 tablet, Rfl: 4      Allergies:   Food      Past Medical History:  Past Medical History:   Diagnosis Date    Migraines     Urinary tract infection      Patient Active Problem List   Diagnosis    Urticaria    Congenital atresia of right external ear    Mixed hearing loss, bilateral    Microtia of right ear    Language barrier affecting health care    External hemorrhoids    Slow transit constipation    Overactive bladder      Past Surgical History:  No past surgical history on file.    Family History:   Family History   Problem Relation Age of Onset    Hypertension Mother     Diabetes Mother     Hypertension Father     No Known Problems Brother     No Known Problems Sister     No Known Problems Son     No Known Problems Daughter     No Known Problems Daughter          Social History:   Social History     Tobacco Use    Smoking status: Never     Passive exposure: Never    Smokeless tobacco: Former   Vaping Use    Vaping Use: Never used   Substance Use Topics    Alcohol use: Never    Drug use: Never      Physical Exam:   There were no vitals taken for this visit.     Constitutional:  The patient was accompanied by her daughter, well-groomed, and in no acute distress.     Skin: Normal:  warm and pink without rash   Neurologic: Alert and oriented x 3.  CN's III-XII within normal limits.  Voice normal.    Psychiatric: The patient's affect was calm, cooperative, and appropriate.     Communication:  Normal; communicates verbally, normal voice quality.   Respiratory: Breathing  comfortably without stridor or exertion of accessory muscles.    Eyes: Pupils were equal and reactive.  Extraocular movement intact.     Ears: Pinnae and tragus non-tender.  EAC's and TM's were evaluated, results below.        Otologic microscope exam:   Right ear: Microtia, small superior remnant, tag at the level of the lobule, and posterior to that is a small opening. This just admits a #7 suction at the opening. This is dry - no drainage. Beyond the opening, there is an ear canal.  The lining appears healthy today, but I cannot perceive the most medial aspect because it is too narrow, but I removed some moist squamous debris from the far lateral aspect but there was none medially.      Left ear: Grade I microtia. There is a through and through hole in the auricle posterior to the triangular fossa. Ear canal is patent. The TM is intact, but looks like it has either been repaired or remodeled from infections posteriorly.    Audiogram:  AUDIOGRAM: She underwent an audiogram last year.   This demonstrated:   Right profound loss - question crossover of bone line given profound thresholds and reduction in word recognition  Left moderate mixed hearing loss        Right: Speech reception threshold is 115 dB with 32% word recognition at 115 dB. DNT Tympanogram  Left: Speech reception threshold is 45 dB with 88% word recognition at 75 dB. Tympanogram A type     Audiogram was independently reviewed.    Imaging:  CT TEMPORAL BONES WO CONTRAST:  (07/30/2021)                                                      IMPRESSION:  1.  Multiple abnormalities of the right ear including: Small soft tissue opening at the lateral end of the external auditory canal, lack of an identifiable tympanic membrane and atresia or erosion of the manubrium of the malleus and long delmer of the incus with no definite articulation between the incus and stapes.  2.  Labyrinthine structures on the right are normal.  3.  Normal left temporal bone.    All  imaging was independently reviewed.       Assessment and Plan:  Oli Mercedes is a 38 year old woman right congenital aural atresia and microtia.  She has been interested in surgery to open the meatus to avoid recurrent infection and to permit us to examine the canal and TM for signs of cholesteatoma.  She returned today to plan surgery.  However, stated she only wants to do it if the hearing will be the same as the other side.  We would not expect that to be the case as opening the canal will not address the congenital malformation or fixation of the ossicles.  We would need to assess the chain in surgery to see if there is an intervention vs. Bone anchored solution.  She does not wish to have surgery as a result of this discussion as she does not want to have a larger ear opening as it will permit water to enter the canal more easily from her perspective. My recommendation is for her to return in 6 months to 1 year for ear cleaning to avoid build up of debris.  We an always revisit options for hearing and ear reconstruction in the future.      Karla Light MD  Otology & Neurotology  [facility]

## 2023-10-24 NOTE — PATIENT INSTRUCTIONS
You were seen in the ENT Clinic today by Dr. Light. If you have any questions or concerns after your appointment, please contact us (see below)      2.   Please return to clinic in one year with an audiogram prior.         How to Contact Us:  Send a [employer] message to your provider. Our team will respond to you via [employer]. Occasionally, we will need to call you to get further information.  For urgent matters (Monday-Friday), call the ENT Clinic: [phone] and speak with a call center team member - they will route your call appropriately.   If you'd like to speak directly with a nurse, please find our contact information below. We do our best to check voicemail frequently throughout the day, and will work to call you back within 1-2 days. For urgent matters, please use the general clinic phone numbers listed above.      Chloe PERRY RN  ENT RN Care Coordinator  Direct: [phone]    Love JARVIS LPN  Direct: [phone]

## 2023-10-25 DIAGNOSIS — K52.9 FREQUENT STOOLS: ICD-10-CM

## 2023-10-25 DIAGNOSIS — R19.5 HARD STOOL: ICD-10-CM

## 2023-10-25 RX ORDER — POLYETHYLENE GLYCOL 3350 17 G/17G
POWDER, FOR SOLUTION ORAL
Qty: 510 G | Refills: 0 | Status: CANCELLED | OUTPATIENT
Start: 2023-10-25

## 2023-10-26 ENCOUNTER — OFFICE VISIT (OUTPATIENT)
Dept: GASTROENTEROLOGY | Facility: CLINIC | Age: 38
End: 2023-10-26
Attending: PHYSICIAN ASSISTANT
Payer: COMMERCIAL

## 2023-10-26 ENCOUNTER — LAB (OUTPATIENT)
Dept: LAB | Facility: CLINIC | Age: 38
End: 2023-10-26
Payer: COMMERCIAL

## 2023-10-26 VITALS
DIASTOLIC BLOOD PRESSURE: 90 MMHG | OXYGEN SATURATION: 100 % | HEART RATE: 85 BPM | WEIGHT: 129 LBS | HEIGHT: 59 IN | SYSTOLIC BLOOD PRESSURE: 121 MMHG | BODY MASS INDEX: 26 KG/M2

## 2023-10-26 DIAGNOSIS — R35.0 URINARY FREQUENCY: ICD-10-CM

## 2023-10-26 DIAGNOSIS — K52.9 FREQUENT STOOLS: ICD-10-CM

## 2023-10-26 DIAGNOSIS — R14.0 BLOATING: ICD-10-CM

## 2023-10-26 DIAGNOSIS — R10.2 PELVIC PAIN IN FEMALE: Primary | ICD-10-CM

## 2023-10-26 DIAGNOSIS — R19.5 HARD STOOL: ICD-10-CM

## 2023-10-26 LAB
ALBUMIN UR-MCNC: NEGATIVE MG/DL
APPEARANCE UR: CLEAR
BACTERIA #/AREA URNS HPF: ABNORMAL /HPF
BILIRUB UR QL STRIP: NEGATIVE
COLOR UR AUTO: ABNORMAL
GLUCOSE UR STRIP-MCNC: NEGATIVE MG/DL
HGB UR QL STRIP: NEGATIVE
KETONES UR STRIP-MCNC: NEGATIVE MG/DL
LEUKOCYTE ESTERASE UR QL STRIP: ABNORMAL
MUCOUS THREADS #/AREA URNS LPF: PRESENT /LPF
NITRATE UR QL: NEGATIVE
PH UR STRIP: 6.5 [PH] (ref 5–7)
RBC URINE: 4 /HPF
SP GR UR STRIP: 1.01 (ref 1–1.03)
SQUAMOUS EPITHELIAL: 3 /HPF
TSH SERPL DL<=0.005 MIU/L-ACNC: 1.15 UIU/ML (ref 0.3–4.2)
UROBILINOGEN UR STRIP-MCNC: NORMAL MG/DL
WBC URINE: 8 /HPF

## 2023-10-26 PROCEDURE — 84443 ASSAY THYROID STIM HORMONE: CPT | Performed by: PATHOLOGY

## 2023-10-26 PROCEDURE — 99215 OFFICE O/P EST HI 40 MIN: CPT | Performed by: PHYSICIAN ASSISTANT

## 2023-10-26 PROCEDURE — 99000 SPECIMEN HANDLING OFFICE-LAB: CPT | Performed by: PATHOLOGY

## 2023-10-26 PROCEDURE — 81001 URINALYSIS AUTO W/SCOPE: CPT | Performed by: PATHOLOGY

## 2023-10-26 PROCEDURE — 36415 COLL VENOUS BLD VENIPUNCTURE: CPT | Performed by: PATHOLOGY

## 2023-10-26 PROCEDURE — 82784 ASSAY IGA/IGD/IGG/IGM EACH: CPT | Performed by: PHYSICIAN ASSISTANT

## 2023-10-26 PROCEDURE — 86364 TISS TRNSGLTMNASE EA IG CLAS: CPT | Performed by: PHYSICIAN ASSISTANT

## 2023-10-26 RX ORDER — POLYETHYLENE GLYCOL 3350 17 G/17G
POWDER, FOR SOLUTION ORAL
Qty: 510 G | Refills: 3 | Status: SHIPPED | OUTPATIENT
Start: 2023-10-26 | End: 2024-01-25

## 2023-10-26 ASSESSMENT — PAIN SCALES - GENERAL: PAINLEVEL: NO PAIN (0)

## 2023-10-26 NOTE — LETTER
10/26/2023         RE: Say Daren  [address] [place] [address]        Dear Colleague,    Thank you for referring your patient, Say Daren, to the [facility] [place]. Please see a copy of my visit note below.      GI CLINIC VISIT    Say Daren is a 38 year old year old female with PMHx of overactive bladder, hemorrhoids, slowed transit constipation following with the [facility] [place] [facility] group for frequent stooling    Today 10/26/23 --     Seen with me initially 9/21 where suspected to have under-treated constipated.  Labs (TTG Ab, TSH) and fecal calpr, KUB ordered but not done. Started on scheduled miralax.     Here today for 1 mo follow-up    Not able to perform med rec with rooming team.   Started miralax 2 caps at night. Feels it helps her stooling. 1 in AM and 1 in PM, both BSC type 3. Good evacuation. No blood or black. She would like a refill.   Last visit, she had BSC type 5-7 w/o use of miralax.   Continues with lower abdominal discomfort.' Pelvic region cramping with radiation to entire lower back, typically notices it at later in evening. Not daily sensation. Massages area which improves it. Wants it checked out. Started when she was pregnant with son who is currently 10 mo old. Persisted after delivery. Unclear triggering or aggravating factors despite directed questioning.   Heartburn managed with omeprazole. Will aggravate with eating spicy foods.   No F/C/N/V/ Dysphagia/odynphagia.   Appetite stable. Weight stable.   Last menstrual cycle end of Sept 2023. She denies being pregnant.   Known right congenital aural atresia and microtia recently seen with ENT. She wants to know if I can send for drops or abx because it is itchy deep inside. Ongoing since childhood.  Per notes, she declined surgery to open meatus.   No other concerns or questions    Wt Readings from Last 10 Encounters:   10/26/23 58.5 kg (129 lb)   09/21/23 58.5 kg (129 lb)   08/21/23 57.9 kg (127 lb 12 oz)  "  08/07/23 58 kg (127 lb 12.8 oz)   04/06/23 59 kg (130 lb)   03/01/23 57.3 kg (126 lb 4 oz)   02/06/23 56.8 kg (125 lb 4 oz)   12/26/22 61.9 kg (136 lb 8 oz)   12/21/22 61.7 kg (136 lb)   12/14/22 61.5 kg (135 lb 8 oz)     HPI 9/21/23    1. Shares from 6a-10a, she has a BM once every hour. She is on toilet for 10-15 min trying to pass a BM. She denies straining. 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 She denies loose or soft stools (I described and pointed at BSC type 5-7). She tells me this stooling pattern has been present for a long time. Stools are brown in coloration.   She also does have lower abdominal \"discomfort\" that has been present for at least 1-2 years, it come and goes. I am not sure if it is present now (she did not answer the question) but when it is present, she describes it as mild.   She does not know of any triggers or alleviating factors for this pain. When directly asked, she denies any relationship of this pain with eating or having a BM - however despite professional  services,questions had to be asked multiple times in order to get a response; and at times her response did not address or answer the question. Essentially I am not certain if she fully understood the questioning.   She has 4 children, all vaginal births. Unclear if she has had any complications such as tears. She is breast feeding currently.     2. She wants to have her urine checked. Tells me she is going frequently and has had UTI. She denies fever, chills. Appetite is stable.   Tells me she has pressure to lower abdomen.     3. She is taking omeprazole. Shares it manages her reflux. She denies dysphagia or odynphagia. She denies upper abd pain, particularly to epigastric region.     Wt Readings from Last 10 Encounters:   09/21/23 58.5 kg (129 lb)   08/21/23 57.9 kg (127 lb 12 oz)   08/07/23 58 kg (127 lb 12.8 oz)   04/06/23 59 kg (130 lb)   03/01/23 57.3 kg (126 lb 4 " oz)   02/06/23 56.8 kg (125 lb 4 oz)   12/26/22 61.9 kg (136 lb 8 oz)   12/21/22 61.7 kg (136 lb)   12/14/22 61.5 kg (135 lb 8 oz)   12/07/22 61.2 kg (135 lb)     ROS  Per HPI     Family Hx  No other known family history or GI related malignancy (esophageal, gastric, pancreatic, liver or colon) or family history of IBD/celiac disease.     Social Hx   Lactating   ? use of NSAIDs or Tylenol. She denied use when I asked but per PCP note, she takes ibuprofen use   ? use of OTC herbal supplements/weight loss products.        PROBLEM LIST  Patient Active Problem List    Diagnosis Date Noted    Overactive bladder 04/06/2023     Priority: Medium    External hemorrhoids 10/26/2022     Priority: Medium    Slow transit constipation 10/26/2022     Priority: Medium    Language barrier affecting health care 12/13/2021     Priority: Medium    Microtia of right ear 11/18/2021     Priority: Medium    Congenital atresia of right external ear 11/11/2021     Priority: Medium    Mixed hearing loss, bilateral 11/11/2021     Priority: Medium    Urticaria 08/12/2021     Priority: Medium       PERTINENT PAST MEDICAL HISTORY:  Past Medical History:   Diagnosis Date    Migraines     Urinary tract infection        PREVIOUS SURGERIES:  No past surgical history on file.    ALLERGIES:     Allergies   Allergen Reactions    Food      Eggplants--makes her skin itchy       PERTINENT MEDICATIONS:    Current Outpatient Medications:     acetaminophen (TYLENOL) 500 MG tablet, Take 1-2 tablets (500-1,000 mg) by mouth every 6 hours as needed for mild pain, Disp: 100 tablet, Rfl: 3    cetirizine (ZYRTEC) 10 MG tablet, Take 1 tablet (10 mg) by mouth daily, Disp: 90 tablet, Rfl: 3    ibuprofen (ADVIL/MOTRIN) 600 MG tablet, Take 1 tablet (600 mg) by mouth every 6 hours as needed for moderate pain (4-6), Disp: 90 tablet, Rfl: 3    melatonin 3 MG tablet, Take 1 tablet (3 mg) by mouth nightly as needed for sleep, Disp: 60 tablet, Rfl: 3    norethindrone  (MICRONOR) 0.35 MG tablet, Take 1 tablet (0.35 mg) by mouth daily, Disp: 84 tablet, Rfl: 4    olopatadine (PATANOL) 0.1 % ophthalmic solution, Place 1 drop into both eyes 2 times daily, Disp: 5 mL, Rfl: 4    omeprazole (PRILOSEC) 40 MG DR capsule, Take 1 capsule (40 mg) by mouth daily as needed (heartburn), Disp: 90 capsule, Rfl: 1    polyethylene glycol (MIRALAX) 17 GM/Dose powder, 1/2 cap to 1 cap daily, or use as directed, Disp: 510 g, Rfl: 0    Prenatal Vit-Fe Fumarate-FA (PRENATAL VITAMIN) 27-0.8 MG TABS, Take 1 tablet by mouth daily, Disp: 90 tablet, Rfl: 4    SOCIAL HISTORY:  Social History     Socioeconomic History    Marital status:      Spouse name: Not on file    Number of children: 3    Years of education: 0    Highest education level: Never attended school   Occupational History    Not on file   Tobacco Use    Smoking status: Never     Passive exposure: Never    Smokeless tobacco: Former   Vaping Use    Vaping Use: Never used   Substance and Sexual Activity    Alcohol use: Never    Drug use: Never    Sexual activity: Yes     Partners: Male   Other Topics Concern    Not on file   Social History Narrative    Her children are ages 9, 8, and 3 (as of 2020).      Social Determinants of Health     Financial Resource Strain: Low Risk  (10/20/2021)    Overall Financial Resource Strain (CARDIA)     Difficulty of Paying Living Expenses: Not very hard   Food Insecurity: No Food Insecurity (10/20/2021)    Hunger Vital Sign     Worried About Running Out of Food in the Last Year: Never true     Ran Out of Food in the Last Year: Never true   Transportation Needs: Unmet Transportation Needs (10/20/2021)    PRAPARE - Transportation     Lack of Transportation (Medical): Yes     Lack of Transportation (Non-Medical): Yes   Physical Activity: Insufficiently Active (10/20/2021)    Exercise Vital Sign     Days of Exercise per Week: 3 days     Minutes of Exercise per Session: 10 min   Stress: Stress Concern Present  (1/18/2023)    [employer] [place] - Occupational Stress Questionnaire     Feeling of Stress : To some extent   Social Connections: Socially Integrated (1/18/2023)    Social Connection and Isolation Panel [NHANES]     Frequency of Communication with Friends and Family: Twice a week     Frequency of Social Gatherings with Friends and Family: Twice a week     Attends Spiritism Services: 1 to 4 times per year     Active Member of Clubs or Organizations: No     Attends Club or Organization Meetings: 1 to 4 times per year     Marital Status:    Interpersonal Safety: Not At Risk (10/20/2021)    Humiliation, Afraid, Rape, and Kick questionnaire     Fear of Current or Ex-Partner: No     Emotionally Abused: No     Physically Abused: No     Sexually Abused: No   Housing Stability: Low Risk  (10/20/2021)    Housing Stability Vital Sign     Unable to Pay for Housing in the Last Year: No     Number of Places Lived in the Last Year: 1     Unstable Housing in the Last Year: No       FAMILY HISTORY:  Family History   Problem Relation Age of Onset    Hypertension Mother     Diabetes Mother     Hypertension Father     No Known Problems Brother     No Known Problems Sister     No Known Problems Son     No Known Problems Daughter     No Known Problems Daughter        Past/family/social history reviewed and no changes    PHYSICAL EXAMINATION:  Vitals reviewed: There were no vitals taken for this visit.  Wt:   Wt Readings from Last 2 Encounters:   09/21/23 58.5 kg (129 lb)   08/21/23 57.9 kg (127 lb 12 oz)        Constitutional: aaox3, cooperative, pleasant, not dyspneic/diaphoretic, no acute distress. Appears younger than stated aged.   Eyes: Sclera anicteric/injected  Ears/nose/mouth/throat: . R external ear aural atresia and microtia.  Unable to perform inspection of EAC.  No obvious rash or signs of skin irritation visualized.  No tenderness with external manipulation of external ear structures.   Neck:  supple, active ROM w/o limitation or pain   CV: No edema  Respiratory: Unlabored breathing  Abd:  Nondistended, +bs, no hepatosplenomegaly, nontender to light and deep palpation, no peritoneal signs, neg De Los Santos's sign.  Some tenderness elicited with palpation of suprapubic region . NEG CVA tenderness b/l   Skin: warm, perfused, no jaundice  Psych: Normal affect  MSK: Normal gait     PERTINENT STUDIES:    Office Visit on 08/21/2023   Component Date Value Ref Range Status    Sodium 08/21/2023 137  136 - 145 mmol/L Final    Potassium 08/21/2023 3.9  3.4 - 5.3 mmol/L Final    Chloride 08/21/2023 105  98 - 107 mmol/L Final    Carbon Dioxide (CO2) 08/21/2023 21 (L)  22 - 29 mmol/L Final    Anion Gap 08/21/2023 11  7 - 15 mmol/L Final    Urea Nitrogen 08/21/2023 8.7  6.0 - 20.0 mg/dL Final    Creatinine 08/21/2023 0.53  0.51 - 0.95 mg/dL Final    Calcium 08/21/2023 9.0  8.6 - 10.0 mg/dL Final    Glucose 08/21/2023 124 (H)  70 - 99 mg/dL Final    Alkaline Phosphatase 08/21/2023 83  35 - 104 U/L Final    AST 08/21/2023 30  0 - 45 U/L Final    ALT 08/21/2023 37  0 - 50 U/L Final    Protein Total 08/21/2023 7.8  6.4 - 8.3 g/dL Final    Albumin 08/21/2023 4.5  3.5 - 5.2 g/dL Final    Bilirubin Total 08/21/2023 0.3  <=1.2 mg/dL Final    GFR Estimate 08/21/2023 >90  >60 mL/min/1.73m2 Final    Vitamin B12 08/21/2023 446  232 - 1,245 pg/mL Final    WBC Count 08/21/2023 8.5  4.0 - 11.0 10e3/uL Final    RBC Count 08/21/2023 4.92  3.80 - 5.20 10e6/uL Final    Hemoglobin 08/21/2023 14.3  11.7 - 15.7 g/dL Final    Hematocrit 08/21/2023 43.5  35.0 - 47.0 % Final    MCV 08/21/2023 88  78 - 100 fL Final    MCH 08/21/2023 29.1  26.5 - 33.0 pg Final    MCHC 08/21/2023 32.9  31.5 - 36.5 g/dL Final    RDW 08/21/2023 12.7  10.0 - 15.0 % Final    Platelet Count 08/21/2023 318  150 - 450 10e3/uL Final    % Neutrophils 08/21/2023 55  % Final    % Lymphocytes 08/21/2023 32  % Final    % Monocytes 08/21/2023 10  % Final    % Eosinophils 08/21/2023 3   % Final    % Basophils 08/21/2023 1  % Final    % Immature Granulocytes 08/21/2023 0  % Final    Absolute Neutrophils 08/21/2023 4.7  1.6 - 8.3 10e3/uL Final    Absolute Lymphocytes 08/21/2023 2.8  0.8 - 5.3 10e3/uL Final    Absolute Monocytes 08/21/2023 0.8  0.0 - 1.3 10e3/uL Final    Absolute Eosinophils 08/21/2023 0.2  0.0 - 0.7 10e3/uL Final    Absolute Basophils 08/21/2023 0.0  0.0 - 0.2 10e3/uL Final    Absolute Immature Granulocytes 08/21/2023 0.0  <=0.4 10e3/uL Final    CRP Inflammation 08/21/2023 <3.00  <5.00 mg/L Final        Lab Results   Component Value Date    WBC 8.5 08/21/2023    WBC 9.3 07/06/2022    WBC 7.1 06/23/2021    HGB 14.3 08/21/2023    HGB 12.1 01/01/2023    HGB 11.8 09/28/2022     08/21/2023     07/06/2022     06/23/2021    CHOL 206 (H) 06/23/2021    TRIG 224 (H) 06/23/2021    HDL 41 (L) 06/23/2021    ALT 37 08/21/2023    ALT 36 06/23/2021    AST 30 08/21/2023    AST 25 06/23/2021     08/21/2023     06/23/2021     03/06/2020    BUN 8.7 08/21/2023    BUN 7 (L) 06/23/2021    BUN 9 03/06/2020    CO2 21 (L) 08/21/2023    CO2 22 06/23/2021    CO2 27 03/06/2020    TSH 1.84 06/23/2021        Liver Function Studies -   Recent Labs   Lab Test 08/21/23  1116   PROTTOTAL 7.8   ALBUMIN 4.5   BILITOTAL 0.3   ALKPHOS 83   AST 30   ALT 37      Narrative & Impression   EXAM: US OB < 14 WEEKS SINGLE-TRANSABDOMINAL  LOCATION: [facility]  DATE/TIME: 7/8/2022 8:14 AM     INDICATION: Antepartum multigravida of advanced maternal age.  COMPARISON: None.  TECHNIQUE: Transabdominal scans were performed. Endovaginal ultrasound was performed to better visualize the embryo.     FINDINGS:  UTERUS: Single normal appearing intrauterine gestation sac. There is a 1.7 cm intramural fibroid involving the anterior aspect of uterus.     BIOMETRY:  Biparietal Diameter: 2.4 cm, 14 weeks, 2 days.  Head Circumference: 9.4 cm, 14 weeks, 2 days.  Abdominal  Circumference: 7.1 cm, 13 weeks, 5 days.  Femur Length: 1.3 cm, 13 weeks, 6 days.     Estimated Fetal Weight: 83 g.  EFW Percentile: 72%.     Cervical Length: 3.4 cm     Composite Age by This US: 14 weeks, 0 days.  EDC by This US exam: 1/6/2023.     FETAL HEART RATE: 156 bpm.  AMNIOTIC FLUID: Normal.  PLACENTA: Anteriorly. The inferior placental margin is 1.6 cm from the internal cervical os.     RIGHT OVARY: Normal.  LEFT OVARY: Normal.                                                                      IMPRESSION:   1.  Single living intrauterine gestation at 14 weeks 0 days, EDC 1/6/2023.  2.  Anterior low-lying placenta with the inferior placental margin 1.6 cm from the internal cervical os. This will likely resolve as the pregnancy progresses.  3.  1.7 cm anterior uterine fibroid.          PREVIOUS ENDOSCOPY    No results found for this or any previous visit.    ASSESSMENT/PLAN:  Oli Mercedes is a 38 year old year old female with PMHx significant for overactive bladder, hemorrhoids, slowed transit constipation following with general GI for frequent stools.   Historically she describes 4 episodes of BSC type 3 associated with lower abdominal pain daily.  She has 4 children, all vaginal deliveries and is currently lactating. Labs include normal CBC, CMP (glucose 124), and normal CRP drawn in last month.  She did not complete the diagnostics ordered at our last visit.     #freq stools  #BSC type 3  Presenting symptoms are most consistent with under-treated constipation however the differential includes IBS-C (especially with abdominal pain component), pelvic floor dysfunction, and hypothyroidism vs other.  Consider inflammatory diarrhea.   She overall reports improved stooling pattern with the start of daily MiraLAX (2 caps at night)  -Plan from the last visit stands  -KUB for eval of stool burden   -Rx sent for MiraLAX which she is taking daily  -check TSH, celiac with IgA (though less likely to be the  cause)  -Pending fecal calprotectin  -Recommended squatty potty    Future consideration  Scheduled fiber  Due to language barrier, consultation and evaluation with pelvic floor center will be difficult   Consider in person  for future visits    #Reflux  Controlled on daily omperazole 40 mg without breakthrough or alarm sx.  Dose increased to 40 on 8/21, per notes.     -cont follow up with primary.      Future consideration  Consider down-titration of PPI after 3 months use, ~10/21/23 with close clinical follow-up. Can be done with GI or PCP.   If she gets off PPI, consider h. Pylori stool antigen; though she denies epigastric pain or dyspeptic symptoms today     #frequent urination   In setting of overactive bladder in problem list.   Exam benign, neg CVA tenderness, hemodynamically stable  -check UA, per patient request    Future consideration  1.  Referral to urology    #Pelvic pain  Unclear etiology.  Started after delivery 10 months prior.  We reviewed ultrasound done 8/2022 with normal ovaries without abnormalities.   -Check UA  - I asked her to follow-up with primary for further evaluation of this continued pain    #R ear itching  Given congenital changes, I defer management to ENT.  She is okay with this plan.   - I asked that she use a hair dryer to the afflicted ear after showering to help dry any moisture that is in the area  -She is okay to for me to send staff message to the ENT team for further    No orders of the defined types were placed in this encounter.      Colorectal cancer screening: aged 44    RTC 2-3 mo for short term follow-up.    Thank you for this consultation.  It was a pleasure to participate in the care of this patient; please contact me with any further questions.       Follow up: As planned above. Today, I personally spent 40 minutes in direct face to face time with the patient, of which greater than 50% of the time was spent in patient education and counseling as described  above. Approximately minutes were spent on indirect care associated with the patient's consultation including but not limited to review of: patient medical records to date, clinic visits, hospital records, lab results, imaging studies, procedural documentation, and coordinating care with other providers. The findings from this review are summarized in the above note. All of the above accounted for a cumulative time of 40 minutes and was performed on the date of service.         Again, thank you for allowing me to participate in the care of your patient.      Sincerely,    Kelin Hopper PA-C

## 2023-10-26 NOTE — PROGRESS NOTES
GI CLINIC VISIT    Oli Mercedes is a 38 year old year old female with PMHx of overactive bladder, hemorrhoids, slowed transit constipation following with the [facility] GI group for frequent stooling    Today 10/26/23 --     Seen with me initially 9/21 where suspected to have under-treated constipated.  Labs (TTG Ab, TSH) and fecal calpr, KUB ordered but not done. Started on scheduled miralax.     Here today for 1 mo follow-up    1. Not able to perform med rec with rooming team.   2. Started miralax 2 caps at night. Feels it helps her stooling. 1 in AM and 1 in PM, both BSC type 3. Good evacuation. No blood or black. She would like a refill.   Last visit, she had BSC type 5-7 w/o use of miralax.   3. Continues with lower abdominal discomfort.' Pelvic region cramping with radiation to entire lower back, typically notices it at later in evening. Not daily sensation. Massages area which improves it. Wants it checked out. Started when she was pregnant with son who is currently 10 mo old. Persisted after delivery. Unclear triggering or aggravating factors despite directed questioning.   4. Heartburn managed with omeprazole. Will aggravate with eating spicy foods.   No F/C/N/V/ Dysphagia/odynphagia.   Appetite stable. Weight stable.   5. Last menstrual cycle end of Sept 2023. She denies being pregnant.   6. Known right congenital aural atresia and microtia recently seen with ENT. She wants to know if I can send for drops or abx because it is itchy deep inside. Ongoing since childhood.  Per notes, she declined surgery to open meatus.   7. No other concerns or questions    8. Wt Readings from Last 10 Encounters:   9. 10/26/23 10. 58.5 kg (129 lb)   11. 09/21/23 12. 58.5 kg (129 lb)   13. 08/21/23 14. 57.9 kg (127 lb 12 oz)   15. 08/07/23 16. 58 kg (127 lb 12.8 oz)   17. 04/06/23 18. 59 kg (130 lb)   19. 03/01/23 20. 57.3 kg (126 lb 4 oz)   21. 02/06/23 22. 56.8 kg (125 lb 4 oz)   23. 12/26/22 24. 61.9 kg (136 lb 8 oz)   25. 12/21/22  "26. 61.7 kg (136 lb)   27. 12/14/22 28. 61.5 kg (135 lb 8 oz)     HPI 9/21/23    1. Shares from 6a-10a, she has a BM once every hour. She is on toilet for 10-15 min trying to pass a BM. She denies straining. 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 She denies loose or soft stools (I described and pointed at BSC type 5-7). She tells me this stooling pattern has been present for a long time. Stools are brown in coloration.   She also does have lower abdominal \"discomfort\" that has been present for at least 1-2 years, it come and goes. I am not sure if it is present now (she did not answer the question) but when it is present, she describes it as mild.   She does not know of any triggers or alleviating factors for this pain. When directly asked, she denies any relationship of this pain with eating or having a BM - however despite professional  services,questions had to be asked multiple times in order to get a response; and at times her response did not address or answer the question. Essentially I am not certain if she fully understood the questioning.   She has 4 children, all vaginal births. Unclear if she has had any complications such as tears. She is breast feeding currently.     2. She wants to have her urine checked. Tells me she is going frequently and has had UTI. She denies fever, chills. Appetite is stable.   Tells me she has pressure to lower abdomen.     3. She is taking omeprazole. Shares it manages her reflux. She denies dysphagia or odynphagia. She denies upper abd pain, particularly to epigastric region.     Wt Readings from Last 10 Encounters:   09/21/23 58.5 kg (129 lb)   08/21/23 57.9 kg (127 lb 12 oz)   08/07/23 58 kg (127 lb 12.8 oz)   04/06/23 59 kg (130 lb)   03/01/23 57.3 kg (126 lb 4 oz)   02/06/23 56.8 kg (125 lb 4 oz)   12/26/22 61.9 kg (136 lb 8 oz)   12/21/22 61.7 kg (136 lb)   12/14/22 61.5 kg (135 lb 8 oz)   12/07/22 61.2 kg (135 lb) "     ROS  Per HPI     Family Hx  No other known family history or GI related malignancy (esophageal, gastric, pancreatic, liver or colon) or family history of IBD/celiac disease.     Social Hx   Lactating   ? use of NSAIDs or Tylenol. She denied use when I asked but per PCP note, she takes ibuprofen use   ? use of OTC herbal supplements/weight loss products.        PROBLEM LIST  Patient Active Problem List    Diagnosis Date Noted     Overactive bladder 04/06/2023     Priority: Medium     External hemorrhoids 10/26/2022     Priority: Medium     Slow transit constipation 10/26/2022     Priority: Medium     Language barrier affecting health care 12/13/2021     Priority: Medium     Microtia of right ear 11/18/2021     Priority: Medium     Congenital atresia of right external ear 11/11/2021     Priority: Medium     Mixed hearing loss, bilateral 11/11/2021     Priority: Medium     Urticaria 08/12/2021     Priority: Medium       PERTINENT PAST MEDICAL HISTORY:  Past Medical History:   Diagnosis Date     Migraines      Urinary tract infection        PREVIOUS SURGERIES:  No past surgical history on file.    ALLERGIES:     Allergies   Allergen Reactions     Food      Eggplants--makes her skin itchy       PERTINENT MEDICATIONS:    Current Outpatient Medications:      acetaminophen (TYLENOL) 500 MG tablet, Take 1-2 tablets (500-1,000 mg) by mouth every 6 hours as needed for mild pain, Disp: 100 tablet, Rfl: 3     cetirizine (ZYRTEC) 10 MG tablet, Take 1 tablet (10 mg) by mouth daily, Disp: 90 tablet, Rfl: 3     ibuprofen (ADVIL/MOTRIN) 600 MG tablet, Take 1 tablet (600 mg) by mouth every 6 hours as needed for moderate pain (4-6), Disp: 90 tablet, Rfl: 3     melatonin 3 MG tablet, Take 1 tablet (3 mg) by mouth nightly as needed for sleep, Disp: 60 tablet, Rfl: 3     norethindrone (MICRONOR) 0.35 MG tablet, Take 1 tablet (0.35 mg) by mouth daily, Disp: 84 tablet, Rfl: 4     olopatadine (PATANOL) 0.1 % ophthalmic solution, Place 1  drop into both eyes 2 times daily, Disp: 5 mL, Rfl: 4     omeprazole (PRILOSEC) 40 MG DR capsule, Take 1 capsule (40 mg) by mouth daily as needed (heartburn), Disp: 90 capsule, Rfl: 1     polyethylene glycol (MIRALAX) 17 GM/Dose powder, 1/2 cap to 1 cap daily, or use as directed, Disp: 510 g, Rfl: 0     Prenatal Vit-Fe Fumarate-FA (PRENATAL VITAMIN) 27-0.8 MG TABS, Take 1 tablet by mouth daily, Disp: 90 tablet, Rfl: 4    SOCIAL HISTORY:  Social History     Socioeconomic History     Marital status:      Spouse name: Not on file     Number of children: 3     Years of education: 0     Highest education level: Never attended school   Occupational History     Not on file   Tobacco Use     Smoking status: Never     Passive exposure: Never     Smokeless tobacco: Former   Vaping Use     Vaping Use: Never used   Substance and Sexual Activity     Alcohol use: Never     Drug use: Never     Sexual activity: Yes     Partners: Male   Other Topics Concern     Not on file   Social History Narrative    Her children are ages 9, 8, and 3 (as of 2020).      Social Determinants of Health     Financial Resource Strain: Low Risk  (10/20/2021)    Overall Financial Resource Strain (CARDIA)      Difficulty of Paying Living Expenses: Not very hard   Food Insecurity: No Food Insecurity (10/20/2021)    Hunger Vital Sign      Worried About Running Out of Food in the Last Year: Never true      Ran Out of Food in the Last Year: Never true   Transportation Needs: Unmet Transportation Needs (10/20/2021)    PRAPARE - Transportation      Lack of Transportation (Medical): Yes      Lack of Transportation (Non-Medical): Yes   Physical Activity: Insufficiently Active (10/20/2021)    Exercise Vital Sign      Days of Exercise per Week: 3 days      Minutes of Exercise per Session: 10 min   Stress: Stress Concern Present (1/18/2023)    [employer] [employer] - Occupational Stress Questionnaire      Feeling of Stress : To some extent    Social Connections: Socially Integrated (1/18/2023)    Social Connection and Isolation Panel [NHANES]      Frequency of Communication with Friends and Family: Twice a week      Frequency of Social Gatherings with Friends and Family: Twice a week      Attends Je[employer] witness Services: 1 to 4 times per year      Active Member of Clubs or Organizations: No      Attends Club or Organization Meetings: 1 to 4 times per year      Marital Status:    Interpersonal Safety: Not At Risk (10/20/2021)    Humiliation, Afraid, Rape, and Kick questionnaire      Fear of Current or Ex-Partner: No      Emotionally Abused: No      Physically Abused: No      Sexually Abused: No   Housing Stability: Low Risk  (10/20/2021)    Housing Stability Vital Sign      Unable to Pay for Housing in the Last Year: No      Number of Places Lived in the Last Year: 1      Unstable Housing in the Last Year: No       FAMILY HISTORY:  Family History   Problem Relation Age of Onset     Hypertension Mother      Diabetes Mother      Hypertension Father      No Known Problems Brother      No Known Problems Sister      No Known Problems Son      No Known Problems Daughter      No Known Problems Daughter        Past/family/social history reviewed and no changes    PHYSICAL EXAMINATION:  Vitals reviewed: There were no vitals taken for this visit.  Wt:   Wt Readings from Last 2 Encounters:   09/21/23 58.5 kg (129 lb)   08/21/23 57.9 kg (127 lb 12 oz)        Constitutional: aaox3, cooperative, pleasant, not dyspneic/diaphoretic, no acute distress. Appears younger than stated aged.   Eyes: Sclera anicteric/injected  Ears/nose/mouth/throat: . R external ear aural atresia and microtia.  Unable to perform inspection of EAC.  No obvious rash or signs of skin irritation visualized.  No tenderness with external manipulation of external ear structures.   Neck: supple, active ROM w/o limitation or pain   CV: No edema  Respiratory: Unlabored breathing  Abd:  Nondistended,  +bs, no hepatosplenomegaly, nontender to light and deep palpation, no peritoneal signs, neg De Los Santos's sign.  Some tenderness elicited with palpation of suprapubic region . NEG CVA tenderness b/l   Skin: warm, perfused, no jaundice  Psych: Normal affect  MSK: Normal gait     PERTINENT STUDIES:    Office Visit on 08/21/2023   Component Date Value Ref Range Status     Sodium 08/21/2023 137  136 - 145 mmol/L Final     Potassium 08/21/2023 3.9  3.4 - 5.3 mmol/L Final     Chloride 08/21/2023 105  98 - 107 mmol/L Final     Carbon Dioxide (CO2) 08/21/2023 21 (L)  22 - 29 mmol/L Final     Anion Gap 08/21/2023 11  7 - 15 mmol/L Final     Urea Nitrogen 08/21/2023 8.7  6.0 - 20.0 mg/dL Final     Creatinine 08/21/2023 0.53  0.51 - 0.95 mg/dL Final     Calcium 08/21/2023 9.0  8.6 - 10.0 mg/dL Final     Glucose 08/21/2023 124 (H)  70 - 99 mg/dL Final     Alkaline Phosphatase 08/21/2023 83  35 - 104 U/L Final     AST 08/21/2023 30  0 - 45 U/L Final     ALT 08/21/2023 37  0 - 50 U/L Final     Protein Total 08/21/2023 7.8  6.4 - 8.3 g/dL Final     Albumin 08/21/2023 4.5  3.5 - 5.2 g/dL Final     Bilirubin Total 08/21/2023 0.3  <=1.2 mg/dL Final     GFR Estimate 08/21/2023 >90  >60 mL/min/1.73m2 Final     Vitamin B12 08/21/2023 446  232 - 1,245 pg/mL Final     WBC Count 08/21/2023 8.5  4.0 - 11.0 10e3/uL Final     RBC Count 08/21/2023 4.92  3.80 - 5.20 10e6/uL Final     Hemoglobin 08/21/2023 14.3  11.7 - 15.7 g/dL Final     Hematocrit 08/21/2023 43.5  35.0 - 47.0 % Final     MCV 08/21/2023 88  78 - 100 fL Final     MCH 08/21/2023 29.1  26.5 - 33.0 pg Final     MCHC 08/21/2023 32.9  31.5 - 36.5 g/dL Final     RDW 08/21/2023 12.7  10.0 - 15.0 % Final     Platelet Count 08/21/2023 318  150 - 450 10e3/uL Final     % Neutrophils 08/21/2023 55  % Final     % Lymphocytes 08/21/2023 32  % Final     % Monocytes 08/21/2023 10  % Final     % Eosinophils 08/21/2023 3  % Final     % Basophils 08/21/2023 1  % Final     % Immature Granulocytes  08/21/2023 0  % Final     Absolute Neutrophils 08/21/2023 4.7  1.6 - 8.3 10e3/uL Final     Absolute Lymphocytes 08/21/2023 2.8  0.8 - 5.3 10e3/uL Final     Absolute Monocytes 08/21/2023 0.8  0.0 - 1.3 10e3/uL Final     Absolute Eosinophils 08/21/2023 0.2  0.0 - 0.7 10e3/uL Final     Absolute Basophils 08/21/2023 0.0  0.0 - 0.2 10e3/uL Final     Absolute Immature Granulocytes 08/21/2023 0.0  <=0.4 10e3/uL Final     CRP Inflammation 08/21/2023 <3.00  <5.00 mg/L Final        Lab Results   Component Value Date    WBC 8.5 08/21/2023    WBC 9.3 07/06/2022    WBC 7.1 06/23/2021    HGB 14.3 08/21/2023    HGB 12.1 01/01/2023    HGB 11.8 09/28/2022     08/21/2023     07/06/2022     06/23/2021    CHOL 206 (H) 06/23/2021    TRIG 224 (H) 06/23/2021    HDL 41 (L) 06/23/2021    ALT 37 08/21/2023    ALT 36 06/23/2021    AST 30 08/21/2023    AST 25 06/23/2021     08/21/2023     06/23/2021     03/06/2020    BUN 8.7 08/21/2023    BUN 7 (L) 06/23/2021    BUN 9 03/06/2020    CO2 21 (L) 08/21/2023    CO2 22 06/23/2021    CO2 27 03/06/2020    TSH 1.84 06/23/2021        Liver Function Studies -   Recent Labs   Lab Test 08/21/23  1116   PROTTOTAL 7.8   ALBUMIN 4.5   BILITOTAL 0.3   ALKPHOS 83   AST 30   ALT 37      Narrative & Impression   EXAM: US OB < 14 WEEKS SINGLE-TRANSABDOMINAL  LOCATION: [facility]  DATE/TIME: 7/8/2022 8:14 AM     INDICATION: Antepartum multigravida of advanced maternal age.  COMPARISON: None.  TECHNIQUE: Transabdominal scans were performed. Endovaginal ultrasound was performed to better visualize the embryo.     FINDINGS:  UTERUS: Single normal appearing intrauterine gestation sac. There is a 1.7 cm intramural fibroid involving the anterior aspect of uterus.     BIOMETRY:  Biparietal Diameter: 2.4 cm, 14 weeks, 2 days.  Head Circumference: 9.4 cm, 14 weeks, 2 days.  Abdominal Circumference: 7.1 cm, 13 weeks, 5 days.  Femur Length: 1.3 cm, 13 weeks, 6  days.     Estimated Fetal Weight: 83 g.  EFW Percentile: 72%.     Cervical Length: 3.4 cm     Composite Age by This US: 14 weeks, 0 days.  EDC by This US exam: 1/6/2023.     FETAL HEART RATE: 156 bpm.  AMNIOTIC FLUID: Normal.  PLACENTA: Anteriorly. The inferior placental margin is 1.6 cm from the internal cervical os.     RIGHT OVARY: Normal.  LEFT OVARY: Normal.                                                                      IMPRESSION:   1.  Single living intrauterine gestation at 14 weeks 0 days, EDC 1/6/2023.  2.  Anterior low-lying placenta with the inferior placental margin 1.6 cm from the internal cervical os. This will likely resolve as the pregnancy progresses.  3.  1.7 cm anterior uterine fibroid.          PREVIOUS ENDOSCOPY    No results found for this or any previous visit.    ASSESSMENT/PLAN:  Oli Mercedes is a 38 year old year old female with PMHx significant for overactive bladder, hemorrhoids, slowed transit constipation following with general GI for frequent stools.   Historically she describes 4 episodes of BSC type 3 associated with lower abdominal pain daily.  She has 4 children, all vaginal deliveries and is currently lactating. Labs include normal CBC, CMP (glucose 124), and normal CRP drawn in last month.  She did not complete the diagnostics ordered at our last visit.     #freq stools  #BSC type 3  Presenting symptoms are most consistent with under-treated constipation however the differential includes IBS-C (especially with abdominal pain component), pelvic floor dysfunction, and hypothyroidism vs other.  Consider inflammatory diarrhea.   She overall reports improved stooling pattern with the start of daily MiraLAX (2 caps at night)  -Plan from the last visit stands  -KUB for eval of stool burden   -Rx sent for MiraLAX which she is taking daily  -check TSH, celiac with IgA (though less likely to be the cause)  -Pending fecal calprotectin  -Recommended squatty potty    Future  consideration  1. Scheduled fiber  2. Due to language barrier, consultation and evaluation with pelvic floor center will be difficult   3. Consider in person  for future visits    #Reflux  Controlled on daily omperazole 40 mg without breakthrough or alarm sx.  Dose increased to 40 on 8/21, per notes.     -cont follow up with primary.      Future consideration  1. Consider down-titration of PPI after 3 months use, ~10/21/23 with close clinical follow-up. Can be done with GI or PCP.   2. If she gets off PPI, consider h. Pylori stool antigen; though she denies epigastric pain or dyspeptic symptoms today     #frequent urination   In setting of overactive bladder in problem list.   Exam benign, neg CVA tenderness, hemodynamically stable  -check UA, per patient request    Future consideration  1.  Referral to urology    #Pelvic pain  Unclear etiology.  Started after delivery 10 months prior.  We reviewed ultrasound done 8/2022 with normal ovaries without abnormalities.   -Check UA  - I asked her to follow-up with primary for further evaluation of this continued pain    #R ear itching  Given congenital changes, I defer management to ENT.  She is okay with this plan.   - I asked that she use a hair dryer to the afflicted ear after showering to help dry any moisture that is in the area  -She is okay to for me to send staff message to the ENT team for further    No orders of the defined types were placed in this encounter.      Colorectal cancer screening: aged 44    RTC 2-3 mo for short term follow-up.    Thank you for this consultation.  It was a pleasure to participate in the care of this patient; please contact me with any further questions.     Kelin Hopper PA-C    Follow up: As planned above. Today, I personally spent 40 minutes in direct face to face time with the patient, of which greater than 50% of the time was spent in patient education and counseling as described above. Approximately minutes were spent  on indirect care associated with the patient's consultation including but not limited to review of: patient medical records to date, clinic visits, hospital records, lab results, imaging studies, procedural documentation, and coordinating care with other providers. The findings from this review are summarized in the above note. All of the above accounted for a cumulative time of 40 minutes and was performed on the date of service.

## 2023-10-26 NOTE — PATIENT INSTRUCTIONS
It was a pleasure taking care of you today.  I've included a brief summary of our discussion and care plan from today's visit below.  Please review this information with your primary care provider.  _______________________________________________________________________    My recommendations are summarized as follows:    Get X-Ray, urine test, and blood test.   These are active in the system   Will send for powder for you to take daily   I can send a note over the ENT team about your request for itchy ears.   Make an appt with your primary care doctor to further discuss the pelvic pain you are having. We will get a urine today. We will hold the ultrasound today for your discussion with primary   Siena Bolanos MD  Family Medicine    Please call RN Care Coordinator Jeniffer at [phone] with any questions or concerns.    Return to GI Clinic in 3 months to review your progress.    _______________________________________________________________________    How do I schedule labs, imaging studies, or procedures that were ordered in clinic today?      Labs: To schedule lab appointment at the [facility], use my chart or call [phone]. If you have a [facility] lab closer to home where you are regularly seen you can give them a call.      Procedures: If a colonoscopy, upper endoscopy, breath test, esophageal manometry, or pH impedence was ordered today, our endoscopy team will call you to schedule this. If you have not heard from our endoscopy team within a week, please call [phone] option 2 to schedule.      Imaging Studies: If you were scheduled for a CT scan, X-ray, MRI, ultrasound, HIDA scan or other imaging study, please call [phone] to have this scheduled.      Referral: If a referral to another specialty was ordered, expect a phone call or follow instructions above. If you have not heard from anyone regarding your referral in a week, please call our clinic to check the status.      Who  do I call with any questions after my visit?  Please be in touch if there are any further questions that arise following today's visit.  There are multiple ways to contact your gastroenterology care team.       During business hours, you may reach a Gastroenterology nurse at [phone]     To schedule or reschedule an appointment, please call [phone].      You can always send a secure message through [employer].  [employer] messages are answered by your nurse or doctor typically within 24 hours.  Please allow extra time on weekends and holidays.       For urgent/emergent questions after business hours, you may reach the on-call GI Fellow by contacting the [facility]  at [phone].     How will I get the results of any tests ordered?    You will receive all of your results.  If you have signed up for [employer], any tests ordered at your visit will be available to you after your physician reviews them.  Typically this takes 1-2 weeks.  If there are urgent results that require a change in your care plan, your physician or nurse will call you to discuss the next steps.       What is [employer]?  [employer] is a secure way for you to access all of your healthcare records from the [facility].  It is a web based computer program, so you can sign on to it from any location.  It also allows you to send secure messages to your care team.  I recommend signing up for [employer] access if you have not already done so and are comfortable with using a computer.       How to I schedule a follow-up visit?  If you did not schedule a follow-up visit today, please call [phone] to schedule a follow-up office visit.      Sincerely,    Kelin Hopper PA-C  Gastroenterology

## 2023-10-26 NOTE — NURSING NOTE
"Chief Complaint   Patient presents with    Follow Up       Vitals:    10/26/23 1507   BP: (!) 121/90   Pulse: 85   SpO2: 100%   Weight: 58.5 kg (129 lb)   Height: 1.499 m (4' 11\")       Body mass index is 26.05 kg/m .    Blood pressure elevated. Provider notified.     Joanie Salazar    "

## 2023-10-27 ENCOUNTER — TELEPHONE (OUTPATIENT)
Dept: GASTROENTEROLOGY | Facility: CLINIC | Age: 38
End: 2023-10-27

## 2023-10-27 DIAGNOSIS — Z75.8 LANGUAGE BARRIER AFFECTING HEALTH CARE: ICD-10-CM

## 2023-10-27 DIAGNOSIS — R10.13 EPIGASTRIC PAIN: ICD-10-CM

## 2023-10-27 DIAGNOSIS — R19.5 ELEVATED FECAL CALPROTECTIN: ICD-10-CM

## 2023-10-27 DIAGNOSIS — R31.9 HEMATURIA, UNSPECIFIED TYPE: Primary | ICD-10-CM

## 2023-10-27 DIAGNOSIS — Z60.3 LANGUAGE BARRIER AFFECTING HEALTH CARE: ICD-10-CM

## 2023-10-27 LAB — IGA SERPL-MCNC: 192 MG/DL (ref 84–499)

## 2023-10-27 PROCEDURE — 83993 ASSAY FOR CALPROTECTIN FECAL: CPT | Performed by: PHYSICIAN ASSISTANT

## 2023-10-27 PROCEDURE — 99000 SPECIMEN HANDLING OFFICE-LAB: CPT | Performed by: PATHOLOGY

## 2023-10-27 SDOH — SOCIAL STABILITY - SOCIAL INSECURITY: ACCULTURATION DIFFICULTY: Z60.3

## 2023-10-27 NOTE — TELEPHONE ENCOUNTER
Please let her know that I am sending her to urology for hematuria and continued pelvic pain. Might benefit from further urology work up.   She does have a hx of overactive bladder on chart.     I am sending a note to ENT team for their team to reach out to her about the itchy ears     We will be in touch if any of the remaining labs are abnormal. Otherwise we will discuss at next visit.     Thanks  sy

## 2023-10-30 LAB
CALPROTECTIN STL-MCNT: 112 MG/KG (ref 0–49.9)
TTG IGA SER-ACNC: 0.9 U/ML
TTG IGG SER-ACNC: 0.6 U/ML

## 2023-10-30 NOTE — TELEPHONE ENCOUNTER
Borderline fecal Martir Pro.    I'd like to get an h.pylori but she needs to be off PPI for this test. Can cont PPI 40 mg ~11/20, at that time, she would have finished 3 mo of high dose PPI.     Her stooling pattern is much improved on daily MiraLAX.   I think repeat the stool test at our next visit to follow, otherwise okay to repeat in 2 months.  Order in system.     I sent a staff message sent to ENT care coordinators regarding ear itching.    Thank you   Kelin

## 2023-10-31 ENCOUNTER — PATIENT OUTREACH (OUTPATIENT)
Dept: GASTROENTEROLOGY | Facility: CLINIC | Age: 38
End: 2023-10-31
Payer: COMMERCIAL

## 2023-10-31 DIAGNOSIS — R10.13 EPIGASTRIC PAIN: ICD-10-CM

## 2023-10-31 DIAGNOSIS — R19.5 ELEVATED FECAL CALPROTECTIN: ICD-10-CM

## 2023-10-31 NOTE — PROGRESS NOTES
Unable to reach the patient to discuss the recommendations from Kelin TAYLOR  Will attempt to call again     Borderline fecal Martir Pro.     I'd like to get an h.pylori but she needs to be off PPI for this test. Can cont PPI 40 mg ~11/20, at that time, she would have finished 3 mo of high dose PPI.      Her stooling pattern is much improved on daily MiraLAX.   I think repeat the stool test at our next visit to follow, otherwise okay to repeat in 2 months.  Order in system.      I sent a staff message sent to ENT care coordinators regarding ear itching.          Chart reviewed an patient declined to schedule with [facility]. She wanted to go to [facility] in [place].  Patient was transferred to schedule

## 2023-11-04 ENCOUNTER — ALLIED HEALTH/NURSE VISIT (OUTPATIENT)
Dept: FAMILY MEDICINE | Facility: CLINIC | Age: 38
End: 2023-11-04
Payer: COMMERCIAL

## 2023-11-04 DIAGNOSIS — Z23 ENCOUNTER FOR IMMUNIZATION: Primary | ICD-10-CM

## 2023-11-04 PROCEDURE — 99207 PR NO CHARGE NURSE ONLY: CPT

## 2023-11-04 PROCEDURE — 90471 IMMUNIZATION ADMIN: CPT

## 2023-11-04 PROCEDURE — 90686 IIV4 VACC NO PRSV 0.5 ML IM: CPT

## 2023-11-09 NOTE — PROGRESS NOTES
Spoke with Say with the assistance of an .   She has not been taking he omeprazole due to them got giving it to her when she went. Spoke with pharmacy and they will refill and call her when it is ready.     Discussed completing the H pylori test now. She will  the collection container and complete the test. Discussed not taking the omeprazole until after the stool test is done     Miralax is helping with daily stooling.     She is having reflux symptoms since she not been on the omeprazole for more then 2 weeks.     Confimed appointment in Jan    Provided fecal tiffany results and will repeat at next appointment

## 2023-11-13 PROCEDURE — 83993 ASSAY FOR CALPROTECTIN FECAL: CPT | Performed by: PHYSICIAN ASSISTANT

## 2023-11-13 PROCEDURE — 99000 SPECIMEN HANDLING OFFICE-LAB: CPT | Performed by: PATHOLOGY

## 2023-11-13 PROCEDURE — 87338 HPYLORI STOOL AG IA: CPT | Performed by: PHYSICIAN ASSISTANT

## 2023-11-14 LAB — H PYLORI AG STL QL IA: NEGATIVE

## 2023-11-15 LAB — CALPROTECTIN STL-MCNT: 7.7 MG/KG (ref 0–49.9)

## 2023-11-21 ENCOUNTER — PATIENT OUTREACH (OUTPATIENT)
Dept: GASTROENTEROLOGY | Facility: CLINIC | Age: 38
End: 2023-11-21
Payer: COMMERCIAL

## 2023-11-21 NOTE — PROGRESS NOTES
Attempted to reach patient with the help of an  to inform her of the lab results     H.pylori Neg. Fecal calpro came back down to normal.     No answer     Will try again

## 2023-12-01 NOTE — PROGRESS NOTES
Attempted to reach patient and no answer   Will try again.   Patient has a follow up appointment on 1-

## 2024-01-03 ENCOUNTER — TELEPHONE (OUTPATIENT)
Dept: GASTROENTEROLOGY | Facility: CLINIC | Age: 39
End: 2024-01-03
Payer: COMMERCIAL

## 2024-01-03 NOTE — TELEPHONE ENCOUNTER
Called to remind patient of their upcoming appointment with our GI clinic, on Wed Jan 10th at 8:00am with Kelin Hopper. This appointment is scheduled as an in-person appt. Please arrive 15 minutes early to check in for your appointment. , if your appointment is virtual (video or telephone) you need to be in [place] for the visit. To reschedule or cancel patient to call [phone].    Joanie Salazar

## 2024-01-09 ENCOUNTER — PATIENT OUTREACH (OUTPATIENT)
Dept: CARE COORDINATION | Facility: CLINIC | Age: 39
End: 2024-01-09
Payer: COMMERCIAL

## 2024-01-09 ENCOUNTER — TELEPHONE (OUTPATIENT)
Dept: FAMILY MEDICINE | Facility: CLINIC | Age: 39
End: 2024-01-09
Payer: COMMERCIAL

## 2024-01-09 DIAGNOSIS — Z78.9 NEEDS ASSISTANCE WITH COMMUNITY RESOURCES: Primary | ICD-10-CM

## 2024-01-09 NOTE — CONFIDENTIAL NOTE
Patient requesting Zainab speaking [facility] worker. Care team may be out of date. Will refer to CCC to see if we can find out who she is connected to.     Prashanth Johnson MD

## 2024-01-09 NOTE — PROGRESS NOTES
Clinic Care Coordination Contact  Community Health Worker Initial Outreach    CHW Initial Information Gathering:  Referral Source: PCP  Preferred Hospital: [facility]  [phone]  Preferred Urgent Care: [facility] [place], [phone]  Current living arrangement:: I live in a private home with family  Type of residence:: Private home - stairs  Community Resources: None  Supplies Currently Used at Home: None  Equipment Currently Used at Home: none  Informal Support system:: Family  Transportation means:: Family, Medical transport  CHW Additional Questions  If ED/Hospital discharge, follow-up appointment scheduled as recommended?: N/A  Medication changes made following ED/Hospital discharge?: N/A  MyChart active?: No  Patient agreeable to assistance with activating MyChart?: No    Patient accepts CC: No, patient only needs [facility] services and referral placed to [facility]. Patient will be sent Care Coordination introduction letter for future reference.     Per patient, she only needs help with ARM services who can help with reading paperwork, form and etc. CHW placed referral to [facility] for [facility] services and CHW will further do outreach and PCP feel free to place new referral if need(s) identify.

## 2024-01-10 ENCOUNTER — OFFICE VISIT (OUTPATIENT)
Dept: GASTROENTEROLOGY | Facility: CLINIC | Age: 39
End: 2024-01-10
Attending: PHYSICIAN ASSISTANT
Payer: COMMERCIAL

## 2024-01-10 VITALS
HEART RATE: 96 BPM | WEIGHT: 126.4 LBS | BODY MASS INDEX: 25.48 KG/M2 | OXYGEN SATURATION: 98 % | DIASTOLIC BLOOD PRESSURE: 78 MMHG | HEIGHT: 59 IN | SYSTOLIC BLOOD PRESSURE: 106 MMHG

## 2024-01-10 DIAGNOSIS — Z60.3 LANGUAGE BARRIER AFFECTING HEALTH CARE: Primary | ICD-10-CM

## 2024-01-10 DIAGNOSIS — K59.00 CONSTIPATION, UNSPECIFIED CONSTIPATION TYPE: ICD-10-CM

## 2024-01-10 DIAGNOSIS — R35.0 URINARY FREQUENCY: ICD-10-CM

## 2024-01-10 DIAGNOSIS — Z75.8 LANGUAGE BARRIER AFFECTING HEALTH CARE: Primary | ICD-10-CM

## 2024-01-10 PROCEDURE — 99215 OFFICE O/P EST HI 40 MIN: CPT | Performed by: PHYSICIAN ASSISTANT

## 2024-01-10 SDOH — SOCIAL STABILITY - SOCIAL INSECURITY: ACCULTURATION DIFFICULTY: Z60.3

## 2024-01-10 ASSESSMENT — PAIN SCALES - GENERAL: PAINLEVEL: NO PAIN (0)

## 2024-01-10 NOTE — PROGRESS NOTES
GI CLINIC VISIT    Oli Mercedes is a 38 year old year old female with PMHx of overactive bladder, hemorrhoids, slowed transit constipation following with the [facility] GI group for frequent stooling    Today 1/10/24    Interval history - Fecal calpro now normal (7s), h.pylori NEG. UA with 4 RBC/hPF, UA showing likely contaminant. Referral to uro.   Last seen with me 2 mo ago to discuss frequent stooling     She is most interested in discussing ongoing pelvic pain - referral to uro was sent previously but no appt has been made. She is not aware of this referral. RBC 4/hpf. Having frequent urination. Ongoing pelvic pain since delivery of son about 12 months ago. No fevers, chills, visible hematuria, abnl vaginal discharge. Eating OK. denies weight loss. Denies  gestational dm/preDM.   Regarding GI sx - she feels very well and is w/o any sx today   Taking miralax which she feels helps with constipation - having a BM 2x a day, normal and formed bristol 4 w/o blood or black stools. No GI distress with having  a Bm.   Heartburn is managed w/ omeprazole. She is taking it daily but sometimes forgets, which will flare up her reflux sx. No breakthrough when she is taking it consistently. No dysphagia,odynphagia.     No fevers, chills, n/v/regular heartburn/dysphagia/odynphagia/bloody stools/black stools.     10/26/23 --     Seen with me initially 9/21 where suspected to have under-treated constipated.  Labs (TTG Ab, TSH) and fecal calpr, KUB ordered but not done. Started on scheduled miralax.     Here today for 1 mo follow-up    Not able to perform med rec with rooming team.   Started miralax 2 caps at night. Feels it helps her stooling. 1 in AM and 1 in PM, both BSC type 3. Good evacuation. No blood or black. She would like a refill.   Last visit, she had BSC type 5-7 w/o use of miralax.   Continues with lower abdominal discomfort.' Pelvic region cramping with radiation to entire lower back, typically notices it at later in evening.  "Not daily sensation. Massages area which improves it. Wants it checked out. Started when she was pregnant with son who is currently 10 mo old. Persisted after delivery. Unclear triggering or aggravating factors despite directed questioning.   Heartburn managed with omeprazole. Will aggravate with eating spicy foods.   No F/C/N/V/ Dysphagia/odynphagia.   Appetite stable. Weight stable.   Last menstrual cycle end of Sept 2023. She denies being pregnant.   Known right congenital aural atresia and microtia recently seen with ENT. She wants to know if I can send for drops or abx because it is itchy deep inside. Ongoing since childhood.  Per notes, she declined surgery to open meatus.   No other concerns or questions    Wt Readings from Last 10 Encounters:   01/10/24 57.3 kg (126 lb 6.4 oz)   10/26/23 58.5 kg (129 lb)   09/21/23 58.5 kg (129 lb)   08/21/23 57.9 kg (127 lb 12 oz)   08/07/23 58 kg (127 lb 12.8 oz)   04/06/23 59 kg (130 lb)   03/01/23 57.3 kg (126 lb 4 oz)   02/06/23 56.8 kg (125 lb 4 oz)   12/26/22 61.9 kg (136 lb 8 oz)   12/21/22 61.7 kg (136 lb)     HPI 9/21/23    1. Shares from 6a-10a, she has a BM once every hour. She is on toilet for 10-15 min trying to pass a BM. She denies straining. 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 She denies loose or soft stools (I described and pointed at BSC type 5-7). She tells me this stooling pattern has been present for a long time. Stools are brown in coloration.   She also does have lower abdominal \"discomfort\" that has been present for at least 1-2 years, it come and goes. I am not sure if it is present now (she did not answer the question) but when it is present, she describes it as mild.   She does not know of any triggers or alleviating factors for this pain. When directly asked, she denies any relationship of this pain with eating or having a BM - however despite professional  services,questions had to be " asked multiple times in order to get a response; and at times her response did not address or answer the question. Essentially I am not certain if she fully understood the questioning.   She has 4 children, all vaginal births. Unclear if she has had any complications such as tears. She is breast feeding currently.     2. She wants to have her urine checked. Tells me she is going frequently and has had UTI. She denies fever, chills. Appetite is stable.   Tells me she has pressure to lower abdomen.     3. She is taking omeprazole. Shares it manages her reflux. She denies dysphagia or odynphagia. She denies upper abd pain, particularly to epigastric region.     Wt Readings from Last 10 Encounters:   01/10/24 57.3 kg (126 lb 6.4 oz)   10/26/23 58.5 kg (129 lb)   09/21/23 58.5 kg (129 lb)   08/21/23 57.9 kg (127 lb 12 oz)   08/07/23 58 kg (127 lb 12.8 oz)   04/06/23 59 kg (130 lb)   03/01/23 57.3 kg (126 lb 4 oz)   02/06/23 56.8 kg (125 lb 4 oz)   12/26/22 61.9 kg (136 lb 8 oz)   12/21/22 61.7 kg (136 lb)     ROS  Per HPI     Family Hx  No other known family history or GI related malignancy (esophageal, gastric, pancreatic, liver or colon) or family history of IBD/celiac disease.     Social Hx   Lactating   ? use of NSAIDs or Tylenol. She denied use when I asked but per PCP note, she takes ibuprofen use   ? use of OTC herbal supplements/weight loss products.        PROBLEM LIST  Patient Active Problem List    Diagnosis Date Noted    Encounter for immunization 11/04/2023     Priority: Medium    Overactive bladder 04/06/2023     Priority: Medium    External hemorrhoids 10/26/2022     Priority: Medium    Slow transit constipation 10/26/2022     Priority: Medium    Language barrier affecting health care 12/13/2021     Priority: Medium    Microtia of right ear 11/18/2021     Priority: Medium    Congenital atresia of right external ear 11/11/2021     Priority: Medium    Mixed hearing loss, bilateral 11/11/2021     Priority:  Medium    Urticaria 08/12/2021     Priority: Medium       PERTINENT PAST MEDICAL HISTORY:  Past Medical History:   Diagnosis Date    Migraines     Urinary tract infection        PREVIOUS SURGERIES:  No past surgical history on file.    ALLERGIES:     Allergies   Allergen Reactions    Food      Eggplants--makes her skin itchy       PERTINENT MEDICATIONS:    Current Outpatient Medications:     acetaminophen (TYLENOL) 500 MG tablet, Take 1-2 tablets (500-1,000 mg) by mouth every 6 hours as needed for mild pain, Disp: 100 tablet, Rfl: 3    cetirizine (ZYRTEC) 10 MG tablet, Take 1 tablet (10 mg) by mouth daily, Disp: 90 tablet, Rfl: 3    ibuprofen (ADVIL/MOTRIN) 600 MG tablet, Take 1 tablet (600 mg) by mouth every 6 hours as needed for moderate pain (4-6), Disp: 90 tablet, Rfl: 3    melatonin 3 MG tablet, Take 1 tablet (3 mg) by mouth nightly as needed for sleep, Disp: 60 tablet, Rfl: 3    norethindrone (MICRONOR) 0.35 MG tablet, Take 1 tablet (0.35 mg) by mouth daily, Disp: 84 tablet, Rfl: 4    olopatadine (PATANOL) 0.1 % ophthalmic solution, Place 1 drop into both eyes 2 times daily, Disp: 5 mL, Rfl: 4    omeprazole (PRILOSEC) 40 MG DR capsule, Take 1 capsule (40 mg) by mouth daily as needed (heartburn), Disp: 90 capsule, Rfl: 1    polyethylene glycol (MIRALAX) 17 GM/Dose powder, 1/2 cap to 1 cap daily, or use as directed, Disp: 510 g, Rfl: 3    Prenatal Vit-Fe Fumarate-FA (PRENATAL VITAMIN) 27-0.8 MG TABS, Take 1 tablet by mouth daily, Disp: 90 tablet, Rfl: 4    SOCIAL HISTORY:  Social History     Socioeconomic History    Marital status:      Spouse name: Not on file    Number of children: 3    Years of education: 0    Highest education level: Never attended school   Occupational History    Not on file   Tobacco Use    Smoking status: Never     Passive exposure: Never    Smokeless tobacco: Former   Vaping Use    Vaping Use: Never used   Substance and Sexual Activity    Alcohol use: Never    Drug use: Never     Sexual activity: Yes     Partners: Male   Other Topics Concern    Not on file   Social History Narrative    Her children are ages 9, 8, and 3 (as of 2020).      Social Determinants of Health     Financial Resource Strain: Low Risk  (10/20/2021)    Overall Financial Resource Strain (CARDIA)     Difficulty of Paying Living Expenses: Not very hard   Food Insecurity: No Food Insecurity (10/20/2021)    Hunger Vital Sign     Worried About Running Out of Food in the Last Year: Never true     Ran Out of Food in the Last Year: Never true   Transportation Needs: Unmet Transportation Needs (10/20/2021)    PRAPARE - Transportation     Lack of Transportation (Medical): Yes     Lack of Transportation (Non-Medical): Yes   Physical Activity: Insufficiently Active (10/20/2021)    Exercise Vital Sign     Days of Exercise per Week: 3 days     Minutes of Exercise per Session: 10 min   Stress: Stress Concern Present (1/18/2023)    [employer] [place] [employer] - Occupational Stress Questionnaire     Feeling of Stress : To some extent   Social Connections: Socially Integrated (1/18/2023)    Social Connection and Isolation Panel [NHANES]     Frequency of Communication with Friends and Family: Twice a week     Frequency of Social Gatherings with Friends and Family: Twice a week     Attends Sabianism Services: 1 to 4 times per year     Active Member of Clubs or Organizations: No     Attends Club or Organization Meetings: 1 to 4 times per year     Marital Status:    Interpersonal Safety: Not At Risk (10/20/2021)    Humiliation, Afraid, Rape, and Kick questionnaire     Fear of Current or Ex-Partner: No     Emotionally Abused: No     Physically Abused: No     Sexually Abused: No   Housing Stability: Low Risk  (10/20/2021)    Housing Stability Vital Sign     Unable to Pay for Housing in the Last Year: No     Number of Places Lived in the Last Year: 1     Unstable Housing in the Last Year: No       FAMILY HISTORY:  Family  "History   Problem Relation Age of Onset    Hypertension Mother     Diabetes Mother     Hypertension Father     No Known Problems Brother     No Known Problems Sister     No Known Problems Son     No Known Problems Daughter     No Known Problems Daughter        Past/family/social history reviewed and no changes    PHYSICAL EXAMINATION:  Vitals reviewed: /78   Pulse 96   Ht 1.499 m (4' 11\")   Wt 57.3 kg (126 lb 6.4 oz)   SpO2 98%   BMI 25.53 kg/m    Wt:   Wt Readings from Last 2 Encounters:   01/10/24 57.3 kg (126 lb 6.4 oz)   10/26/23 58.5 kg (129 lb)        Constitutional: aaox3, cooperative, pleasant, not dyspneic/diaphoretic, no acute distress. Appears younger than stated aged.   Eyes: Sclera anicteric/injected  Neck: supple, active ROM w/o limitation or pain   CV: No edema  Respiratory: Unlabored breathing  Abd:  Nondistended, +bs, no hepatosplenomegaly, nontender to light and deep palpation, no peritoneal signs, neg De Los Santos's sign.  Some tenderness elicited with palpation of suprapubic region .   Skin: warm, perfused, no jaundice  Psych: Normal affect  MSK: Normal gait     PERTINENT STUDIES:    Office Visit on 08/21/2023   Component Date Value Ref Range Status    Sodium 08/21/2023 137  136 - 145 mmol/L Final    Potassium 08/21/2023 3.9  3.4 - 5.3 mmol/L Final    Chloride 08/21/2023 105  98 - 107 mmol/L Final    Carbon Dioxide (CO2) 08/21/2023 21 (L)  22 - 29 mmol/L Final    Anion Gap 08/21/2023 11  7 - 15 mmol/L Final    Urea Nitrogen 08/21/2023 8.7  6.0 - 20.0 mg/dL Final    Creatinine 08/21/2023 0.53  0.51 - 0.95 mg/dL Final    Calcium 08/21/2023 9.0  8.6 - 10.0 mg/dL Final    Glucose 08/21/2023 124 (H)  70 - 99 mg/dL Final    Alkaline Phosphatase 08/21/2023 83  35 - 104 U/L Final    AST 08/21/2023 30  0 - 45 U/L Final    ALT 08/21/2023 37  0 - 50 U/L Final    Protein Total 08/21/2023 7.8  6.4 - 8.3 g/dL Final    Albumin 08/21/2023 4.5  3.5 - 5.2 g/dL Final    Bilirubin Total 08/21/2023 0.3  <=1.2 " mg/dL Final    GFR Estimate 08/21/2023 >90  >60 mL/min/1.73m2 Final    Vitamin B12 08/21/2023 446  232 - 1,245 pg/mL Final    WBC Count 08/21/2023 8.5  4.0 - 11.0 10e3/uL Final    RBC Count 08/21/2023 4.92  3.80 - 5.20 10e6/uL Final    Hemoglobin 08/21/2023 14.3  11.7 - 15.7 g/dL Final    Hematocrit 08/21/2023 43.5  35.0 - 47.0 % Final    MCV 08/21/2023 88  78 - 100 fL Final    MCH 08/21/2023 29.1  26.5 - 33.0 pg Final    MCHC 08/21/2023 32.9  31.5 - 36.5 g/dL Final    RDW 08/21/2023 12.7  10.0 - 15.0 % Final    Platelet Count 08/21/2023 318  150 - 450 10e3/uL Final    % Neutrophils 08/21/2023 55  % Final    % Lymphocytes 08/21/2023 32  % Final    % Monocytes 08/21/2023 10  % Final    % Eosinophils 08/21/2023 3  % Final    % Basophils 08/21/2023 1  % Final    % Immature Granulocytes 08/21/2023 0  % Final    Absolute Neutrophils 08/21/2023 4.7  1.6 - 8.3 10e3/uL Final    Absolute Lymphocytes 08/21/2023 2.8  0.8 - 5.3 10e3/uL Final    Absolute Monocytes 08/21/2023 0.8  0.0 - 1.3 10e3/uL Final    Absolute Eosinophils 08/21/2023 0.2  0.0 - 0.7 10e3/uL Final    Absolute Basophils 08/21/2023 0.0  0.0 - 0.2 10e3/uL Final    Absolute Immature Granulocytes 08/21/2023 0.0  <=0.4 10e3/uL Final    CRP Inflammation 08/21/2023 <3.00  <5.00 mg/L Final        Lab Results   Component Value Date    WBC 8.5 08/21/2023    WBC 9.3 07/06/2022    WBC 7.1 06/23/2021    HGB 14.3 08/21/2023    HGB 12.1 01/01/2023    HGB 11.8 09/28/2022     08/21/2023     07/06/2022     06/23/2021    CHOL 206 (H) 06/23/2021    TRIG 224 (H) 06/23/2021    HDL 41 (L) 06/23/2021    ALT 37 08/21/2023    ALT 36 06/23/2021    AST 30 08/21/2023    AST 25 06/23/2021     08/21/2023     06/23/2021     03/06/2020    BUN 8.7 08/21/2023    BUN 7 (L) 06/23/2021    BUN 9 03/06/2020    CO2 21 (L) 08/21/2023    CO2 22 06/23/2021    CO2 27 03/06/2020    TSH 1.15 10/26/2023    TSH 1.84 06/23/2021        Liver Function Studies -   Recent  Labs   Lab Test 08/21/23  1116   PROTTOTAL 7.8   ALBUMIN 4.5   BILITOTAL 0.3   ALKPHOS 83   AST 30   ALT 37      Narrative & Impression   EXAM: US OB < 14 WEEKS SINGLE-TRANSABDOMINAL  LOCATION: [facility]  DATE/TIME: 7/8/2022 8:14 AM     INDICATION: Antepartum multigravida of advanced maternal age.  COMPARISON: None.  TECHNIQUE: Transabdominal scans were performed. Endovaginal ultrasound was performed to better visualize the embryo.     FINDINGS:  UTERUS: Single normal appearing intrauterine gestation sac. There is a 1.7 cm intramural fibroid involving the anterior aspect of uterus.     BIOMETRY:  Biparietal Diameter: 2.4 cm, 14 weeks, 2 days.  Head Circumference: 9.4 cm, 14 weeks, 2 days.  Abdominal Circumference: 7.1 cm, 13 weeks, 5 days.  Femur Length: 1.3 cm, 13 weeks, 6 days.     Estimated Fetal Weight: 83 g.  EFW Percentile: 72%.     Cervical Length: 3.4 cm     Composite Age by This US: 14 weeks, 0 days.  EDC by This US exam: 1/6/2023.     FETAL HEART RATE: 156 bpm.  AMNIOTIC FLUID: Normal.  PLACENTA: Anteriorly. The inferior placental margin is 1.6 cm from the internal cervical os.     RIGHT OVARY: Normal.  LEFT OVARY: Normal.                                                                      IMPRESSION:   1.  Single living intrauterine gestation at 14 weeks 0 days, EDC 1/6/2023.  2.  Anterior low-lying placenta with the inferior placental margin 1.6 cm from the internal cervical os. This will likely resolve as the pregnancy progresses.  3.  1.7 cm anterior uterine fibroid.        PREVIOUS ENDOSCOPY    No results found for this or any previous visit.    ASSESSMENT/PLAN:  Oli Mercedes is a 38 year old year old female with PMHx significant for overactive bladder, hemorrhoids, slowed transit constipation following with general GI for frequent stools. Her largest concern is that of frequent urination and ongoing pelvic pain x 12 months after her delivery. She has 4 children, all vaginal  deliveries. UA with microscopic hematuria and likely contaminant and she was referred to urology.    Labs include normal CBC, CMP (glucose 124), and normal CRP. Fecal calpro was borderline elevated but came back down to normal on repeat check. H.pylori was negative. Her stooling pattern improved with regular use of miralax.     #freq stools  #BSC type 3  Consistent with constipation as sx improved with regular use of miralax. Ddx can include IBS-C, pelvic floor dysfunction. Fecal calpro WNL and TSH wnl. Celiac serologies NEG.     -cont scheduled miralax  -Rec'd squatty potty    Future consideration  Scheduled fiber  Due to language barrier, consultation and evaluation with pelvic floor center will be difficult   Consider in person  for future visits    #Reflux  H.pylori stool test (off PPI) was NEG. Overall controlled on daily omperazole 40 mg, occ breakthrough if she forgets a dose  Dose increased to 40 mg on 8/21  -OK to cont PPI for now   -cont follow up with primary     Future consideration  Consider down-titration of PPI at future visit - can be done with GI or PCP.     #frequent urination   #microscopic hematuria   #pelvic pain - ongoing ~ 12 mo since delivery of last child   In setting of overactive bladder in problem list  Exam benign, neg CVA tenderness, hemodynamically stable  -UA with likely contaminated sample, but evidence of microscopic hematuria (4 RBC /hpf)   -scheduled appt with assistance of  -- 2/28 with Lanre Sneed at 8:45a (arrival)    - she was asked to make an appt with primary    - check HgbA1c       Orders Placed This Encounter   Procedures    Hemoglobin A1c       Colorectal cancer screening: aged 44    RTC 6mo for short term follow-up.    Thank you for this consultation.  It was a pleasure to participate in the care of this patient; please contact me with any further questions.     Kelin Hopper PA-C    Follow up: As planned above. Today, I personally spent 32 minutes  in direct face to face time with the patient, of which greater than 50% of the time was spent in patient education and counseling as described above. Approximately 8 minutes were spent on indirect care associated with the patient's consultation including but not limited to review of: patient medical records to date, clinic visits, hospital records, lab results, imaging studies, procedural documentation, and coordinating care with other providers. The findings from this review are summarized in the above note. All of the above accounted for a cumulative time of 40 minutes and was performed on the date of service.

## 2024-01-10 NOTE — PATIENT INSTRUCTIONS
It was a pleasure taking care of you today.  I've included a brief summary of our discussion and care plan from today's visit below.  Please review this information with your primary care provider.  _______________________________________________________________________    My recommendations are summarized as follows:    For the urine problem/frequent urination, pelvic pain x 12 months, blood in the urine,  you need to see the urine specialists - [phone].   2/28/24 at 8:45a arrival with Lanre Sneed   4th floor   We will check for diabetes with blood work today - please leave a sample at the lab   Continue using the miralax     Please call our clinic or send a [employer] message to us if you have any questions or concerns. [email or web address] messages are answered by your nurse or doctor typically within 24 hours.  Please allow extra time on weekends and holidays    Return to GI Clinic in 6 months to review your progress.    _______________________________________________________________________    How do I schedule labs, imaging studies, or procedures that were ordered in clinic today?      Labs: To schedule lab appointment at the [facility], use my chart or call [phone]. If you have a [place] lab closer to home where you are regularly seen you can give them a call.      Procedures: If a colonoscopy, upper endoscopy, breath test, esophageal manometry, or pH impedence was ordered today, our endoscopy team will call you to schedule this. If you have not heard from our endoscopy team within a week, please call [phone] option 2 to schedule.      Imaging Studies: If you were scheduled for a CT scan, X-ray, MRI, ultrasound, HIDA scan or other imaging study, please call [phone] to have this scheduled.      Referral: If a referral to another specialty was ordered, expect a phone call or follow instructions above. If you have not heard from anyone regarding your referral in a week, please call  our clinic to check the status.      Who do I call with any questions after my visit?  Please be in touch if there are any further questions that arise following today's visit.  There are multiple ways to contact your gastroenterology care team.       During business hours, you may reach a Gastroenterology nurse at [phone]     To schedule or reschedule an appointment, please call [phone].      You can always send a secure message through [employer].  [employer] messages are answered by your nurse or doctor typically within 24 hours.  Please allow extra time on weekends and holidays.       For urgent/emergent questions after business hours, you may reach the on-call GI Fellow by contacting the [facility]  at [phone].     How will I get the results of any tests ordered?    You will receive all of your results.  If you have signed up for [employer], any tests ordered at your visit will be available to you after your physician reviews them.  Typically this takes 1-2 weeks.  If there are urgent results that require a change in your care plan, your physician or nurse will call you to discuss the next steps.       What is [employer]?  [employer] is a secure way for you to access all of your healthcare records from the [facility].  It is a web based computer program, so you can sign on to it from any location.  It also allows you to send secure messages to your care team.  I recommend signing up for [employer] access if you have not already done so and are comfortable with using a computer.       How to I schedule a follow-up visit?  If you did not schedule a follow-up visit today, please call [phone] to schedule a follow-up office visit.      Sincerely,    Kelin Hopper PA-C  Gastroenterology

## 2024-01-10 NOTE — NURSING NOTE
"Chief Complaint   Patient presents with    Follow Up       Vitals:    01/10/24 0754   BP: 106/78   Pulse: 96   SpO2: 98%   Weight: 57.3 kg (126 lb 6.4 oz)   Height: 1.499 m (4' 11\")       Body mass index is 25.53 kg/m .    Joanie [employer]    "

## 2024-01-10 NOTE — LETTER
1/10/2024         RE: Say Daren  [address] [place] [address]        Dear Colleague,    Thank you for referring your patient, Say Daren, to the [facility] [place]. Please see a copy of my visit note below.      GI CLINIC VISIT    Say Daren is a 38 year old year old female with PMHx of overactive bladder, hemorrhoids, slowed transit constipation following with the [facility] group for frequent stooling    Today 1/10/24    Interval history - Fecal calpro now normal (7s), h.pylori NEG. UA with 4 RBC/hPF, UA showing likely contaminant. Referral to uro.   Last seen with me 2 mo ago to discuss frequent stooling     She is most interested in discussing ongoing pelvic pain - referral to uro was sent previously but no appt has been made. She is not aware of this referral. RBC 4/hpf. Having frequent urination. Ongoing pelvic pain since delivery of son about 12 months ago. No fevers, chills, visible hematuria, abnl vaginal discharge. Eating OK. denies weight loss. Denies  gestational dm/preDM.   Regarding GI sx - she feels very well and is w/o any sx today   Taking miralax which she feels helps with constipation - having a BM 2x a day, normal and formed bristol 4 w/o blood or black stools. No GI distress with having  a Bm.   Heartburn is managed w/ omeprazole. She is taking it daily but sometimes forgets, which will flare up her reflux sx. No breakthrough when she is taking it consistently. No dysphagia,odynphagia.     No fevers, chills, n/v/regular heartburn/dysphagia/odynphagia/bloody stools/black stools.     10/26/23 --     Seen with me initially 9/21 where suspected to have under-treated constipated.  Labs (TTG Ab, TSH) and fecal calpr, KUB ordered but not done. Started on scheduled miralax.     Here today for 1 mo follow-up    Not able to perform med rec with rooming team.   Started miralax 2 caps at night. Feels it helps her stooling. 1 in AM and 1 in PM, both BSC type 3. Good  "evacuation. No blood or black. She would like a refill.   Last visit, she had BSC type 5-7 w/o use of miralax.   Continues with lower abdominal discomfort.' Pelvic region cramping with radiation to entire lower back, typically notices it at later in evening. Not daily sensation. Massages area which improves it. Wants it checked out. Started when she was pregnant with son who is currently 10 mo old. Persisted after delivery. Unclear triggering or aggravating factors despite directed questioning.   Heartburn managed with omeprazole. Will aggravate with eating spicy foods.   No F/C/N/V/ Dysphagia/odynphagia.   Appetite stable. Weight stable.   Last menstrual cycle end of Sept 2023. She denies being pregnant.   Known right congenital aural atresia and microtia recently seen with ENT. She wants to know if I can send for drops or abx because it is itchy deep inside. Ongoing since childhood.  Per notes, she declined surgery to open meatus.   No other concerns or questions    Wt Readings from Last 10 Encounters:   01/10/24 57.3 kg (126 lb 6.4 oz)   10/26/23 58.5 kg (129 lb)   09/21/23 58.5 kg (129 lb)   08/21/23 57.9 kg (127 lb 12 oz)   08/07/23 58 kg (127 lb 12.8 oz)   04/06/23 59 kg (130 lb)   03/01/23 57.3 kg (126 lb 4 oz)   02/06/23 56.8 kg (125 lb 4 oz)   12/26/22 61.9 kg (136 lb 8 oz)   12/21/22 61.7 kg (136 lb)     HPI 9/21/23    1. Shares from 6a-10a, she has a BM once every hour. She is on toilet for 10-15 min trying to pass a BM. She denies straining. 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 She denies loose or soft stools (I described and pointed at BSC type 5-7). She tells me this stooling pattern has been present for a long time. Stools are brown in coloration.   She also does have lower abdominal \"discomfort\" that has been present for at least 1-2 years, it come and goes. I am not sure if it is present now (she did not answer the question) but when it is present, she " describes it as mild.   She does not know of any triggers or alleviating factors for this pain. When directly asked, she denies any relationship of this pain with eating or having a BM - however despite professional  services,questions had to be asked multiple times in order to get a response; and at times her response did not address or answer the question. Essentially I am not certain if she fully understood the questioning.   She has 4 children, all vaginal births. Unclear if she has had any complications such as tears. She is breast feeding currently.     2. She wants to have her urine checked. Tells me she is going frequently and has had UTI. She denies fever, chills. Appetite is stable.   Tells me she has pressure to lower abdomen.     3. She is taking omeprazole. Shares it manages her reflux. She denies dysphagia or odynphagia. She denies upper abd pain, particularly to epigastric region.     Wt Readings from Last 10 Encounters:   01/10/24 57.3 kg (126 lb 6.4 oz)   10/26/23 58.5 kg (129 lb)   09/21/23 58.5 kg (129 lb)   08/21/23 57.9 kg (127 lb 12 oz)   08/07/23 58 kg (127 lb 12.8 oz)   04/06/23 59 kg (130 lb)   03/01/23 57.3 kg (126 lb 4 oz)   02/06/23 56.8 kg (125 lb 4 oz)   12/26/22 61.9 kg (136 lb 8 oz)   12/21/22 61.7 kg (136 lb)     ROS  Per HPI     Family Hx  No other known family history or GI related malignancy (esophageal, gastric, pancreatic, liver or colon) or family history of IBD/celiac disease.     Social Hx   Lactating   ? use of NSAIDs or Tylenol. She denied use when I asked but per PCP note, she takes ibuprofen use   ? use of OTC herbal supplements/weight loss products.        PROBLEM LIST  Patient Active Problem List    Diagnosis Date Noted    Encounter for immunization 11/04/2023     Priority: Medium    Overactive bladder 04/06/2023     Priority: Medium    External hemorrhoids 10/26/2022     Priority: Medium    Slow transit constipation 10/26/2022     Priority: Medium    Language  barrier affecting health care 12/13/2021     Priority: Medium    Microtia of right ear 11/18/2021     Priority: Medium    Congenital atresia of right external ear 11/11/2021     Priority: Medium    Mixed hearing loss, bilateral 11/11/2021     Priority: Medium    Urticaria 08/12/2021     Priority: Medium       PERTINENT PAST MEDICAL HISTORY:  Past Medical History:   Diagnosis Date    Migraines     Urinary tract infection        PREVIOUS SURGERIES:  No past surgical history on file.    ALLERGIES:     Allergies   Allergen Reactions    Food      Eggplants--makes her skin itchy       PERTINENT MEDICATIONS:    Current Outpatient Medications:     acetaminophen (TYLENOL) 500 MG tablet, Take 1-2 tablets (500-1,000 mg) by mouth every 6 hours as needed for mild pain, Disp: 100 tablet, Rfl: 3    cetirizine (ZYRTEC) 10 MG tablet, Take 1 tablet (10 mg) by mouth daily, Disp: 90 tablet, Rfl: 3    ibuprofen (ADVIL/MOTRIN) 600 MG tablet, Take 1 tablet (600 mg) by mouth every 6 hours as needed for moderate pain (4-6), Disp: 90 tablet, Rfl: 3    melatonin 3 MG tablet, Take 1 tablet (3 mg) by mouth nightly as needed for sleep, Disp: 60 tablet, Rfl: 3    norethindrone (MICRONOR) 0.35 MG tablet, Take 1 tablet (0.35 mg) by mouth daily, Disp: 84 tablet, Rfl: 4    olopatadine (PATANOL) 0.1 % ophthalmic solution, Place 1 drop into both eyes 2 times daily, Disp: 5 mL, Rfl: 4    omeprazole (PRILOSEC) 40 MG DR capsule, Take 1 capsule (40 mg) by mouth daily as needed (heartburn), Disp: 90 capsule, Rfl: 1    polyethylene glycol (MIRALAX) 17 GM/Dose powder, 1/2 cap to 1 cap daily, or use as directed, Disp: 510 g, Rfl: 3    Prenatal Vit-Fe Fumarate-FA (PRENATAL VITAMIN) 27-0.8 MG TABS, Take 1 tablet by mouth daily, Disp: 90 tablet, Rfl: 4    SOCIAL HISTORY:  Social History     Socioeconomic History    Marital status:      Spouse name: Not on file    Number of children: 3    Years of education: 0    Highest education level: Never attended  school   Occupational History    Not on file   Tobacco Use    Smoking status: Never     Passive exposure: Never    Smokeless tobacco: Former   Vaping Use    Vaping Use: Never used   Substance and Sexual Activity    Alcohol use: Never    Drug use: Never    Sexual activity: Yes     Partners: Male   Other Topics Concern    Not on file   Social History Narrative    Her children are ages 9, 8, and 3 (as of 2020).      Social Determinants of Health     Financial Resource Strain: Low Risk  (10/20/2021)    Overall Financial Resource Strain (CARDIA)     Difficulty of Paying Living Expenses: Not very hard   Food Insecurity: No Food Insecurity (10/20/2021)    Hunger Vital Sign     Worried About Running Out of Food in the Last Year: Never true     Ran Out of Food in the Last Year: Never true   Transportation Needs: Unmet Transportation Needs (10/20/2021)    PRAPARE - Transportation     Lack of Transportation (Medical): Yes     Lack of Transportation (Non-Medical): Yes   Physical Activity: Insufficiently Active (10/20/2021)    Exercise Vital Sign     Days of Exercise per Week: 3 days     Minutes of Exercise per Session: 10 min   Stress: Stress Concern Present (1/18/2023)    [employer] - Occupational Stress Questionnaire     Feeling of Stress : To some extent   Social Connections: Socially Integrated (1/18/2023)    Social Connection and Isolation Panel [NHANES]     Frequency of Communication with Friends and Family: Twice a week     Frequency of Social Gatherings with Friends and Family: Twice a week     Attends Zoroastrianism Services: 1 to 4 times per year     Active Member of Clubs or Organizations: No     Attends Club or Organization Meetings: 1 to 4 times per year     Marital Status:    Interpersonal Safety: Not At Risk (10/20/2021)    Humiliation, Afraid, Rape, and Kick questionnaire     Fear of Current or Ex-Partner: No     Emotionally Abused: No     Physically Abused: No     Sexually  "Abused: No   Housing Stability: Low Risk  (10/20/2021)    Housing Stability Vital Sign     Unable to Pay for Housing in the Last Year: No     Number of Places Lived in the Last Year: 1     Unstable Housing in the Last Year: No       FAMILY HISTORY:  Family History   Problem Relation Age of Onset    Hypertension Mother     Diabetes Mother     Hypertension Father     No Known Problems Brother     No Known Problems Sister     No Known Problems Son     No Known Problems Daughter     No Known Problems Daughter        Past/family/social history reviewed and no changes    PHYSICAL EXAMINATION:  Vitals reviewed: /78   Pulse 96   Ht 1.499 m (4' 11\")   Wt 57.3 kg (126 lb 6.4 oz)   SpO2 98%   BMI 25.53 kg/m    Wt:   Wt Readings from Last 2 Encounters:   01/10/24 57.3 kg (126 lb 6.4 oz)   10/26/23 58.5 kg (129 lb)        Constitutional: aaox3, cooperative, pleasant, not dyspneic/diaphoretic, no acute distress. Appears younger than stated aged.   Eyes: Sclera anicteric/injected  Neck: supple, active ROM w/o limitation or pain   CV: No edema  Respiratory: Unlabored breathing  Abd:  Nondistended, +bs, no hepatosplenomegaly, nontender to light and deep palpation, no peritoneal signs, neg De Los Santos's sign.  Some tenderness elicited with palpation of suprapubic region .   Skin: warm, perfused, no jaundice  Psych: Normal affect  MSK: Normal gait     PERTINENT STUDIES:    Office Visit on 08/21/2023   Component Date Value Ref Range Status    Sodium 08/21/2023 137  136 - 145 mmol/L Final    Potassium 08/21/2023 3.9  3.4 - 5.3 mmol/L Final    Chloride 08/21/2023 105  98 - 107 mmol/L Final    Carbon Dioxide (CO2) 08/21/2023 21 (L)  22 - 29 mmol/L Final    Anion Gap 08/21/2023 11  7 - 15 mmol/L Final    Urea Nitrogen 08/21/2023 8.7  6.0 - 20.0 mg/dL Final    Creatinine 08/21/2023 0.53  0.51 - 0.95 mg/dL Final    Calcium 08/21/2023 9.0  8.6 - 10.0 mg/dL Final    Glucose 08/21/2023 124 (H)  70 - 99 mg/dL Final    Alkaline Phosphatase " 08/21/2023 83  35 - 104 U/L Final    AST 08/21/2023 30  0 - 45 U/L Final    ALT 08/21/2023 37  0 - 50 U/L Final    Protein Total 08/21/2023 7.8  6.4 - 8.3 g/dL Final    Albumin 08/21/2023 4.5  3.5 - 5.2 g/dL Final    Bilirubin Total 08/21/2023 0.3  <=1.2 mg/dL Final    GFR Estimate 08/21/2023 >90  >60 mL/min/1.73m2 Final    Vitamin B12 08/21/2023 446  232 - 1,245 pg/mL Final    WBC Count 08/21/2023 8.5  4.0 - 11.0 10e3/uL Final    RBC Count 08/21/2023 4.92  3.80 - 5.20 10e6/uL Final    Hemoglobin 08/21/2023 14.3  11.7 - 15.7 g/dL Final    Hematocrit 08/21/2023 43.5  35.0 - 47.0 % Final    MCV 08/21/2023 88  78 - 100 fL Final    MCH 08/21/2023 29.1  26.5 - 33.0 pg Final    MCHC 08/21/2023 32.9  31.5 - 36.5 g/dL Final    RDW 08/21/2023 12.7  10.0 - 15.0 % Final    Platelet Count 08/21/2023 318  150 - 450 10e3/uL Final    % Neutrophils 08/21/2023 55  % Final    % Lymphocytes 08/21/2023 32  % Final    % Monocytes 08/21/2023 10  % Final    % Eosinophils 08/21/2023 3  % Final    % Basophils 08/21/2023 1  % Final    % Immature Granulocytes 08/21/2023 0  % Final    Absolute Neutrophils 08/21/2023 4.7  1.6 - 8.3 10e3/uL Final    Absolute Lymphocytes 08/21/2023 2.8  0.8 - 5.3 10e3/uL Final    Absolute Monocytes 08/21/2023 0.8  0.0 - 1.3 10e3/uL Final    Absolute Eosinophils 08/21/2023 0.2  0.0 - 0.7 10e3/uL Final    Absolute Basophils 08/21/2023 0.0  0.0 - 0.2 10e3/uL Final    Absolute Immature Granulocytes 08/21/2023 0.0  <=0.4 10e3/uL Final    CRP Inflammation 08/21/2023 <3.00  <5.00 mg/L Final        Lab Results   Component Value Date    WBC 8.5 08/21/2023    WBC 9.3 07/06/2022    WBC 7.1 06/23/2021    HGB 14.3 08/21/2023    HGB 12.1 01/01/2023    HGB 11.8 09/28/2022     08/21/2023     07/06/2022     06/23/2021    CHOL 206 (H) 06/23/2021    TRIG 224 (H) 06/23/2021    HDL 41 (L) 06/23/2021    ALT 37 08/21/2023    ALT 36 06/23/2021    AST 30 08/21/2023    AST 25 06/23/2021     08/21/2023      06/23/2021     03/06/2020    BUN 8.7 08/21/2023    BUN 7 (L) 06/23/2021    BUN 9 03/06/2020    CO2 21 (L) 08/21/2023    CO2 22 06/23/2021    CO2 27 03/06/2020    TSH 1.15 10/26/2023    TSH 1.84 06/23/2021        Liver Function Studies -   Recent Labs   Lab Test 08/21/23  1116   PROTTOTAL 7.8   ALBUMIN 4.5   BILITOTAL 0.3   ALKPHOS 83   AST 30   ALT 37      Narrative & Impression   EXAM: US OB < 14 WEEKS SINGLE-TRANSABDOMINAL  LOCATION: [facility]  DATE/TIME: 7/8/2022 8:14 AM     INDICATION: Antepartum multigravida of advanced maternal age.  COMPARISON: None.  TECHNIQUE: Transabdominal scans were performed. Endovaginal ultrasound was performed to better visualize the embryo.     FINDINGS:  UTERUS: Single normal appearing intrauterine gestation sac. There is a 1.7 cm intramural fibroid involving the anterior aspect of uterus.     BIOMETRY:  Biparietal Diameter: 2.4 cm, 14 weeks, 2 days.  Head Circumference: 9.4 cm, 14 weeks, 2 days.  Abdominal Circumference: 7.1 cm, 13 weeks, 5 days.  Femur Length: 1.3 cm, 13 weeks, 6 days.     Estimated Fetal Weight: 83 g.  EFW Percentile: 72%.     Cervical Length: 3.4 cm     Composite Age by This US: 14 weeks, 0 days.  EDC by This US exam: 1/6/2023.     FETAL HEART RATE: 156 bpm.  AMNIOTIC FLUID: Normal.  PLACENTA: Anteriorly. The inferior placental margin is 1.6 cm from the internal cervical os.     RIGHT OVARY: Normal.  LEFT OVARY: Normal.                                                                      IMPRESSION:   1.  Single living intrauterine gestation at 14 weeks 0 days, EDC 1/6/2023.  2.  Anterior low-lying placenta with the inferior placental margin 1.6 cm from the internal cervical os. This will likely resolve as the pregnancy progresses.  3.  1.7 cm anterior uterine fibroid.        PREVIOUS ENDOSCOPY    No results found for this or any previous visit.    ASSESSMENT/PLAN:  Oli Mercedes is a 38 year old year old female with PMHx significant  for overactive bladder, hemorrhoids, slowed transit constipation following with general GI for frequent stools. Her largest concern is that of frequent urination and ongoing pelvic pain x 12 months after her delivery. She has 4 children, all vaginal deliveries. UA with microscopic hematuria and likely contaminant and she was referred to urology.    Labs include normal CBC, CMP (glucose 124), and normal CRP. Fecal calpro was borderline elevated but came back down to normal on repeat check. H.pylori was negative. Her stooling pattern improved with regular use of miralax.     #freq stools  #BSC type 3  Consistent with constipation as sx improved with regular use of miralax. Ddx can include IBS-C, pelvic floor dysfunction. Fecal calpro WNL and TSH wnl. Celiac serologies NEG.     -cont scheduled miralax  -Rec'd squatty potty    Future consideration  Scheduled fiber  Due to language barrier, consultation and evaluation with pelvic floor center will be difficult   Consider in person  for future visits    #Reflux  H.pylori stool test (off PPI) was NEG. Overall controlled on daily omperazole 40 mg, occ breakthrough if she forgets a dose  Dose increased to 40 mg on 8/21  -OK to cont PPI for now   -cont follow up with primary     Future consideration  Consider down-titration of PPI at future visit - can be done with GI or PCP.     #frequent urination   #microscopic hematuria   #pelvic pain - ongoing ~ 12 mo since delivery of last child   In setting of overactive bladder in problem list  Exam benign, neg CVA tenderness, hemodynamically stable  -UA with likely contaminated sample, but evidence of microscopic hematuria (4 RBC /hpf)   -scheduled appt with assistance of  -- 2/28 with Lanre Sneed at 8:45a (arrival)    - she was asked to make an appt with primary    - check HgbA1c       Orders Placed This Encounter   Procedures    Hemoglobin A1c       Colorectal cancer screening: aged 44    RTC 6mo for short  term follow-up.    Thank you for this consultation.  It was a pleasure to participate in the care of this patient; please contact me with any further questions.     Kelin Hopper PA-C    Follow up: As planned above. Today, I personally spent 32 minutes in direct face to face time with the patient, of which greater than 50% of the time was spent in patient education and counseling as described above. Approximately 8 minutes were spent on indirect care associated with the patient's consultation including but not limited to review of: patient medical records to date, clinic visits, hospital records, lab results, imaging studies, procedural documentation, and coordinating care with other providers. The findings from this review are summarized in the above note. All of the above accounted for a cumulative time of 40 minutes and was performed on the date of service.       Again, thank you for allowing me to participate in the care of your patient.        Sincerely    Kelin Hopper PA-C

## 2024-01-10 NOTE — LETTER
[facility] [address] [address]  [phone]  Dept: [phone]      1/10/2024    Re: Say Daren      TO WHOM IT MAY CONCERN:    Say Daren  was seen on 1/10/24. Her  Mr. Mercedes accompanied her to the appointment.  Please excuse him  until 1/11/24 due to office appointment.    Cordially,          Kelin Hopper PA-C  [facility] [place]

## 2024-01-11 ENCOUNTER — LAB (OUTPATIENT)
Dept: LAB | Facility: CLINIC | Age: 39
End: 2024-01-11
Payer: COMMERCIAL

## 2024-01-11 DIAGNOSIS — R35.0 URINARY FREQUENCY: ICD-10-CM

## 2024-01-11 LAB — HBA1C MFR BLD: 6 %

## 2024-01-11 PROCEDURE — 36415 COLL VENOUS BLD VENIPUNCTURE: CPT | Performed by: PATHOLOGY

## 2024-01-11 PROCEDURE — 99000 SPECIMEN HANDLING OFFICE-LAB: CPT | Performed by: PATHOLOGY

## 2024-01-11 PROCEDURE — 83036 HEMOGLOBIN GLYCOSYLATED A1C: CPT | Performed by: PHYSICIAN ASSISTANT

## 2024-01-19 ENCOUNTER — PATIENT OUTREACH (OUTPATIENT)
Dept: GASTROENTEROLOGY | Facility: CLINIC | Age: 39
End: 2024-01-19
Payer: COMMERCIAL

## 2024-01-19 NOTE — PROGRESS NOTES
Tried to call the patient with ama Lamb  to provide the below information     Please let her know she is prediabetic.  Low carb and a focus on Mediterranean diet eating pattern tends to help with this.  She should make an appt with her primary to discuss further - should be checked (trended) in 3-6 mo with primary to monitor.       No answer     Will try to call again

## 2024-01-25 ENCOUNTER — OFFICE VISIT (OUTPATIENT)
Dept: FAMILY MEDICINE | Facility: CLINIC | Age: 39
End: 2024-01-25
Payer: COMMERCIAL

## 2024-01-25 VITALS
HEART RATE: 103 BPM | WEIGHT: 129.5 LBS | RESPIRATION RATE: 16 BRPM | TEMPERATURE: 98.2 F | HEIGHT: 59 IN | SYSTOLIC BLOOD PRESSURE: 110 MMHG | OXYGEN SATURATION: 99 % | DIASTOLIC BLOOD PRESSURE: 70 MMHG | BODY MASS INDEX: 26.11 KG/M2

## 2024-01-25 DIAGNOSIS — Q16.1 CONGENITAL ATRESIA OF RIGHT EXTERNAL EAR: ICD-10-CM

## 2024-01-25 DIAGNOSIS — K52.9 FREQUENT STOOLS: ICD-10-CM

## 2024-01-25 DIAGNOSIS — R73.03 PREDIABETES: ICD-10-CM

## 2024-01-25 DIAGNOSIS — R14.0 BLOATING: ICD-10-CM

## 2024-01-25 DIAGNOSIS — K02.9 TOOTH DECAY: ICD-10-CM

## 2024-01-25 DIAGNOSIS — G44.209 TENSION HEADACHE: ICD-10-CM

## 2024-01-25 DIAGNOSIS — H10.13 ALLERGIC CONJUNCTIVITIS, BILATERAL: ICD-10-CM

## 2024-01-25 DIAGNOSIS — R19.5 HARD STOOL: ICD-10-CM

## 2024-01-25 DIAGNOSIS — R12 HEARTBURN: ICD-10-CM

## 2024-01-25 DIAGNOSIS — N32.81 OVERACTIVE BLADDER: Primary | ICD-10-CM

## 2024-01-25 DIAGNOSIS — Z91.09 ENVIRONMENTAL ALLERGIES: ICD-10-CM

## 2024-01-25 PROCEDURE — 99214 OFFICE O/P EST MOD 30 MIN: CPT | Performed by: FAMILY MEDICINE

## 2024-01-25 RX ORDER — OMEPRAZOLE 40 MG/1
40 CAPSULE, DELAYED RELEASE ORAL DAILY PRN
Qty: 90 CAPSULE | Refills: 1 | Status: SHIPPED | OUTPATIENT
Start: 2024-01-25 | End: 2024-07-28

## 2024-01-25 RX ORDER — ACETAMINOPHEN 500 MG
500-1000 TABLET ORAL EVERY 6 HOURS PRN
Qty: 100 TABLET | Refills: 3 | Status: SHIPPED | OUTPATIENT
Start: 2024-01-25 | End: 2024-04-29

## 2024-01-25 RX ORDER — OLOPATADINE HYDROCHLORIDE 1 MG/ML
1 SOLUTION/ DROPS OPHTHALMIC 2 TIMES DAILY
Qty: 5 ML | Refills: 4 | Status: SHIPPED | OUTPATIENT
Start: 2024-01-25

## 2024-01-25 RX ORDER — CETIRIZINE HYDROCHLORIDE 10 MG/1
10 TABLET ORAL DAILY
Qty: 90 TABLET | Refills: 3 | Status: SHIPPED | OUTPATIENT
Start: 2024-01-25

## 2024-01-25 RX ORDER — IBUPROFEN 600 MG/1
600 TABLET, FILM COATED ORAL EVERY 6 HOURS PRN
Qty: 80 TABLET | Refills: 3 | Status: SHIPPED | OUTPATIENT
Start: 2024-01-25 | End: 2024-04-29

## 2024-01-25 RX ORDER — POLYETHYLENE GLYCOL 3350 17 G/17G
POWDER, FOR SOLUTION ORAL
Qty: 510 G | Refills: 3 | Status: SHIPPED | OUTPATIENT
Start: 2024-01-25 | End: 2024-04-29

## 2024-01-25 NOTE — PROGRESS NOTES
"  Assessment & Plan     Overactive bladder  Has appt with urology in 1 month. Has seen a different urology group in the past but does not recall if the medicine helped - she had to stop due to pregnancy anyway.     Prediabetes  A1c 6.0%, ok to recheck in 3-6 months. Discussed dietary changes and what to expect moving forward. Declined referral to DM educator.     Congenital atresia of right external ear  See notes from ENT. Declined intervention because it would not restore hearing to be equal to the other ear. Recommend follow up 6 months to clean.     Allergic conjunctivitis, bilateral  - olopatadine (PATANOL) 0.1 % ophthalmic solution  Dispense: 5 mL; Refill: 4    Bloating  Frequent stools  Hard stool  Says miralax is what helps her most. Will refill.   - polyethylene glycol (MIRALAX) 17 GM/Dose powder  Dispense: 510 g; Refill: 3    Environmental allergies  - cetirizine (ZYRTEC) 10 MG tablet  Dispense: 90 tablet; Refill: 3    Heartburn  - omeprazole (PRILOSEC) 40 MG DR capsule  Dispense: 90 capsule; Refill: 1    Tension headache  - ibuprofen (ADVIL/MOTRIN) 600 MG tablet  Dispense: 80 tablet; Refill: 3        BMI  Estimated body mass index is 26.16 kg/m  as calculated from the following:    Height as of this encounter: 1.499 m (4' 11\").    Weight as of this encounter: 58.7 kg (129 lb 8 oz).   Weight management plan: Discussed healthy diet and exercise guidelines    Return in about 3 months (around 4/25/2024) for Routine preventive.    >30 minutes spent on the date of the encounter doing chart review, history and exam, documentation and further activities per the note      Subjective   Say is a 39 year old, presenting for the following health issues:  Follow Up (Bladder pain getting worse )    History of Present Illness       Reason for visit:  Follow up bladder pain        Seen by GI for frequent stooling.   More worried about urinary frequency - has been chronic. Referred to urology a few times. " "    Cetirizine    She brought in a handful of pills without bottles and asking what they are and if they should be refilled. Most of them were refilled and identified - see assessment/plan.   Yellow tablet with FOB or F0B?       Objective    /70   Pulse 103   Temp 98.2  F (36.8  C) (Oral)   Resp 16   Ht 1.499 m (4' 11\")   Wt 58.7 kg (129 lb 8 oz)   LMP 01/05/2024 (Approximate)   SpO2 99%   Breastfeeding Yes   BMI 26.16 kg/m    Body mass index is 26.16 kg/m .  Physical Exam   GENERAL: alert and no distress  NECK: no adenopathy, no asymmetry, masses, or scars  RESP: lungs clear to auscultation - no rales, rhonchi or wheezes  CV: regular rate and rhythm, normal S1 S2, no S3 or S4, no murmur, click or rub, no peripheral edema  ABDOMEN: soft, nontender, no hepatosplenomegaly, no masses and bowel sounds normal  MS: no gross musculoskeletal defects noted, no edema    Lab on 01/11/2024   Component Date Value Ref Range Status    Hemoglobin A1C 01/11/2024 6.0 (H)  <5.7 % Final    Normal <5.7%   Prediabetes 5.7-6.4%    Diabetes 6.5% or higher     Note: Adopted from ADA consensus guidelines.     No results found for any visits on 01/25/24.  No results found for this or any previous visit (from the past 24 hour(s)).        Signed Electronically by: Prashanth Johnson MD    "

## 2024-02-09 NOTE — TELEPHONE ENCOUNTER
MEDICAL RECORDS REQUEST   [facility]  Urology Clinic  [address] [place] [address]  PHONE: [phone]  Fax: [phone]        FUTURE VISIT INFORMATION                                                   Say NED Mercedes: 1985 scheduled for future visit at [facility]    APPOINTMENT INFORMATION:  Date: 2024  Provider:  Lanre Sneed PA-C  Reason for Visit/Diagnosis: Hematuria    REFERRAL INFORMATION:  Referring provider:  Kelin Hopper PA-C in [facility]      RECORDS REQUESTED FOR VISIT                                                     NOTES  STATUS/DETAILS   OFFICE NOTE from referring provider  yes, 01/10/2024 -- Kelin Hopper PA-C in [facility]   OFFICE NOTE from other specialist  yes   MEDICATION LIST  yes   LABS     URINALYSIS (UA)  yes     PRE-VISIT CHECKLIST      Joint diagnostic appointment coordinated correctly          (ensure right order & amount of time) Yes   RECORD COLLECTION COMPLETE Yes

## 2024-02-28 ENCOUNTER — PRE VISIT (OUTPATIENT)
Dept: UROLOGY | Facility: CLINIC | Age: 39
End: 2024-02-28

## 2024-04-05 RX ORDER — ACETAMINOPHEN AND CODEINE PHOSPHATE 120; 12 MG/5ML; MG/5ML
0.35 SOLUTION ORAL DAILY
Qty: 84 TABLET | Refills: 2 | Status: SHIPPED | OUTPATIENT
Start: 2024-04-05

## 2024-04-29 ENCOUNTER — OFFICE VISIT (OUTPATIENT)
Dept: FAMILY MEDICINE | Facility: CLINIC | Age: 39
End: 2024-04-29
Payer: COMMERCIAL

## 2024-04-29 VITALS
HEIGHT: 59 IN | BODY MASS INDEX: 25.82 KG/M2 | DIASTOLIC BLOOD PRESSURE: 71 MMHG | TEMPERATURE: 97.5 F | OXYGEN SATURATION: 98 % | SYSTOLIC BLOOD PRESSURE: 105 MMHG | HEART RATE: 109 BPM | RESPIRATION RATE: 16 BRPM | WEIGHT: 128.08 LBS

## 2024-04-29 DIAGNOSIS — K12.1 MOUTH ULCERS: ICD-10-CM

## 2024-04-29 DIAGNOSIS — G44.209 TENSION HEADACHE: ICD-10-CM

## 2024-04-29 DIAGNOSIS — K02.9 TOOTH DECAY: ICD-10-CM

## 2024-04-29 DIAGNOSIS — Z00.00 ROUTINE GENERAL MEDICAL EXAMINATION AT A HEALTH CARE FACILITY: Primary | ICD-10-CM

## 2024-04-29 DIAGNOSIS — R14.0 BLOATING: ICD-10-CM

## 2024-04-29 DIAGNOSIS — Q16.1 CONGENITAL ATRESIA OF RIGHT EXTERNAL EAR: ICD-10-CM

## 2024-04-29 DIAGNOSIS — R19.5 HARD STOOL: ICD-10-CM

## 2024-04-29 DIAGNOSIS — R35.0 URINARY FREQUENCY: ICD-10-CM

## 2024-04-29 DIAGNOSIS — G44.219 EPISODIC TENSION-TYPE HEADACHE, NOT INTRACTABLE: ICD-10-CM

## 2024-04-29 DIAGNOSIS — R73.03 PREDIABETES: ICD-10-CM

## 2024-04-29 DIAGNOSIS — K52.9 FREQUENT STOOLS: ICD-10-CM

## 2024-04-29 PROCEDURE — 99396 PREV VISIT EST AGE 40-64: CPT | Performed by: FAMILY MEDICINE

## 2024-04-29 PROCEDURE — 99213 OFFICE O/P EST LOW 20 MIN: CPT | Mod: 25 | Performed by: FAMILY MEDICINE

## 2024-04-29 RX ORDER — IBUPROFEN 600 MG/1
600 TABLET, FILM COATED ORAL EVERY 6 HOURS PRN
Qty: 80 TABLET | Refills: 3 | Status: SHIPPED | OUTPATIENT
Start: 2024-04-29

## 2024-04-29 RX ORDER — ACETAMINOPHEN 500 MG
500-1000 TABLET ORAL EVERY 6 HOURS PRN
Qty: 100 TABLET | Refills: 3 | Status: SHIPPED | OUTPATIENT
Start: 2024-04-29

## 2024-04-29 RX ORDER — CHLORHEXIDINE GLUCONATE ORAL RINSE 1.2 MG/ML
15 SOLUTION DENTAL 2 TIMES DAILY
Qty: 473 ML | Refills: 0 | Status: SHIPPED | OUTPATIENT
Start: 2024-04-29 | End: 2024-07-25

## 2024-04-29 RX ORDER — POLYETHYLENE GLYCOL 3350 17 G/17G
1 POWDER, FOR SOLUTION ORAL DAILY
Qty: 510 G | Refills: 11 | Status: SHIPPED | OUTPATIENT
Start: 2024-04-29

## 2024-04-29 NOTE — PROGRESS NOTES
Preventive Care Visit  [facility] MD, [facility]  Apr 29, 2024      Assessment & Plan     Routine general medical examination at a health care facility  Declined mammo and covid booster.     Bloating  Frequent stools  Hard stool  Appt with GI rescheduled to July. Will refill miralax - she takes daily or she gets abd pain.   - polyethylene glycol (MIRALAX) 17 GM/Dose powder  Dispense: 510 g; Refill: 11    Urinary frequency  Will have schedulers help her with rescheduling urology.     Congenital atresia of right external ear  Follows with ENT. Declined surgical intervention for now, was going to have surgery but then got pregnant 2 years ago    Prediabetes  Recheck A1c in 3 months per patient preference. Previously 6%.     Mouth ulcers  Tooth decay  Has been brushing consistently and still having issues. Recommend having a yearly cleaning and trial chlorhexidine for 2 weeks. No signs of infection or abscess.   - acetaminophen (TYLENOL) 500 MG tablet  Dispense: 100 tablet; Refill: 3  - chlorhexidine (PERIDEX) 0.12 % solution  Dispense: 473 mL; Refill: 0    Episodic tension-type headache, not intractable  Something to smell to make headaches go away? Unsure but will be only OTC. Will find out for patient.         Counseling  Appropriate preventive services were discussed with this patient, including applicable screening as appropriate for fall prevention, nutrition, physical activity, Tobacco-use cessation, weight loss and cognition.  Checklist reviewing preventive services available has been given to the patient.  Reviewed patient's diet, addressing concerns and/or questions.   The patient was instructed to see the dentist every 6 months.       Return in about 2 months (around 6/29/2024).      Subjective   Say is a 39 year old, presenting for the following:  Physical        4/29/2024    11:28 AM   Additional Questions   Roomed by Mehnaz JARVIS MA   Accompanied by [facility]  Care Directive  Patient does not have a Health Care Directive or Living Will: Discussed advance care planning with patient; information given to patient to review.    HPI     Overactive bladder  Has appt with urology in 1 month. Has seen a different urology group in the past but does not recall if the medicine helped - she had to stop due to pregnancy anyway.   Today:   She did not have this rescheduled, did not realize she had an appt.      Prediabetes  A1c 6.0%, ok to recheck in 3-6 months. Discussed dietary changes and what to expect moving forward. Declined referral to DM educator.   Today:   Declined recheck. Will place a future lab.        Bloating  Frequent stools  Hard stool  Says miralax is what helps her most. Will refill.   Today:   She still has a lot of abd pain and sometimes constipated. She takes miralax daily or she gets a lot of pain. She tested negative for hpylori.             4/29/2024   General Health   How would you rate your overall physical health? (!) FAIR         4/29/2024   Nutrition   Three or more servings of calcium each day? Yes   Diet: Regular (no restrictions)   How many servings of fruit and vegetables per day? (!) 2-3   How many sweetened beverages each day? (!) 2           4/29/2024   Social Factors   Worry food won't last until get money to buy more No   Food not last or not have enough money for food? No   Do you have housing?  Yes   Are you worried about losing your housing? No   Lack of transportation? No   Unable to get utilities (heat,electricity)? No         4/29/2024   Dental   Dentist two times every year? (!) NO              Today's PHQ-2 Score:       1/25/2024     2:39 PM   PHQ-2 ( 1999 [employer])   Q1: Little interest or pleasure in doing things 0   Q2: Feeling down, depressed or hopeless 0   PHQ-2 Score 0   Q1: Little interest or pleasure in doing things Not at all   Q2: Feeling down, depressed or hopeless Not at all   PHQ-2 Score 0         4/29/2024   Substance Use  "  Alcohol more than 3/day or more than 7/wk No   Do you use any other substances recreationally? No     Social History     Tobacco Use    Smoking status: Never     Passive exposure: Never    Smokeless tobacco: Former   Vaping Use    Vaping status: Never Used   Substance Use Topics    Alcohol use: Never    Drug use: Never             4/29/2024   Breast Cancer Screening   Family history of breast, colon, or ovarian cancer? No / Unknown              4/29/2024   STI Screening   New sexual partner(s) since last STI/HIV test? No     History of abnormal Pap smear: NO - age 30-65 PAP every 5 years with negative HPV co-testing recommended        Latest Ref Rng & Units 6/23/2021     9:54 AM   PAP / HPV   PAP Negative for squamous intraepithelial lesion or malignancy. Negative for squamous intraepithelial lesion or malignancy  Electronically signed by Ariadna Tony CT (ASCP) on 6/30/2021 at  1:40 PM      HPV 16 DNA NEG Negative    HPV 18 DNA NEG Negative    Other HR HPV NEG Negative      ASCVD Risk   The 10-year ASCVD risk score (Chas DK, et al., 2019) is: 0.7%    Values used to calculate the score:      Age: 39 years      Sex: Female      Is Non- : No      Diabetic: No      Tobacco smoker: No      Systolic Blood Pressure: 105 mmHg      Is BP treated: No      HDL Cholesterol: 41 mg/dL      Total Cholesterol: 206 mg/dL        4/29/2024   Contraception/Family Planning   Questions about contraception or family planning No        Reviewed and updated as needed this visit by Provider   Tobacco  Allergies  Meds  Problems  Med Hx  Surg Hx  Fam Hx                   Objective    Exam  /71   Pulse 109   Temp 97.5  F (36.4  C) (Temporal)   Resp 16   Ht 1.499 m (4' 11\")   Wt 58.1 kg (128 lb 1.3 oz)   SpO2 98%   Breastfeeding Yes   BMI 25.87 kg/m     Estimated body mass index is 25.87 kg/m  as calculated from the following:    Height as of this encounter: 1.499 m (4' 11\").    " Weight as of this encounter: 58.1 kg (128 lb 1.3 oz).    Physical Exam  GENERAL: alert and no distress  NECK: no adenopathy, no asymmetry, masses, or scars  RESP: lungs clear to auscultation - no rales, rhonchi or wheezes  CV: regular rate and rhythm, normal S1 S2, no S3 or S4, no murmur, click or rub, no peripheral edema  ABDOMEN: soft, nontender, no hepatosplenomegaly, no masses and bowel sounds normal  MS: no gross musculoskeletal defects noted, no edema        Signed Electronically by: Prashanth Johnson MD

## 2024-04-29 NOTE — PATIENT INSTRUCTIONS
Preventive Care Advice   This is general advice given by our system to help you stay healthy. However, your care team may have specific advice just for you. Please talk to your care team about your preventive care needs.  Nutrition  Eat 5 or more servings of fruits and vegetables each day.  Try wheat bread, brown rice and whole grain pasta (instead of white bread, rice, and pasta).  Get enough calcium and vitamin D. Check the label on foods and aim for 100% of the RDA (recommended daily allowance).  Lifestyle  Exercise at least 150 minutes each week   (30 minutes a day, 5 days a week).  Do muscle strengthening activities 2 days a week. These help control your weight and prevent disease.  No smoking.  Wear sunscreen to prevent skin cancer.  Have a dental exam and cleaning every 6 months.  Yearly exams  See your health care team every year to talk about:  Any changes in your health.  Any medicines your care team has prescribed.  Preventive care, family planning, and ways to prevent chronic diseases.  Shots (vaccines)   HPV shots (up to age 26), if you've never had them before.  Hepatitis B shots (up to age 59), if you've never had them before.  COVID-19 shot: Get this shot when it's due.  Flu shot: Get a flu shot every year.  Tetanus shot: Get a tetanus shot every 10 years.  Pneumococcal, hepatitis A, and RSV shots: Ask your care team if you need these based on your risk.  Shingles shot (for age 50 and up).  General health tests  Diabetes screening:  Starting at age 35, Get screened for diabetes at least every 3 years.  If you are younger than age 35, ask your care team if you should be screened for diabetes.  Cholesterol test: At age 39, start having a cholesterol test every 5 years, or more often if advised.  Bone density scan (DEXA): At age 50, ask your care team if you should have this scan for osteoporosis (brittle bones).  Hepatitis C: Get tested at least once in your life.  STIs (sexually transmitted  infections)  Before age 24: Ask your care team if you should be screened for STIs.  After age 24: Get screened for STIs if you're at risk. You are at risk for STIs (including HIV) if:  You are sexually active with more than one person.  You don't use condoms every time.  You or a partner was diagnosed with a sexually transmitted infection.  If you are at risk for HIV, ask about PrEP medicine to prevent HIV.  Get tested for HIV at least once in your life, whether you are at risk for HIV or not.  Cancer screening tests  Cervical cancer screening: If you have a cervix, begin getting regular cervical cancer screening tests at age 21. Most people who have regular screenings with normal results can stop after age 65. Talk about this with your provider.  Breast cancer scan (mammogram): If you've ever had breasts, begin having regular mammograms starting at age 40. This is a scan to check for breast cancer.  Colon cancer screening: It is important to start screening for colon cancer at age 45.  Have a colonoscopy test every 10 years (or more often if you're at risk) Or, ask your provider about stool tests like a FIT test every year or Cologuard test every 3 years.  To learn more about your testing options, visit: https://www.[employer]/854146.pdf.  For help making a decision, visit: https://bit.ly/ax71197.  Prostate cancer screening test: If you have a prostate and are age 55 to 69, ask your provider if you would benefit from a yearly prostate cancer screening test.  Lung cancer screening: If you are a current or former smoker age 50 to 80, ask your care team if ongoing lung cancer screenings are right for you.  For informational purposes only. Not to replace the advice of your health care provider. Copyright   2023 [employer]. All rights reserved. Clinically reviewed by the [employer]. [employer] 146823 - REV 01/24.

## 2024-04-30 ENCOUNTER — TELEPHONE (OUTPATIENT)
Dept: FAMILY MEDICINE | Facility: CLINIC | Age: 39
End: 2024-04-30
Payer: COMMERCIAL

## 2024-04-30 NOTE — TELEPHONE ENCOUNTER
General Call    Contacts         Type Contact Phone/Fax    04/30/2024 09:33 AM CDT Phone (Outgoing) Daren, Say (Self) [phone] (M)    No Answer/Busy -  w/ lang line          Reason for Call:  Notify urolgoy appointment scheduled. Confirm date and time. Need to request transportation if needed.  07/02 (soonest appt)      What are your questions or concerns:    07/02 [facility]   [address] [place] [address]  [address] [facility] [address]    Department Phone: [phone]

## 2024-04-30 NOTE — TELEPHONE ENCOUNTER
MEDICAL RECORDS REQUEST   [facility]  Urology Clinic  [address] [place] [address]  PHONE: [phone]  Fax: [phone]        FUTURE VISIT INFORMATION                                                   Say DarenNED: 1985 scheduled for future visit at [facility]    APPOINTMENT INFORMATION:  Date: 24  Provider:  Claudia Reddy CNP   Reason for Visit/Diagnosis: Hematuria, unspecified type [R31.9], referred by Kelin Hopper PA-C in [facility], per [facility] at [facility], records in [facility]     REFERRAL INFORMATION:  Referring provider:  Kelin Hopper PA-C @ [facility]    RECORDS REQUESTED FOR VISIT                                                      NOTES   STATUS/DETAILS   OFFICE NOTE from referring provider   yes, 01/10/2024 -- Kelin Hopper PA-C in [facility]   OFFICE NOTE from other specialist   yes   MEDICATION LIST   yes   LABS       URINALYSIS (UA)   yes      PRE-VISIT CHECKLIST        Joint diagnostic appointment coordinated correctly          (ensure right order & amount of time) Yes   RECORD COLLECTION COMPLETE Yes

## 2024-05-08 NOTE — TELEPHONE ENCOUNTER
General Call  Reason for Call:  Called pt to inform her of upcoming urology Appointment    What are your questions or concerns:  TC provided pt with  phone number, But pt states that she has someone who can take her to her appointment for the time being.     Okay to leave a detailed message?: Yes at Cell number on file:    Telephone Information:   Mobile [phone]

## 2024-06-25 ENCOUNTER — TELEPHONE (OUTPATIENT)
Dept: GASTROENTEROLOGY | Facility: CLINIC | Age: 39
End: 2024-06-25
Payer: COMMERCIAL

## 2024-06-25 NOTE — TELEPHONE ENCOUNTER
Called to remind patient of their upcoming appointment with our GI clinic, on Friday July 5th at 9:00am with Kelin Hopper. This appointment is scheduled as an in-person appt. Please arrive 15 minutes early to check in for your appointment. , if your appointment is virtual (video or telephone) you need to be in [place] for the visit. To reschedule or cancel patient to call [phone].    Joanie Salazar

## 2024-06-27 NOTE — TELEPHONE ENCOUNTER
Called to remind patient of their upcoming appointment with our GI clinic, on Friday July 5th at 9:00am with Kelin Hopper. This appointment is scheduled as an in-person appt. Please arrive 15 minutes early to check in for your appointment. , if your appointment is virtual (video or telephone) you need to be in [place] for the visit. To reschedule or cancel patient to call [phone].     Joanie Salazar

## 2024-07-01 NOTE — PROGRESS NOTES
Assessment and Plan:     Assessment: 39 year old female with a two-year history of pelvic pain and urinary frequency. Urine culture from January contaminated. Suspect she may continue to have a UTI now, given her symptoms. Urine sample obtained today.     Plan:  -UA/UC today. If infected, will treat accordingly. If negative for infection but blood remains, will pursue additional steps of hematuria eval to include a MARSHALL and cystoscopy. If negative for both, could consider trial of anticholinergic to help her urinary frequency.     Claudia Reddy, CNP  Department of Urology           Chief Complaint:   Microscopic hematuria, urinary frequency         History of Present Illness:    Oli Mercedes is a 39 year old female with a history of hearing impairment and overactive bladder who presents for consult regarding hematuria, referred by her GI provider, Kelin Hopper PA-C. Patient was seen today with the assistance of a professional Zainab . She has been experiencing pelvic pain, which has been ongoing for the past two years, as well as frequent urination. She had a UTI last year in January, though the culture was contaminated. More recently, she had a UA on 10/26/23 that showed 4 RBCs; no culture run at that time.          Past Medical History:     Past Medical History:   Diagnosis Date    Migraines     Urinary tract infection             Past Surgical History:   No past surgical history on file.         Medications     Current Outpatient Medications   Medication Sig Dispense Refill    acetaminophen (TYLENOL) 500 MG tablet Take 1-2 tablets (500-1,000 mg) by mouth every 6 hours as needed for mild pain 100 tablet 3    cetirizine (ZYRTEC) 10 MG tablet Take 1 tablet (10 mg) by mouth daily 90 tablet 3    chlorhexidine (PERIDEX) 0.12 % solution Swish and spit 15 mLs in mouth 2 times daily 473 mL 0    ibuprofen (ADVIL/MOTRIN) 600 MG tablet Take 1 tablet (600 mg) by mouth every 6 hours as needed for moderate pain or  "fever (take with food) 80 tablet 3    norethindrone (MICRONOR) 0.35 MG tablet TAKE 1 TABLET(0.35 MG) BY MOUTH DAILY 84 tablet 2    olopatadine (PATANOL) 0.1 % ophthalmic solution Place 1 drop into both eyes 2 times daily 5 mL 4    omeprazole (PRILOSEC) 40 MG DR capsule Take 1 capsule (40 mg) by mouth daily as needed (heartburn) 90 capsule 1    polyethylene glycol (MIRALAX) 17 GM/Dose powder Take 17 g (1 Capful) by mouth daily 1/2 cap to 1 cap daily, or use as directed 510 g 11    Prenatal Vit-Fe Fumarate-FA (PRENATAL VITAMIN) 27-0.8 MG TABS Take 1 tablet by mouth daily 90 tablet 4     No current facility-administered medications for this visit.            Allergies:   Food         Review of Systems:  From intake questionnaire   Negative 14 system review except as noted on HPI, nurse's note.         Physical Exam:   Patient is a 39 year old  female   Vitals: Blood pressure 123/86, height 1.486 m (4' 10.5\"), weight 57.6 kg (127 lb), SpO2 97%, currently breastfeeding.  General Appearance Adult: Alert, no acute distress, oriented  Lungs: no respiratory distress, or pursed lip breathing  Heart: No obvious jugular venous distension present  Abdomen: soft, nontender, no organomegaly or masses, Body mass index is 26.09 kg/m .  : deferred      Labs and Pathology:    I personally reviewed all applicable laboratory data and went over findings with patient  Significant for:    CBC RESULTS:  Recent Labs   Lab Test 08/21/23  1116 01/01/23  0743 09/28/22  1011 07/06/22  1523 06/23/21  1000 03/06/20  1906   WBC 8.5  --   --  9.3 7.1 8.2   HGB 14.3 12.1 11.8 14.3 14.7 14.0     --   --  279 308 270        BMP RESULTS:  Recent Labs   Lab Test 08/21/23  1116 06/23/21  1000 03/06/20  1906    138 139   POTASSIUM 3.9 3.9 3.6   CHLORIDE 105 105 106   CO2 21* 22 27   ANIONGAP 11 11 6   * 84 94   BUN 8.7 7* 9   CR 0.53 0.70 0.73   GFRESTIMATED >90 >60 >60   GFRESTBLACK  --  >60 >60   RUSSELL 9.0 9.2 9.1       UA RESULTS: "   Recent Labs   Lab Test 10/26/23  1654 01/01/23  1412 08/31/22  0933 05/31/22  1044   SG 1.010 1.013 1.015 1.020   URINEPH 6.5 7.5* 7.0 7.0   NITRITE Negative Negative Negative Negative   RBCU 4* >182*  --  None Seen   WBCU 8* 97*  --  0-5

## 2024-07-02 ENCOUNTER — OFFICE VISIT (OUTPATIENT)
Dept: UROLOGY | Facility: CLINIC | Age: 39
End: 2024-07-02
Attending: PHYSICIAN ASSISTANT
Payer: COMMERCIAL

## 2024-07-02 ENCOUNTER — PRE VISIT (OUTPATIENT)
Dept: UROLOGY | Facility: CLINIC | Age: 39
End: 2024-07-02

## 2024-07-02 ENCOUNTER — VIRTUAL VISIT (OUTPATIENT)
Dept: INTERPRETER SERVICES | Facility: CLINIC | Age: 39
End: 2024-07-02

## 2024-07-02 VITALS
SYSTOLIC BLOOD PRESSURE: 123 MMHG | OXYGEN SATURATION: 97 % | HEIGHT: 59 IN | DIASTOLIC BLOOD PRESSURE: 86 MMHG | BODY MASS INDEX: 25.6 KG/M2 | WEIGHT: 127 LBS

## 2024-07-02 DIAGNOSIS — Z60.3 LANGUAGE BARRIER AFFECTING HEALTH CARE: ICD-10-CM

## 2024-07-02 DIAGNOSIS — Z75.8 LANGUAGE BARRIER AFFECTING HEALTH CARE: ICD-10-CM

## 2024-07-02 DIAGNOSIS — R31.9 HEMATURIA, UNSPECIFIED TYPE: ICD-10-CM

## 2024-07-02 DIAGNOSIS — R31.29 MICROSCOPIC HEMATURIA: Primary | ICD-10-CM

## 2024-07-02 LAB
ALBUMIN UR-MCNC: NEGATIVE MG/DL
APPEARANCE UR: CLEAR
BILIRUB UR QL STRIP: NEGATIVE
COLOR UR AUTO: ABNORMAL
GLUCOSE UR STRIP-MCNC: NEGATIVE MG/DL
HGB UR QL STRIP: NEGATIVE
KETONES UR STRIP-MCNC: NEGATIVE MG/DL
LEUKOCYTE ESTERASE UR QL STRIP: ABNORMAL
MUCOUS THREADS #/AREA URNS LPF: PRESENT /LPF
NITRATE UR QL: NEGATIVE
PH UR STRIP: 5.5 [PH] (ref 5–7)
RBC URINE: 1 /HPF
SP GR UR STRIP: 1.01 (ref 1–1.03)
SQUAMOUS EPITHELIAL: 4 /HPF
UROBILINOGEN UR STRIP-MCNC: NORMAL MG/DL
WBC URINE: 1 /HPF

## 2024-07-02 PROCEDURE — 87086 URINE CULTURE/COLONY COUNT: CPT | Performed by: NURSE PRACTITIONER

## 2024-07-02 PROCEDURE — T1013 SIGN LANG/ORAL INTERPRETER: HCPCS | Mod: U4

## 2024-07-02 PROCEDURE — 99000 SPECIMEN HANDLING OFFICE-LAB: CPT | Performed by: PATHOLOGY

## 2024-07-02 PROCEDURE — 99203 OFFICE O/P NEW LOW 30 MIN: CPT | Performed by: NURSE PRACTITIONER

## 2024-07-02 PROCEDURE — 81001 URINALYSIS AUTO W/SCOPE: CPT | Performed by: PATHOLOGY

## 2024-07-02 SDOH — SOCIAL STABILITY - SOCIAL INSECURITY: ACCULTURATION DIFFICULTY: Z60.3

## 2024-07-02 ASSESSMENT — PAIN SCALES - GENERAL: PAINLEVEL: NO PAIN (1)

## 2024-07-02 NOTE — PATIENT INSTRUCTIONS
UROLOGY CLINIC VISIT PATIENT INSTRUCTIONS    Urinalysis and urine culture    If you have any issues, questions or concerns in the meantime, do not hesitate to contact us at [phone] or via [employer].     Claudia Reddy, CNP  Department of Urology

## 2024-07-02 NOTE — LETTER
7/2/2024       RE: Oli Mercedes  [address] [place] [address]     Dear Colleague,    Thank you for referring your patient, Oli Mercedes, to the [facility]. Please see a copy of my visit note below.         Assessment and Plan:     Assessment: 39 year old female with a two-year history of pelvic pain and urinary frequency. Urine culture from January contaminated. Suspect she may continue to have a UTI now, given her symptoms. Urine sample obtained today.     Plan:  -UA/UC today. If infected, will treat accordingly. If negative for infection but blood remains, will pursue additional steps of hematuria eval to include a MARSHALL and cystoscopy.     Claudia Reddy, CNP  Department of Urology           Chief Complaint:   Microscopic hematuria, urinary frequency         History of Present Illness:    Oli Mercedes is a 39 year old female with a history of hearing impairment and overactive bladder who presents for consult regarding hematuria, referred by her GI provider, Kelin Hopper PA-C. Patient was seen today with the assistance of a professional Zainab . She has been experiencing pelvic pain, which has been ongoing for the past two years, as well as frequent urination. She had a UTI last year in January, though the culture was contaminated. More recently, she had a UA on 10/26/23 that showed 4 RBCs; no culture run at that time.          Past Medical History:     Past Medical History:   Diagnosis Date    Migraines     Urinary tract infection             Past Surgical History:   No past surgical history on file.         Medications     Current Outpatient Medications   Medication Sig Dispense Refill    acetaminophen (TYLENOL) 500 MG tablet Take 1-2 tablets (500-1,000 mg) by mouth every 6 hours as needed for mild pain 100 tablet 3    cetirizine (ZYRTEC) 10 MG tablet Take 1 tablet (10 mg) by mouth daily 90 tablet 3     "chlorhexidine (PERIDEX) 0.12 % solution Swish and spit 15 mLs in mouth 2 times daily 473 mL 0    ibuprofen (ADVIL/MOTRIN) 600 MG tablet Take 1 tablet (600 mg) by mouth every 6 hours as needed for moderate pain or fever (take with food) 80 tablet 3    norethindrone (MICRONOR) 0.35 MG tablet TAKE 1 TABLET(0.35 MG) BY MOUTH DAILY 84 tablet 2    olopatadine (PATANOL) 0.1 % ophthalmic solution Place 1 drop into both eyes 2 times daily 5 mL 4    omeprazole (PRILOSEC) 40 MG DR capsule Take 1 capsule (40 mg) by mouth daily as needed (heartburn) 90 capsule 1    polyethylene glycol (MIRALAX) 17 GM/Dose powder Take 17 g (1 Capful) by mouth daily 1/2 cap to 1 cap daily, or use as directed 510 g 11    Prenatal Vit-Fe Fumarate-FA (PRENATAL VITAMIN) 27-0.8 MG TABS Take 1 tablet by mouth daily 90 tablet 4     No current facility-administered medications for this visit.            Allergies:   Food         Review of Systems:  From intake questionnaire   Negative 14 system review except as noted on HPI, nurse's note.         Physical Exam:   Patient is a 39 year old  female   Vitals: Blood pressure 123/86, height 1.486 m (4' 10.5\"), weight 57.6 kg (127 lb), SpO2 97%, currently breastfeeding.  General Appearance Adult: Alert, no acute distress, oriented  Lungs: no respiratory distress, or pursed lip breathing  Heart: No obvious jugular venous distension present  Abdomen: soft, nontender, no organomegaly or masses, Body mass index is 26.09 kg/m .  : deferred      Labs and Pathology:    I personally reviewed all applicable laboratory data and went over findings with patient  Significant for:    CBC RESULTS:  Recent Labs   Lab Test 08/21/23  1116 01/01/23  0743 09/28/22  1011 07/06/22  1523 06/23/21  1000 03/06/20  1906   WBC 8.5  --   --  9.3 7.1 8.2   HGB 14.3 12.1 11.8 14.3 14.7 14.0     --   --  279 308 270        BMP RESULTS:  Recent Labs   Lab Test 08/21/23  1116 06/23/21  1000 03/06/20  1906    138 139   POTASSIUM " 3.9 3.9 3.6   CHLORIDE 105 105 106   CO2 21* 22 27   ANIONGAP 11 11 6   * 84 94   BUN 8.7 7* 9   CR 0.53 0.70 0.73   GFRESTIMATED >90 >60 >60   GFRESTBLACK  --  >60 >60   RUSSELL 9.0 9.2 9.1       UA RESULTS:   Recent Labs   Lab Test 10/26/23  1654 01/01/23  1412 08/31/22  0933 05/31/22  1044   SG 1.010 1.013 1.015 1.020   URINEPH 6.5 7.5* 7.0 7.0   NITRITE Negative Negative Negative Negative   RBCU 4* >182*  --  None Seen   WBCU 8* 97*  --  0-5

## 2024-07-02 NOTE — NURSING NOTE
"Chief Complaint   Patient presents with    Consult     Pt states here for urinating alot       Blood pressure 123/86, height 1.486 m (4' 10.5\"), weight 57.6 kg (127 lb), SpO2 97%, currently breastfeeding. Body mass index is 26.09 kg/m .    Patient Active Problem List   Diagnosis    Urticaria    Congenital atresia of right external ear    Mixed hearing loss, bilateral    Microtia of right ear    Language barrier affecting health care    External hemorrhoids    Slow transit constipation    Overactive bladder    Encounter for immunization    Prediabetes       Allergies   Allergen Reactions    Food      Eggplants--makes her skin itchy       Current Outpatient Medications   Medication Sig Dispense Refill    acetaminophen (TYLENOL) 500 MG tablet Take 1-2 tablets (500-1,000 mg) by mouth every 6 hours as needed for mild pain 100 tablet 3    cetirizine (ZYRTEC) 10 MG tablet Take 1 tablet (10 mg) by mouth daily 90 tablet 3    chlorhexidine (PERIDEX) 0.12 % solution Swish and spit 15 mLs in mouth 2 times daily 473 mL 0    ibuprofen (ADVIL/MOTRIN) 600 MG tablet Take 1 tablet (600 mg) by mouth every 6 hours as needed for moderate pain or fever (take with food) 80 tablet 3    norethindrone (MICRONOR) 0.35 MG tablet TAKE 1 TABLET(0.35 MG) BY MOUTH DAILY 84 tablet 2    olopatadine (PATANOL) 0.1 % ophthalmic solution Place 1 drop into both eyes 2 times daily 5 mL 4    omeprazole (PRILOSEC) 40 MG DR capsule Take 1 capsule (40 mg) by mouth daily as needed (heartburn) 90 capsule 1    polyethylene glycol (MIRALAX) 17 GM/Dose powder Take 17 g (1 Capful) by mouth daily 1/2 cap to 1 cap daily, or use as directed 510 g 11    Prenatal Vit-Fe Fumarate-FA (PRENATAL VITAMIN) 27-0.8 MG TABS Take 1 tablet by mouth daily 90 tablet 4     Pt states they have given her medications for pain, and urinating and the medications for urinating are not working really at all. She can feel the pain in the groin to lower back. Pain is like a 1-2. Has some " feelings of incomplete emptying, has frequency.     Social History     Tobacco Use    Smoking status: Never     Passive exposure: Never    Smokeless tobacco: Former   Vaping Use    Vaping status: Never Used   Substance Use Topics    Alcohol use: Never    Drug use: Never       Humberto Hutton  7/2/2024  9:51 AM

## 2024-07-03 LAB — BACTERIA UR CULT: NO GROWTH

## 2024-07-09 ENCOUNTER — TELEPHONE (OUTPATIENT)
Dept: UROLOGY | Facility: CLINIC | Age: 39
End: 2024-07-09
Payer: COMMERCIAL

## 2024-07-09 NOTE — TELEPHONE ENCOUNTER
Left message for pt on behalf of Claudia Reddy using Zainab  to relay the following:    Her urine is negative for blood now, so will defer additional workup, though for her frequency we can offer her a trial of tolterodine 2 mg daily. If she's interested in trying that, can see me back in 2-3 months to check in on progress. If not interested, she can follow up PRN.     Provided RN call back number for additional follow up.     ASHUTOSH Briceño  Care Coordinator- Urology   [phone]

## 2024-07-25 ENCOUNTER — OFFICE VISIT (OUTPATIENT)
Dept: FAMILY MEDICINE | Facility: CLINIC | Age: 39
End: 2024-07-25
Payer: COMMERCIAL

## 2024-07-25 VITALS
OXYGEN SATURATION: 99 % | TEMPERATURE: 98 F | DIASTOLIC BLOOD PRESSURE: 71 MMHG | HEART RATE: 101 BPM | RESPIRATION RATE: 16 BRPM | HEIGHT: 59 IN | SYSTOLIC BLOOD PRESSURE: 116 MMHG | BODY MASS INDEX: 25.96 KG/M2 | WEIGHT: 128.75 LBS

## 2024-07-25 DIAGNOSIS — Z12.31 VISIT FOR SCREENING MAMMOGRAM: ICD-10-CM

## 2024-07-25 DIAGNOSIS — Q16.1 CONGENITAL ATRESIA OF RIGHT EXTERNAL EAR: ICD-10-CM

## 2024-07-25 DIAGNOSIS — K12.1 MOUTH ULCERS: ICD-10-CM

## 2024-07-25 DIAGNOSIS — R73.03 PREDIABETES: Primary | ICD-10-CM

## 2024-07-25 DIAGNOSIS — H90.6 MIXED HEARING LOSS, BILATERAL: ICD-10-CM

## 2024-07-25 DIAGNOSIS — Q17.2 MICROTIA OF RIGHT EAR: ICD-10-CM

## 2024-07-25 LAB — HBA1C MFR BLD: 5.7 % (ref 0–5.6)

## 2024-07-25 PROCEDURE — G2211 COMPLEX E/M VISIT ADD ON: HCPCS | Performed by: FAMILY MEDICINE

## 2024-07-25 PROCEDURE — T1013 SIGN LANG/ORAL INTERPRETER: HCPCS | Mod: U4

## 2024-07-25 PROCEDURE — 83036 HEMOGLOBIN GLYCOSYLATED A1C: CPT | Performed by: FAMILY MEDICINE

## 2024-07-25 PROCEDURE — 99213 OFFICE O/P EST LOW 20 MIN: CPT | Performed by: FAMILY MEDICINE

## 2024-07-25 PROCEDURE — 36415 COLL VENOUS BLD VENIPUNCTURE: CPT | Performed by: FAMILY MEDICINE

## 2024-07-25 RX ORDER — CHLORHEXIDINE GLUCONATE ORAL RINSE 1.2 MG/ML
15 SOLUTION DENTAL 2 TIMES DAILY
Qty: 473 ML | Refills: 0 | Status: SHIPPED | OUTPATIENT
Start: 2024-07-25 | End: 2024-09-09

## 2024-07-25 NOTE — PROGRESS NOTES
"  Assessment & Plan     Prediabetes  A1c improved to 5.7%, no changes. Ok to recheck every 6-12 months.   - Hemoglobin A1c  - Hemoglobin A1c    Visit for screening mammogram  Declined.     Congenital atresia of right external ear  Mixed hearing loss, bilateral  Microtia of right ear  Wants to reconnect with ENT to consider surgery - last time was scheduled for surgery but then got pregnant.   - Adult ENT  Referral    Mouth ulcers  Refilled.   - chlorhexidine (PERIDEX) 0.12 % solution  Dispense: 473 mL; Refill: 0            BMI  Estimated body mass index is 26.45 kg/m  as calculated from the following:    Height as of this encounter: 1.486 m (4' 10.5\").    Weight as of this encounter: 58.4 kg (128 lb 12 oz).   Weight management plan: Discussed healthy diet and exercise guidelines      Return in about 3 months (around 10/25/2024) for Medication Check.    The longitudinal plan of care for the diagnosis(es)/condition(s) as documented were addressed during this visit. Due to the added complexity in care, I will continue to support Oli in the subsequent management and with ongoing continuity of care.      Subjective   Oli is a 39 year old, presenting for the following health issues:  Follow Up (Prediabetes /)    History of Present Illness       Reason for visit:  Follow up prediabetes        Lab Results   Component Value Date    A1C 6.0 01/11/2024       Routine general medical examination at a health care facility  Declined mammo and covid booster.      Bloating  Frequent stools  Hard stool  Appt with GI rescheduled to July. Will refill miralax - she takes daily or she gets abd pain.   Today:   No showed. She takes the miralax, no issues.      Urinary frequency  Will have schedulers help her with rescheduling urology.   Today:   Seen by urology July 2024. \"Her urine is negative for blood now, so will defer additional workup, though for her frequency we can offer her a trial of tolterodine 2 mg daily. If she's " "interested in trying that, can see me back in 2-3 months to check in on progress. If not interested, she can follow up PRN.\"  She says no issues today.          Congenital atresia of right external ear  Follows with ENT. Declined surgical intervention for now, was going to have surgery but then got pregnant 2 years ago     Prediabetes  Recheck A1c in 3 months per patient preference. Previously 6%.   Today:   Stable/improved to 5.7%.          Mouth ulcers  Tooth decay  Has been brushing consistently and still having issues. Recommend having a yearly cleaning and trial chlorhexidine for 2 weeks. No signs of infection or abscess.   Today:   Wants refill.       Episodic tension-type headache, not intractable  Something to smell to make headaches go away? Unsure but will be only OTC. Will find out for patient.   Today:   Doing fine, declined any intervention                      Objective    /71   Pulse 101   Temp 98  F (36.7  C) (Oral)   Resp 16   Ht 1.486 m (4' 10.5\")   Wt 58.4 kg (128 lb 12 oz)   LMP 07/22/2024 (Approximate)   SpO2 99%   BMI 26.45 kg/m    Body mass index is 26.45 kg/m .  Physical Exam   GENERAL: alert and no distress  HEENT: deformity of right ear  NECK: no adenopathy, no asymmetry, masses, or scars  RESP: lungs clear to auscultation - no rales, rhonchi or wheezes  CV: regular rate and rhythm, normal S1 S2, no S3 or S4, no murmur, click or rub, no peripheral edema  ABDOMEN: soft, nontender, no hepatosplenomegaly, no masses and bowel sounds normal  MS: no gross musculoskeletal defects noted, no edema    Results for orders placed or performed in visit on 07/25/24   Hemoglobin A1c     Status: Abnormal   Result Value Ref Range    Hemoglobin A1C 5.7 (H) 0.0 - 5.6 %     Results for orders placed or performed in visit on 07/25/24 (from the past 24 hour(s))   Hemoglobin A1c   Result Value Ref Range    Hemoglobin A1C 5.7 (H) 0.0 - 5.6 %           Signed Electronically by: Prashanth Johnson MD    "

## 2024-07-27 DIAGNOSIS — R12 HEARTBURN: ICD-10-CM

## 2024-07-28 RX ORDER — OMEPRAZOLE 40 MG/1
CAPSULE, DELAYED RELEASE ORAL
Qty: 90 CAPSULE | Refills: 3 | Status: SHIPPED | OUTPATIENT
Start: 2024-07-28

## 2024-08-21 ENCOUNTER — TELEPHONE (OUTPATIENT)
Dept: OTOLARYNGOLOGY | Facility: CLINIC | Age: 39
End: 2024-08-21
Payer: COMMERCIAL

## 2024-08-21 ENCOUNTER — APPOINTMENT (OUTPATIENT)
Dept: INTERPRETER SERVICES | Facility: CLINIC | Age: 39
End: 2024-08-21
Payer: COMMERCIAL

## 2024-08-21 NOTE — TELEPHONE ENCOUNTER
Patient confirmed scheduled appointment:  Date: 12/20  Time: 10  Provider: Kuldeep  Location: [facility]   Testing/imaging: [facility] prior  Additional notes:

## 2024-09-04 RX ORDER — PRENATAL VIT/IRON FUM/FOLIC AC 27MG-0.8MG
1 TABLET ORAL DAILY
Qty: 90 TABLET | Refills: 4 | Status: SHIPPED | OUTPATIENT
Start: 2024-09-04

## 2024-09-07 DIAGNOSIS — K12.1 MOUTH ULCERS: ICD-10-CM

## 2024-09-09 RX ORDER — CHLORHEXIDINE GLUCONATE ORAL RINSE 1.2 MG/ML
SOLUTION DENTAL
Qty: 473 ML | Refills: 0 | Status: SHIPPED | OUTPATIENT
Start: 2024-09-09

## 2024-11-13 ENCOUNTER — OFFICE VISIT (OUTPATIENT)
Dept: FAMILY MEDICINE | Facility: CLINIC | Age: 39
End: 2024-11-13
Payer: COMMERCIAL

## 2024-11-13 VITALS
OXYGEN SATURATION: 97 % | DIASTOLIC BLOOD PRESSURE: 76 MMHG | BODY MASS INDEX: 26.21 KG/M2 | WEIGHT: 130 LBS | SYSTOLIC BLOOD PRESSURE: 114 MMHG | HEART RATE: 104 BPM | RESPIRATION RATE: 16 BRPM | TEMPERATURE: 98.3 F | HEIGHT: 59 IN

## 2024-11-13 DIAGNOSIS — H10.13 ALLERGIC CONJUNCTIVITIS, BILATERAL: ICD-10-CM

## 2024-11-13 DIAGNOSIS — Z23 IMMUNIZATION DUE: ICD-10-CM

## 2024-11-13 DIAGNOSIS — G44.209 TENSION HEADACHE: Primary | ICD-10-CM

## 2024-11-13 DIAGNOSIS — Z30.09 BIRTH CONTROL COUNSELING: ICD-10-CM

## 2024-11-13 PROCEDURE — 90656 IIV3 VACC NO PRSV 0.5 ML IM: CPT | Performed by: FAMILY MEDICINE

## 2024-11-13 PROCEDURE — T1013 SIGN LANG/ORAL INTERPRETER: HCPCS | Mod: U4

## 2024-11-13 PROCEDURE — 90471 IMMUNIZATION ADMIN: CPT | Performed by: FAMILY MEDICINE

## 2024-11-13 PROCEDURE — 99214 OFFICE O/P EST MOD 30 MIN: CPT | Mod: 25 | Performed by: FAMILY MEDICINE

## 2024-11-13 RX ORDER — OLOPATADINE HYDROCHLORIDE 1 MG/ML
1 SOLUTION/ DROPS OPHTHALMIC 2 TIMES DAILY
Qty: 5 ML | Refills: 4 | Status: SHIPPED | OUTPATIENT
Start: 2024-11-13

## 2024-11-13 RX ORDER — ACETAMINOPHEN AND CODEINE PHOSPHATE 120; 12 MG/5ML; MG/5ML
0.35 SOLUTION ORAL DAILY
Qty: 84 TABLET | Refills: 2 | Status: SHIPPED | OUTPATIENT
Start: 2024-11-13

## 2024-11-13 RX ORDER — IBUPROFEN 600 MG/1
600 TABLET, FILM COATED ORAL EVERY 6 HOURS PRN
Qty: 80 TABLET | Refills: 3 | Status: SHIPPED | OUTPATIENT
Start: 2024-11-13

## 2024-11-13 NOTE — PROGRESS NOTES
"  Assessment & Plan     Tension headache  No new concerns, just wanted a refill. Headaches once or twice per month.   - ibuprofen (ADVIL/MOTRIN) 600 MG tablet  Dispense: 80 tablet; Refill: 3    Allergic conjunctivitis, bilateral  Using prn, no new concerns.   - olopatadine (PATANOL) 0.1 % ophthalmic solution  Dispense: 5 mL; Refill: 4    Immunization due  - INFLUENZA VACCINE,SPLIT VIRUS,TRIVALENT,PF(FLUZONE)    Birth control counseling  - norethindrone (MICRONOR) 0.35 MG tablet  Dispense: 84 tablet; Refill: 2        Return in about 3 months (around 2/13/2025) for ear.      Subjective   Say is a 39 year old, presenting for the following health issues:  Recheck Medication (refills)    History of Present Illness       Reason for visit:  Recheck med        Prediabetes  A1c improved to 5.7%, no changes. Ok to recheck every 6-12 months.   Declined recheck today, which is reasonable.      Visit for screening mammogram  Declined.      Congenital atresia of right external ear  Mixed hearing loss, bilateral  Microtia of right ear  Wants to reconnect with ENT to consider surgery - last time was scheduled for surgery but then got pregnant.   - Adult ENT  Referral  Today:   Has appt on 12/20          Mouth ulcers  Refilled.   - chlorhexidine (PERIDEX) 0.12 % solution  Dispense: 473 mL; Refill: 0            Objective    /76   Pulse 104   Temp 98.3  F (36.8  C) (Oral)   Resp 16   Ht 1.505 m (4' 11.25\")   Wt 59 kg (130 lb)   SpO2 97%   BMI 26.03 kg/m    Body mass index is 26.03 kg/m .  Physical Exam   GENERAL: alert and no distress  NECK: no adenopathy, no asymmetry, masses, or scars  RESP: lungs clear to auscultation - no rales, rhonchi or wheezes  CV: regular rate and rhythm, normal S1 S2, no S3 or S4, no murmur, click or rub, no peripheral edema  ABDOMEN: soft, nontender, no hepatosplenomegaly, no masses and bowel sounds normal  MS: no gross musculoskeletal defects noted, no edema. Deformity of right " ear    No results found for any visits on 11/13/24.  No results found for this or any previous visit (from the past 24 hours).        Signed Electronically by: Prashanth Johnson MD

## 2024-12-17 NOTE — PROGRESS NOTES
"  Neurotology Clinic      Name: Oli Mercedes  MRN: [id number]  Age: 39 year old  : [birth year]24      Chief Complaint:   Follow up     This visit was conducted with a Zainab .      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 She was previously seen on 10/24/2023, where she was interested in surgery to open the meatus to avoid recurrent infection and to permit us to examine the canal and TM for signs of cholesteatoma. However, she later decided she only wanted to have surgery if it would ensure that her hearing in the right ear would become the same as hearing in the left ear. We would not expect that to be the case as opening the canal will not address the congenital malformation or fixation of the ossicles and we would need to assess the ossicular chain in surgery to see if there is a recommended middle ear reconstructive intervention vs. recommendation for a bone anchored solution.  She decided that she no longer wished to have surgery as a result of this discussion as she does not want to have a larger ear opening as it will permit water to enter the canal more easily from her perspective.     Today, she has reports that she has not had any drainage on the right side.  However she has maintained itchiness in the right ear which she cleans with a variety of implements on a routine basis.     Review of Systems:   Pertinent items are noted in HPI or as in patient entered ROS below, remainder of complete ROS is negative.       2022     7:59 AM    ENT ROS   Neurology Headache   Ears, Nose, Throat Hearing loss         Physical Exam:   BP (!) 123/92 (BP Location: Right arm, Patient Position: Sitting, Cuff Size: Adult Regular)   Pulse 102   Ht 1.499 m (4' 11\")   BMI 26.26 kg/m       Constitutional:  The patient was accompanied by a Zainab  and her son, " well-groomed, and in no acute distress.     Skin: Normal:  warm and pink without rash   Neurologic: Alert and oriented x 3.  CN's III-XII within normal limits.  Voice normal.    Psychiatric: The patient's affect was calm, cooperative, and appropriate.     Communication:  Normal; communicates verbally, normal voice quality.   Respiratory: Breathing comfortably without stridor or exertion of accessory muscles.    Head/Face:  No lesions or scars. No sinus tenderness.     Eyes: Pupils were equal and reactive.  Extraocular movement intact.     Ears: Pinnae and tragus non-tender.         Otologic microscope exam:  Right ear: Right Microtia, small superior remnant, tag at the level of the lobule, and posterior to that is a small opening. This just admits a pediatric speculum. Through this, I was able to remove cerumen from the posterior ear canal wall The skin of the canal is hairbearing, dry without inflammation or fluid. I can see down into the medial aspect of the canal and there is no cholesteatoma or concerning findings.  The canal is wider than the skin opening.  There was no inflammation or drainage today.  Left ear: Grade I microtia. There is a through and through hole in the auricle posterior to the triangular fossa. Ear canal is patent. The TM is intact, but looks like it has either been repaired or remodeled from infections posteriorly.    Audiogram:  Independently reviewed.  AUDIOGRAM 12/20/2024:  Results: Fair reliability. Pt had young son with her during the testing who was noisy and distracting. Mild to moderately severe SNHL left ear with air bone gap noted at 250 Hz (masking dilemma noted). Profound mixed hearing loss right ear. Normal tymp and absent ipsi reflex left ear. Could not seal/test for right immittance. Hearing is stable re: audio 9/28/21. Could not test word recognition due to lack on in person .    Right: Speech reception threshold is 100 dB with CNT% word recognition. Tympanogram  -- type   Left: Speech reception threshold is 35 dB with CNT% word recognition. 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 Her audiogram was independently reviewed and discussed with the patient, which had demonstrated stable hearing with known sensorineural hearing loss on the left side and mixed hearing loss on the right side. I was able to clean the cerumen out of the ears without complication. There were no signs of infection or drainage. She has been encouraged to continue with her current cleaning regimen. It has been recommended to have annual in-clinic cleanings.     She has been encouraged to reach out if symptoms worsen or if any new concerns arise before her next visit. She is in agreement with this plan, and will return in 1-year for annual cleaning.     Follow-up: 1-year for annual cleaning      Scribe Disclosure:   I, Mahnaz Kim, am serving as a scribe; to document services personally performed by Karla Light MD -based on data collection and the provider's statements to me.     Provider Disclosure:  I agree with above History, Review of Systems, Physical exam and Plan.  I have reviewed the content of the documentation and have edited it as needed. I have personally performed the services documented here and the documentation accurately represents those services and the decisions I have made.      Electronically signed by:    Karla Light MD  Otology & Neurotology  [facility]

## 2024-12-18 ENCOUNTER — PATIENT OUTREACH (OUTPATIENT)
Dept: CARE COORDINATION | Facility: CLINIC | Age: 39
End: 2024-12-18
Payer: COMMERCIAL

## 2024-12-20 ENCOUNTER — OFFICE VISIT (OUTPATIENT)
Dept: OTOLARYNGOLOGY | Facility: CLINIC | Age: 39
End: 2024-12-20
Attending: FAMILY MEDICINE
Payer: COMMERCIAL

## 2024-12-20 VITALS
SYSTOLIC BLOOD PRESSURE: 123 MMHG | BODY MASS INDEX: 26.26 KG/M2 | HEIGHT: 59 IN | DIASTOLIC BLOOD PRESSURE: 92 MMHG | HEART RATE: 102 BPM

## 2024-12-20 DIAGNOSIS — Q16.1 CONGENITAL ATRESIA OF RIGHT EXTERNAL EAR: ICD-10-CM

## 2024-12-20 DIAGNOSIS — H90.6 MIXED HEARING LOSS, BILATERAL: ICD-10-CM

## 2024-12-20 DIAGNOSIS — Q17.2 MICROTIA OF RIGHT EAR: ICD-10-CM

## 2024-12-20 PROCEDURE — 92504 EAR MICROSCOPY EXAMINATION: CPT | Performed by: OTOLARYNGOLOGY

## 2024-12-20 PROCEDURE — T1013 SIGN LANG/ORAL INTERPRETER: HCPCS | Mod: U4

## 2024-12-20 PROCEDURE — 99213 OFFICE O/P EST LOW 20 MIN: CPT | Mod: 25 | Performed by: OTOLARYNGOLOGY

## 2024-12-20 NOTE — LETTER
2024       RE: Oli Mercedes  [address] [place] [address]     Dear Colleague,    Thank you for referring your patient, Say Daren, to the [facility] at [facility]. Please see a copy of my visit note below.      Neurotology Clinic      Name: Oli Mercedes  MRN: [id number]  Age: 39 year old  : 2024      Chief Complaint:   Follow up     This visit was conducted with a Zainab .      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 She was previously seen on 10/24/2023, where she was interested in surgery to open the meatus to avoid recurrent infection and to permit us to examine the canal and TM for signs of cholesteatoma. However, she later decided she only wanted to have surgery if it would ensure that her hearing in the right ear would become the same as hearing in the left ear. We would not expect that to be the case as opening the canal will not address the congenital malformation or fixation of the ossicles and we would need to assess the ossicular chain in surgery to see if there is a recommended middle ear reconstructive intervention vs. recommendation for a bone anchored solution.  She decided that she no longer wished to have surgery as a result of this discussion as she does not want to have a larger ear opening as it will permit water to enter the canal more easily from her perspective.     Today, she has reports that she has not had any drainage on the right side.  However she has maintained itchiness in the right ear which she cleans with a variety of implements on a routine basis.     Review of Systems:   Pertinent items are noted in HPI or as in patient entered ROS below, remainder of complete ROS is negative.       2022     7:59 AM    ENT ROS  "  Neurology Headache   Ears, Nose, Throat Hearing loss         Physical Exam:   BP (!) 123/92 (BP Location: Right arm, Patient Position: Sitting, Cuff Size: Adult Regular)   Pulse 102   Ht 1.499 m (4' 11\")   BMI 26.26 kg/m       Constitutional:  The patient was accompanied by a Zainab  and her son, well-groomed, and in no acute distress.     Skin: Normal:  warm and pink without rash   Neurologic: Alert and oriented x 3.  CN's III-XII within normal limits.  Voice normal.    Psychiatric: The patient's affect was calm, cooperative, and appropriate.     Communication:  Normal; communicates verbally, normal voice quality.   Respiratory: Breathing comfortably without stridor or exertion of accessory muscles.    Head/Face:  No lesions or scars. No sinus tenderness.     Eyes: Pupils were equal and reactive.  Extraocular movement intact.     Ears: Pinnae and tragus non-tender.         Otologic microscope exam:  Right ear: Right Microtia, small superior remnant, tag at the level of the lobule, and posterior to that is a small opening. This just admits a pediatric speculum. Through this, I was able to remove cerumen from the posterior ear canal wall The skin of the canal is hairbearing, dry without inflammation or fluid. I can see down into the medial aspect of the canal and there is no cholesteatoma or concerning findings.  The canal is wider than the skin opening.  There was no inflammation or drainage today.  Left ear: Grade I microtia. There is a through and through hole in the auricle posterior to the triangular fossa. Ear canal is patent. The TM is intact, but looks like it has either been repaired or remodeled from infections posteriorly.    Audiogram:  Independently reviewed.  AUDIOGRAM 12/20/2024:  Results: Fair reliability. Pt had young son with her during the testing who was noisy and distracting. Mild to moderately severe SNHL left ear with air bone gap noted at 250 Hz (masking dilemma noted). Profound " mixed hearing loss right ear. Normal tymp and absent ipsi reflex left ear. Could not seal/test for right immittance. Hearing is stable re: audio 9/28/21. Could not test word recognition due to lack on in person .    Right: Speech reception threshold is 100 dB with CNT% word recognition. Tympanogram -- type   Left: Speech reception threshold is 35 dB with CNT% word recognition. 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 Her audiogram was independently reviewed and discussed with the patient, which had demonstrated stable hearing with known sensorineural hearing loss on the left side and mixed hearing loss on the right side. I was able to clean the cerumen out of the ears without complication. There were no signs of infection or drainage. She has been encouraged to continue with her current cleaning regimen. It has been recommended to have annual in-clinic cleanings.     She has been encouraged to reach out if symptoms worsen or if any new concerns arise before her next visit. She is in agreement with this plan, and will return in 1-year for annual cleaning.     Follow-up: 1-year for annual cleaning      Scribe Disclosure:   I, Mahnaz Kim, am serving as a scribe; to document services personally performed by Karla Light MD -based on data collection and the provider's statements to me.     Provider Disclosure:  I agree with above History, Review of Systems, Physical exam and Plan.  I have reviewed the content of the documentation and have edited it as needed. I have personally performed the services documented here and the documentation accurately represents those services and the decisions I have made.      Electronically  signed by:    Karla Light MD  Otology & Neurotology  [facility]        Again, thank you for allowing me to participate in the care of your patient.      Sincerely,    Karla Light MD

## 2025-02-18 ENCOUNTER — OFFICE VISIT (OUTPATIENT)
Dept: FAMILY MEDICINE | Facility: CLINIC | Age: 40
End: 2025-02-18
Payer: COMMERCIAL

## 2025-02-18 VITALS
RESPIRATION RATE: 15 BRPM | HEART RATE: 84 BPM | WEIGHT: 133.04 LBS | HEIGHT: 59 IN | TEMPERATURE: 97.7 F | SYSTOLIC BLOOD PRESSURE: 113 MMHG | DIASTOLIC BLOOD PRESSURE: 81 MMHG | BODY MASS INDEX: 26.82 KG/M2 | OXYGEN SATURATION: 99 %

## 2025-02-18 DIAGNOSIS — Q16.1 CONGENITAL ATRESIA OF RIGHT EXTERNAL EAR: Primary | ICD-10-CM

## 2025-02-18 DIAGNOSIS — K59.01 SLOW TRANSIT CONSTIPATION: ICD-10-CM

## 2025-02-18 DIAGNOSIS — Z12.31 VISIT FOR SCREENING MAMMOGRAM: ICD-10-CM

## 2025-02-18 DIAGNOSIS — Q17.2 MICROTIA OF RIGHT EAR: ICD-10-CM

## 2025-02-18 DIAGNOSIS — H90.6 MIXED HEARING LOSS, BILATERAL: ICD-10-CM

## 2025-02-18 DIAGNOSIS — Z30.09 BIRTH CONTROL COUNSELING: ICD-10-CM

## 2025-02-18 PROBLEM — Z23 ENCOUNTER FOR IMMUNIZATION: Status: RESOLVED | Noted: 2023-11-04 | Resolved: 2025-02-18

## 2025-02-18 PROCEDURE — T1013 SIGN LANG/ORAL INTERPRETER: HCPCS

## 2025-02-18 PROCEDURE — 99214 OFFICE O/P EST MOD 30 MIN: CPT | Performed by: FAMILY MEDICINE

## 2025-02-18 PROCEDURE — G2211 COMPLEX E/M VISIT ADD ON: HCPCS | Performed by: FAMILY MEDICINE

## 2025-02-18 RX ORDER — POLYETHYLENE GLYCOL 3350 17 G/17G
1 POWDER, FOR SOLUTION ORAL DAILY
Qty: 578 G | Refills: 3 | Status: SHIPPED | OUTPATIENT
Start: 2025-02-18

## 2025-02-18 RX ORDER — ACETAMINOPHEN AND CODEINE PHOSPHATE 120; 12 MG/5ML; MG/5ML
0.35 SOLUTION ORAL DAILY
Qty: 84 TABLET | Refills: 3 | Status: SHIPPED | OUTPATIENT
Start: 2025-02-18

## 2025-02-18 NOTE — Clinical Note
FYI Patient now says she would consider surgery, but I am going to have my Zainab care coordinator talk to her first and see if she is understanding what we have been trying to communicate before I send her back to you again!

## 2025-02-18 NOTE — PROGRESS NOTES
"  Assessment & Plan     Congenital atresia of right external ear  Mixed hearing loss, bilateral  Microtia of right ear  Patient says she has pain sometimes from the external part of her ear. I am not sure what is able to be done or if there are any indications that would allow this to be covered, but will refer to plastics for evaluation and discussion.   - Adult Plastic Surgery  Referral    Of note, she declined surgery but now is reconsidering. Will FYI her ENT team, but I will also see if care coordination can assist us with discussion with patient. Maybe there is a language or cultural barrier I am missing?     Visit for screening mammogram  Deferred.     Slow transit constipation  - GAVILAX 17 GM/SCOOP powder  Dispense: 578 g; Refill: 3    Birth control counseling  - norethindrone (MICRONOR) 0.35 MG tablet  Dispense: 84 tablet; Refill: 3            BMI  Estimated body mass index is 26.87 kg/m  as calculated from the following:    Height as of this encounter: 1.499 m (4' 11\").    Weight as of this encounter: 60.3 kg (133 lb 0.6 oz).   Weight management plan: Discussed healthy diet and exercise guidelines      Return in about 4 months (around 6/18/2025) for Routine preventive.      Subjective   Say is a 40 year old, presenting for the following health issues:  Follow Up (R EAR PAIN ) and Headache    History of Present Illness       Headaches:   Since the patient's last clinic visit, headaches are: no change  The patient is getting headaches:  3 per month since starting medication(s) for HA  She is not able to do normal daily activities when she has a migraine.  The patient is taking the following rescue/relief medications:  Ibuprofen (Advil, Motrin) and Tylenol   Patient states \"I get some relief\" from the rescue/relief medications.   The patient is taking the following medications to prevent migraines:  No medications to prevent migraines  In the past 4 weeks, the patient has gone to an Urgent Care or " "Emergency Room 0 times times due to headaches.    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 We would not expect that to be the case as opening the canal will not address the congenital malformation or fixation of the ossicles and we would need to assess the ossicular chain in surgery to see if there is a recommended middle ear reconstructive intervention vs. recommendation for a bone anchored solution.  She decided that she no longer wished to have surgery as a result of this discussion as she does not want to have a larger ear opening as it will permit water to enter the canal more easily from her perspective               Objective    /81 (BP Location: Right arm, Patient Position: Sitting, Cuff Size: Adult Regular)   Pulse 84   Temp 97.7  F (36.5  C) (Oral)   Resp 15   Ht 1.499 m (4' 11\")   Wt 60.3 kg (133 lb 0.6 oz)   LMP 02/16/2025   SpO2 99%   Breastfeeding Yes   BMI 26.87 kg/m    Body mass index is 26.87 kg/m .  Physical Exam   GENERAL: alert and no distress  HEENT: right ear deformity  NECK: no adenopathy, no asymmetry, masses, or scars  RESP: lungs clear to auscultation - no rales, rhonchi or wheezes  CV: regular rate and rhythm, normal S1 S2, no S3 or S4, no murmur, click or rub, no peripheral edema  ABDOMEN: soft, nontender, no hepatosplenomegaly, no masses and bowel sounds normal  MS: no gross musculoskeletal defects noted, no edema    No results found for any visits on 02/18/25.  No results found for this or any previous visit (from the past 24 hours).        Signed Electronically by: Prashanth Johnson MD    "

## 2025-06-18 ENCOUNTER — ANCILLARY PROCEDURE (OUTPATIENT)
Dept: MAMMOGRAPHY | Facility: CLINIC | Age: 40
End: 2025-06-18
Attending: FAMILY MEDICINE
Payer: COMMERCIAL

## 2025-06-18 ENCOUNTER — OFFICE VISIT (OUTPATIENT)
Dept: FAMILY MEDICINE | Facility: CLINIC | Age: 40
End: 2025-06-18
Payer: COMMERCIAL

## 2025-06-18 VITALS
WEIGHT: 133.9 LBS | DIASTOLIC BLOOD PRESSURE: 85 MMHG | OXYGEN SATURATION: 96 % | HEART RATE: 95 BPM | BODY MASS INDEX: 27 KG/M2 | HEIGHT: 59 IN | TEMPERATURE: 98.1 F | RESPIRATION RATE: 16 BRPM | SYSTOLIC BLOOD PRESSURE: 119 MMHG

## 2025-06-18 DIAGNOSIS — Z12.31 VISIT FOR SCREENING MAMMOGRAM: ICD-10-CM

## 2025-06-18 DIAGNOSIS — Z00.00 ROUTINE GENERAL MEDICAL EXAMINATION AT A HEALTH CARE FACILITY: Primary | ICD-10-CM

## 2025-06-18 DIAGNOSIS — Z13.29 SCREENING FOR THYROID DISORDER: ICD-10-CM

## 2025-06-18 DIAGNOSIS — R35.0 URINARY FREQUENCY: ICD-10-CM

## 2025-06-18 LAB
ALBUMIN SERPL BCG-MCNC: 4.7 G/DL (ref 3.5–5.2)
ALBUMIN UR-MCNC: NEGATIVE MG/DL
ALP SERPL-CCNC: 63 U/L (ref 40–150)
ALT SERPL W P-5'-P-CCNC: 28 U/L (ref 0–50)
ANION GAP SERPL CALCULATED.3IONS-SCNC: 14 MMOL/L (ref 7–15)
APPEARANCE UR: CLEAR
AST SERPL W P-5'-P-CCNC: 28 U/L (ref 0–45)
BACTERIA #/AREA URNS HPF: ABNORMAL /HPF
BILIRUB SERPL-MCNC: 0.4 MG/DL
BILIRUB UR QL STRIP: NEGATIVE
BUN SERPL-MCNC: 8.6 MG/DL (ref 6–20)
CALCIUM SERPL-MCNC: 9.7 MG/DL (ref 8.8–10.4)
CHLORIDE SERPL-SCNC: 103 MMOL/L (ref 98–107)
COLOR UR AUTO: YELLOW
CREAT SERPL-MCNC: 0.63 MG/DL (ref 0.51–0.95)
EGFRCR SERPLBLD CKD-EPI 2021: >90 ML/MIN/1.73M2
ERYTHROCYTE [DISTWIDTH] IN BLOOD BY AUTOMATED COUNT: 12.3 % (ref 10–15)
GLUCOSE SERPL-MCNC: 105 MG/DL (ref 70–99)
GLUCOSE UR STRIP-MCNC: NEGATIVE MG/DL
HCO3 SERPL-SCNC: 22 MMOL/L (ref 22–29)
HCT VFR BLD AUTO: 42.5 % (ref 35–47)
HGB BLD-MCNC: 14.8 G/DL (ref 11.7–15.7)
HGB UR QL STRIP: NEGATIVE
KETONES UR STRIP-MCNC: NEGATIVE MG/DL
LEUKOCYTE ESTERASE UR QL STRIP: ABNORMAL
MCH RBC QN AUTO: 30.3 PG (ref 26.5–33)
MCHC RBC AUTO-ENTMCNC: 34.8 G/DL (ref 31.5–36.5)
MCV RBC AUTO: 87 FL (ref 78–100)
NITRATE UR QL: NEGATIVE
PH UR STRIP: 7 [PH] (ref 5–7)
PLATELET # BLD AUTO: 305 10E3/UL (ref 150–450)
POTASSIUM SERPL-SCNC: 4 MMOL/L (ref 3.4–5.3)
PROT SERPL-MCNC: 7.8 G/DL (ref 6.4–8.3)
RBC # BLD AUTO: 4.89 10E6/UL (ref 3.8–5.2)
RBC #/AREA URNS AUTO: ABNORMAL /HPF
SODIUM SERPL-SCNC: 139 MMOL/L (ref 135–145)
SP GR UR STRIP: 1.01 (ref 1–1.03)
SQUAMOUS #/AREA URNS AUTO: ABNORMAL /LPF
TSH SERPL DL<=0.005 MIU/L-ACNC: 1.97 UIU/ML (ref 0.3–4.2)
UROBILINOGEN UR STRIP-ACNC: 0.2 E.U./DL
WBC # BLD AUTO: 9.6 10E3/UL (ref 4–11)
WBC #/AREA URNS AUTO: ABNORMAL /HPF

## 2025-06-18 PROCEDURE — G2211 COMPLEX E/M VISIT ADD ON: HCPCS | Performed by: FAMILY MEDICINE

## 2025-06-18 PROCEDURE — 87086 URINE CULTURE/COLONY COUNT: CPT | Performed by: FAMILY MEDICINE

## 2025-06-18 PROCEDURE — 81001 URINALYSIS AUTO W/SCOPE: CPT | Performed by: FAMILY MEDICINE

## 2025-06-18 PROCEDURE — 99396 PREV VISIT EST AGE 40-64: CPT | Performed by: FAMILY MEDICINE

## 2025-06-18 PROCEDURE — 36415 COLL VENOUS BLD VENIPUNCTURE: CPT | Performed by: FAMILY MEDICINE

## 2025-06-18 PROCEDURE — 85027 COMPLETE CBC AUTOMATED: CPT | Performed by: FAMILY MEDICINE

## 2025-06-18 PROCEDURE — 99213 OFFICE O/P EST LOW 20 MIN: CPT | Mod: 25 | Performed by: FAMILY MEDICINE

## 2025-06-18 PROCEDURE — 3079F DIAST BP 80-89 MM HG: CPT | Performed by: FAMILY MEDICINE

## 2025-06-18 PROCEDURE — 3074F SYST BP LT 130 MM HG: CPT | Performed by: FAMILY MEDICINE

## 2025-06-18 PROCEDURE — 80053 COMPREHEN METABOLIC PANEL: CPT | Performed by: FAMILY MEDICINE

## 2025-06-18 PROCEDURE — 84443 ASSAY THYROID STIM HORMONE: CPT | Performed by: FAMILY MEDICINE

## 2025-06-18 PROCEDURE — 77067 SCR MAMMO BI INCL CAD: CPT | Mod: TC | Performed by: RADIOLOGY

## 2025-06-18 SDOH — HEALTH STABILITY: PHYSICAL HEALTH: ON AVERAGE, HOW MANY DAYS PER WEEK DO YOU ENGAGE IN MODERATE TO STRENUOUS EXERCISE (LIKE A BRISK WALK)?: 3 DAYS

## 2025-06-18 SDOH — HEALTH STABILITY: PHYSICAL HEALTH: ON AVERAGE, HOW MANY MINUTES DO YOU ENGAGE IN EXERCISE AT THIS LEVEL?: 30 MIN

## 2025-06-18 ASSESSMENT — SOCIAL DETERMINANTS OF HEALTH (SDOH): HOW OFTEN DO YOU GET TOGETHER WITH FRIENDS OR RELATIVES?: NEVER

## 2025-06-18 NOTE — PATIENT INSTRUCTIONS
Patient Education   You have been provided the Preventive Care Advice document.    Additional copies can be found here: [email or web address]  Preventive Care Advice   This is general advice given by our system to help you stay healthy. However, your care team may have specific advice just for you. Please talk to your care team about your preventive care needs.  Nutrition  Eat 5 or more servings of fruits and vegetables each day.  Try wheat bread, brown rice and whole grain pasta (instead of white bread, rice, and pasta).  Get enough calcium and vitamin D. Check the label on foods and aim for 100% of the RDA (recommended daily allowance).  Lifestyle  Exercise at least 150 minutes each week  (30 minutes a day, 5 days a week).  Do muscle strengthening activities 2 days a week. These help control your weight and prevent disease.  No smoking.  Wear sunscreen to prevent skin cancer.  Have a dental exam and cleaning every 6 months.  Yearly exams  See your health care team every year to talk about:  Any changes in your health.  Any medicines your care team has prescribed.  Preventive care, family planning, and ways to prevent chronic diseases.  Shots (vaccines)   HPV shots (up to age 26), if you've never had them before.  Hepatitis B shots (up to age 59), if you've never had them before.  COVID-19 shot: Get this shot when it's due.  Flu shot: Get a flu shot every year.  Tetanus shot: Get a tetanus shot every 10 years.  Pneumococcal, hepatitis A, and RSV shots: Ask your care team if you need these based on your risk.  Shingles shot (for age 50 and up)  General health tests  Diabetes screening:  Starting at age 35, Get screened for diabetes at least every 3 years.  If you are younger than age 35, ask your care team if you should be screened for diabetes.  Cholesterol test: At age 39, start having a cholesterol test every 5 years, or more often if advised.  Bone density scan (DEXA): At age 50, ask your care team if you  should have this scan for osteoporosis (brittle bones).  Hepatitis C: Get tested at least once in your life.  STIs (sexually transmitted infections)  Before age 24: Ask your care team if you should be screened for STIs.  After age 24: Get screened for STIs if you're at risk. You are at risk for STIs (including HIV) if:  You are sexually active with more than one person.  You don't use condoms every time.  You or a partner was diagnosed with a sexually transmitted infection.  If you are at risk for HIV, ask about PrEP medicine to prevent HIV.  Get tested for HIV at least once in your life, whether you are at risk for HIV or not.  Cancer screening tests  Cervical cancer screening: If you have a cervix, begin getting regular cervical cancer screening tests starting at age 21.  Breast cancer scan (mammogram): If you've ever had breasts, begin having regular mammograms starting at age 40. This is a scan to check for breast cancer.  Colon cancer screening: It is important to start screening for colon cancer at age 45.  Have a colonoscopy test every 10 years (or more often if you're at risk) Or, ask your provider about stool tests like a FIT test every year or Cologuard test every 3 years.  To learn more about your testing options, visit:   .  For help making a decision, visit:   https://bit.ly/at86448.  Prostate cancer screening test: If you have a prostate, ask your care team if a prostate cancer screening test (PSA) at age 55 is right for you.  Lung cancer screening: If you are a current or former smoker ages 50 to 80, ask your care team if ongoing lung cancer screenings are right for you.  For informational purposes only. Not to replace the advice of your health care provider. Copyright   2023 [employer]. All rights reserved. Clinically reviewed by the [facility] Transitions Program. [employer] [id number] - REV 01/24.  Relationships for Good Health  Relationships are important for our health and  happiness. Social isolation, loneliness and lack of support are bad for your health. Studies show that loneliness can harm health and limit your life span as much as high blood pressure and smoking.   Take some time to reflect on your relationships. Then answer these questions:  Are there people in your life that cause you stress or drain your energy? What can you do to set limits?  ________________________________________________________________________________________________________________________________________________________________________________________________________________________________________________________________________________________________________________________________________________  Who do you enjoy spending time with? Who can you go to for support?  ________________________________________________________________________________________________________________________________________________________________________________________________________________________________________________________________________________________________________________________________________________  What can you do to improve your relationships with others?  __________________________________________________________________________________________________________________________________________________________________________________________________________________  ______________________________________________________________________________________________________________________________  What do you like most about your relationships with others?  ________________________________________________________________________________________________________________________________________________________________________________________________________________________________________________________________________________________________________________________________________________  My goal:  ______________________________________________________________________  I will: ______________________________________________________________________________________________________________________________________________________________________________________________    For informational purposes only. Not to replace the advice of your health care provider. Copyright   2018 [facility]. All rights reserved. Clinically reviewed by Bariatric Health  Team. Rosa [id number] - Rev 06/24.

## 2025-06-18 NOTE — PROGRESS NOTES
Preventive Care Visit  [facility] NATALIASAGE Johnson MD, Family Medicine  Jun 18, 2025      Assessment & Plan     Routine general medical examination at a health care facility  Visit for screening mammogram - MA SCREENING DIGITAL BILAT  Screening for thyroid disorder - TSH with free T4 reflex    Urinary frequency  Looks like this has been an issue for several years, did see urology in the past. I recall she was on an anticholinergic but she does not. Will rule out uti, diabetes. Consider referral back to Urology.   - UA Macroscopic with reflex to Microscopic and Culture - Lab Collect  - Comprehensive metabolic panel (BMP + Alb, Alk Phos, ALT, AST, Total. Bili, TP)      Reviewed preventive health counseling, as reflected in patient instructions    Counseling  Appropriate preventive services were addressed with this patient via screening, questionnaire, or discussion as appropriate for fall prevention, nutrition, physical activity, Tobacco-use cessation, social engagement, weight loss and cognition.  Checklist reviewing preventive services available has been given to the patient.  Reviewed patient's diet, addressing concerns and/or questions.   She is at risk for lack of exercise and has been provided with information to increase physical activity for the benefit of her well-being.   Patient is at risk for social isolation and has been provided with information about the benefit of social connection.   The patient was instructed to see the dentist every 6 months.         Follow-up  Return in about 3 months (around 9/18/2025) for urinary frequency.    Subjective   Say is a 40 year old, presenting for the following:  Physical        6/18/2025     9:59 AM   Additional Questions   Roomed by ahmet BARNARD     Congenital atresia of right external ear  Mixed hearing loss, bilateral  Microtia of right ear  Patient says she has pain sometimes from the external part of her ear. I am not sure what is  able to be done or if there are any indications that would allow this to be covered, but will refer to plastics for evaluation and discussion.   Today:   Has follow up with plastics.      Visit for screening mammogram Deferred.   Slow transit constipation - GAVILAX 17 GM/SCOOP powder  Dispense: 578 g; Refill: 3  Birth control counseling - norethindrone (MICRONOR) 0.35 MG tablet  Dispense: 84 tablet; Refill: 3      Advance Care Planning    Discussed advance care planning with patient; however, patient declined at this time.        6/18/2025   General Health   How would you rate your overall physical health? (!) FAIR   Feel stress (tense, anxious, or unable to sleep) Not at all         6/18/2025   Nutrition   Three or more servings of calcium each day? Yes   Diet: Regular (no restrictions)   How many servings of fruit and vegetables per day? (!) 2-3   How many sweetened beverages each day? 0-1         6/18/2025   Exercise   Days per week of moderate/strenous exercise 3 days   Average minutes spent exercising at this level 30 min         6/18/2025   Social Factors   Frequency of gathering with friends or relatives Never   Worry food won't last until get money to buy more No   Food not last or not have enough money for food? No   Do you have housing? (Housing is defined as stable permanent housing and does not include staying outside in a car, in a tent, in an abandoned building, in an overnight shelter, or couch-surfing.) Yes   Are you worried about losing your housing? No   Lack of transportation? No   Unable to get utilities (heat,electricity)? No   (!) SOCIAL CONNECTIONS CONCERN      6/18/2025   Dental   Dentist two times every year? (!) NO           Today's PHQ-2 Score:       2/18/2025     9:52 AM   PHQ-2 ( 1999 [employer])   Q1: Little interest or pleasure in doing things 0   Q2: Feeling down, depressed or hopeless 0   PHQ-2 Score 0         6/18/2025   Substance Use   Alcohol more than 3/day or more than 7/wk No   Do  "you use any other substances recreationally? No     Social History     Tobacco Use    Smoking status: Never     Passive exposure: Never    Smokeless tobacco: Former   Vaping Use    Vaping status: Never Used   Substance Use Topics    Alcohol use: Never    Drug use: Never          Mammogram Screening - Mammogram every 1-2 years updated in Health Maintenance based on mutual decision making        6/18/2025   STI Screening   New sexual partner(s) since last STI/HIV test? (!) DECLINE     History of abnormal Pap smear: No - age 21 - 65 - Patient with other risk factor requiring more frequent screening - see link Cervical Cytology Screening Guidelines        Latest Ref Rng & Units 6/23/2021     9:54 AM   PAP / HPV   PAP Negative for squamous intraepithelial lesion or malignancy. Negative for squamous intraepithelial lesion or malignancy  Electronically signed by Ariadna Tony CT (ASCP) on 6/30/2021 at  1:40 PM      HPV 16 DNA NEG Negative    HPV 18 DNA NEG Negative    Other HR HPV NEG Negative      ASCVD Risk   The 10-year ASCVD risk score (Chas RENEE, et al., 2019) is: 1%    Values used to calculate the score:      Age: 40 years      Sex: Female      Is Non- : No      Diabetic: No      Tobacco smoker: No      Systolic Blood Pressure: 119 mmHg      Is BP treated: No      HDL Cholesterol: 41 mg/dL      Total Cholesterol: 206 mg/dL        6/18/2025   Contraception/Family Planning   Questions about contraception or family planning No   What are your periods like? Regular        Reviewed and updated as needed this visit by Provider   Tobacco  Allergies  Meds  Problems  Med Hx  Surg Hx  Fam Hx                     Objective    Exam  /85   Pulse 95   Temp 98.1  F (36.7  C) (Oral)   Resp 16   Ht 1.5 m (4' 11.06\")   Wt 60.7 kg (133 lb 14.4 oz)   LMP 05/29/2025 (Approximate)   SpO2 96%   Breastfeeding Yes   BMI 26.99 kg/m     Estimated body mass index is 26.99 kg/m  as " "calculated from the following:    Height as of this encounter: 1.5 m (4' 11.06\").    Weight as of this encounter: 60.7 kg (133 lb 14.4 oz).    Physical Exam  GENERAL: alert and no distress  NECK: no adenopathy, no asymmetry, masses, or scars  RESP: lungs clear to auscultation - no rales, rhonchi or wheezes  CV: regular rate and rhythm, normal S1 S2, no S3 or S4, no murmur, click or rub, no peripheral edema  ABDOMEN: soft, nontender, no hepatosplenomegaly, no masses and bowel sounds normal  MS: no gross musculoskeletal defects noted, no edema        Signed Electronically by: Prashanth Johnson MD    "

## 2025-06-19 LAB — BACTERIA UR CULT: NORMAL

## 2025-06-19 NOTE — TELEPHONE ENCOUNTER
FUTURE VISIT INFORMATION      FUTURE VISIT INFORMATION:  Date: 9/17/25  Time: 8:00  Location: [facility]  REFERRAL INFORMATION:  Referring provider:  Prashanth Johnson MD   Referring providers clinic:  [facility]   Reason for visit/diagnosis    Q16.1 (ICD-10-CM) - Congenital atresia of right external ear   Q17.2 (ICD-10-CM) - Microtia of right ear       RECORDS REQUESTED FROM:       Clinic name Comments Records Status Imaging Status    OV 2/18/25-Dr. Johnson

## 2025-08-27 DIAGNOSIS — Z91.09 ENVIRONMENTAL ALLERGIES: ICD-10-CM

## 2025-08-28 RX ORDER — CETIRIZINE HYDROCHLORIDE 10 MG/1
10 TABLET ORAL DAILY
Qty: 90 TABLET | Refills: 2 | Status: SHIPPED | OUTPATIENT
Start: 2025-08-28

## 2025-09-17 ENCOUNTER — PRE VISIT (OUTPATIENT)
Dept: PLASTIC SURGERY | Facility: CLINIC | Age: 40
End: 2025-09-17